# Patient Record
Sex: FEMALE | Race: WHITE | Employment: OTHER | URBAN - METROPOLITAN AREA
[De-identification: names, ages, dates, MRNs, and addresses within clinical notes are randomized per-mention and may not be internally consistent; named-entity substitution may affect disease eponyms.]

---

## 2019-09-17 ENCOUNTER — HOSPITAL ENCOUNTER (OUTPATIENT)
Dept: AUDIOLOGY | Age: 59
Discharge: HOME | End: 2019-09-17
Payer: MEDICAID

## 2019-09-17 PROCEDURE — 92567 TYMPANOMETRY: CPT | Performed by: AUDIOLOGIST

## 2019-09-17 PROCEDURE — 92557 COMPREHENSIVE HEARING TEST: CPT | Performed by: AUDIOLOGIST

## 2019-11-12 ENCOUNTER — FOLLOWUP TELEPHONE ENCOUNTER (OUTPATIENT)
Dept: AUDIOLOGY | Age: 59
End: 2019-11-12

## 2019-12-05 ENCOUNTER — HOSPITAL ENCOUNTER (OUTPATIENT)
Dept: AUDIOLOGY | Age: 59
Discharge: HOME | End: 2019-12-05
Payer: MEDICAID

## 2019-12-05 PROCEDURE — 9990000010 HC NO CHARGE VISIT: Performed by: AUDIOLOGIST

## 2020-01-10 ENCOUNTER — HOSPITAL ENCOUNTER (OUTPATIENT)
Dept: AUDIOLOGY | Age: 60
Discharge: HOME | End: 2020-01-10
Payer: MEDICAID

## 2020-01-10 PROCEDURE — 9990000010 HC NO CHARGE VISIT: Performed by: AUDIOLOGIST

## 2020-01-10 NOTE — PROGRESS NOTES
Hearing aid follow up    [facility] with custom mold. States wire hurts top of ear and mold hurts inside. Made left ear impression to order [employer] ITE/half shell. [facility] to [facility] code [facility] to ITE hearing aid. They stated need to send cover sheet and letter stating requested changes along with the previous approval to usual fax number for adjustment of approved Vcode. Returning [facility]BT for credit. Mold is non refundable.      Aisha Ogden M.A., [address] [facility] [id number]  Electronically signed by Joy Farrar on 1/10/2020 at 12:59 PM

## 2020-02-06 ENCOUNTER — HOSPITAL ENCOUNTER (OUTPATIENT)
Dept: AUDIOLOGY | Age: 60
Discharge: HOME | End: 2020-02-06
Payer: MEDICAID

## 2020-02-06 PROCEDURE — 9990000010 HC NO CHARGE VISIT: Performed by: AUDIOLOGIST

## 2020-03-06 ENCOUNTER — HOSPITAL ENCOUNTER (OUTPATIENT)
Dept: AUDIOLOGY | Age: 60
Discharge: HOME | End: 2020-03-06

## 2020-03-17 ENCOUNTER — HOSPITAL ENCOUNTER (OUTPATIENT)
Dept: AUDIOLOGY | Age: 60
Discharge: HOME | End: 2020-03-17
Payer: MEDICAID

## 2020-03-17 PROCEDURE — V5256 HEARING AID, DIGIT, MON, ITE: HCPCS | Performed by: AUDIOLOGIST

## 2020-03-17 PROCEDURE — V5241 DISPENSING FEE, MONAURAL: HCPCS | Performed by: AUDIOLOGIST

## 2020-03-17 NOTE — PROGRESS NOTES
Patient called due to missed appointment and hearing aid billing. She is doing well with the hearing aids. Billing to be done over the phone as patient does not want to come in due to COVID-19. She will call in as needed.     Electronically signed by Deirdre Monsivais on 3/17/2020 at 4:03 PM

## 2020-05-18 ENCOUNTER — FOLLOWUP TELEPHONE ENCOUNTER (OUTPATIENT)
Dept: AUDIOLOGY | Age: 60
End: 2020-05-18

## 2020-05-19 ENCOUNTER — FOLLOWUP TELEPHONE ENCOUNTER (OUTPATIENT)
Dept: AUDIOLOGY | Age: 60
End: 2020-05-19

## 2024-03-01 ENCOUNTER — HOSPITAL ENCOUNTER (OUTPATIENT)
Age: 64
Discharge: HOME | End: 2024-03-03

## 2024-03-01 PROCEDURE — 88305 TISSUE EXAM BY PATHOLOGIST: CPT

## 2024-03-06 LAB — SURGICAL PATHOLOGY REPORT: NORMAL

## 2024-07-01 ENCOUNTER — HOSPITAL ENCOUNTER (INPATIENT)
Age: 64
LOS: 14 days | Discharge: OTHER INSTITUTION | DRG: 233 | End: 2024-07-19
Attending: STUDENT IN AN ORGANIZED HEALTH CARE EDUCATION/TRAINING PROGRAM | Admitting: STUDENT IN AN ORGANIZED HEALTH CARE EDUCATION/TRAINING PROGRAM
Payer: COMMERCIAL

## 2024-07-01 ENCOUNTER — APPOINTMENT (OUTPATIENT)
Dept: GENERAL RADIOLOGY | Age: 64
DRG: 233 | End: 2024-07-01
Payer: COMMERCIAL

## 2024-07-01 DIAGNOSIS — I25.119 CORONARY ARTERY DISEASE INVOLVING NATIVE CORONARY ARTERY OF NATIVE HEART WITH ANGINA PECTORIS (HCC): ICD-10-CM

## 2024-07-01 DIAGNOSIS — R07.2 PRECORDIAL PAIN: Primary | ICD-10-CM

## 2024-07-01 DIAGNOSIS — R07.9 CHEST PAIN: ICD-10-CM

## 2024-07-01 DIAGNOSIS — R07.9 CHEST PAIN, UNSPECIFIED TYPE: ICD-10-CM

## 2024-07-01 DIAGNOSIS — G89.18 ACUTE POST-OPERATIVE PAIN: ICD-10-CM

## 2024-07-01 DIAGNOSIS — I25.10 CAD IN NATIVE ARTERY: ICD-10-CM

## 2024-07-01 LAB
ANION GAP SERPL CALCULATED.3IONS-SCNC: 14 MMOL/L (ref 7–16)
BASOPHILS # BLD: 0 K/UL (ref 0–0.2)
BASOPHILS NFR BLD: 0 % (ref 0–2)
BNP SERPL-MCNC: 167 PG/ML (ref 0–125)
BUN SERPL-MCNC: 17 MG/DL (ref 6–23)
CALCIUM SERPL-MCNC: 10.1 MG/DL (ref 8.6–10.2)
CHLORIDE SERPL-SCNC: 101 MMOL/L (ref 98–107)
CO2 SERPL-SCNC: 23 MMOL/L (ref 22–29)
CREAT SERPL-MCNC: 0.7 MG/DL (ref 0.5–1)
EOSINOPHIL # BLD: 0.08 K/UL (ref 0.05–0.5)
EOSINOPHILS RELATIVE PERCENT: 1 % (ref 0–6)
ERYTHROCYTE [DISTWIDTH] IN BLOOD BY AUTOMATED COUNT: 11.9 % (ref 11.5–15)
GFR, ESTIMATED: 90 ML/MIN/1.73M2
GLUCOSE SERPL-MCNC: 177 MG/DL (ref 74–99)
HCT VFR BLD AUTO: 42.6 % (ref 34–48)
HGB BLD-MCNC: 14.9 G/DL (ref 11.5–15.5)
LYMPHOCYTES NFR BLD: 1.13 K/UL (ref 1.5–4)
LYMPHOCYTES RELATIVE PERCENT: 12 % (ref 20–42)
MCH RBC QN AUTO: 41.2 PG (ref 26–35)
MCHC RBC AUTO-ENTMCNC: 35 G/DL (ref 32–34.5)
MCV RBC AUTO: 117.7 FL (ref 80–99.9)
MONOCYTES NFR BLD: 0.73 K/UL (ref 0.1–0.95)
MONOCYTES NFR BLD: 8 % (ref 2–12)
NEUTROPHILS NFR BLD: 79 % (ref 43–80)
NEUTS SEG NFR BLD: 7.36 K/UL (ref 1.8–7.3)
NUCLEATED RED BLOOD CELLS: 1 PER 100 WBC
PLATELET # BLD AUTO: 563 K/UL (ref 130–450)
PMV BLD AUTO: 9.9 FL (ref 7–12)
POTASSIUM SERPL-SCNC: 4.8 MMOL/L (ref 3.5–5)
RBC # BLD AUTO: 3.62 M/UL (ref 3.5–5.5)
RBC # BLD: ABNORMAL 10*6/UL
SODIUM SERPL-SCNC: 138 MMOL/L (ref 132–146)
TROPONIN I SERPL HS-MCNC: 15 NG/L (ref 0–9)
TROPONIN I SERPL HS-MCNC: 20 NG/L (ref 0–9)
WBC OTHER # BLD: 9.3 K/UL (ref 4.5–11.5)

## 2024-07-01 PROCEDURE — 71045 X-RAY EXAM CHEST 1 VIEW: CPT

## 2024-07-01 PROCEDURE — 80048 BASIC METABOLIC PNL TOTAL CA: CPT

## 2024-07-01 PROCEDURE — 93005 ELECTROCARDIOGRAM TRACING: CPT

## 2024-07-01 PROCEDURE — 85025 COMPLETE CBC W/AUTO DIFF WBC: CPT

## 2024-07-01 PROCEDURE — 83880 ASSAY OF NATRIURETIC PEPTIDE: CPT

## 2024-07-01 PROCEDURE — 84484 ASSAY OF TROPONIN QUANT: CPT

## 2024-07-01 PROCEDURE — 99285 EMERGENCY DEPT VISIT HI MDM: CPT

## 2024-07-01 ASSESSMENT — PAIN - FUNCTIONAL ASSESSMENT: PAIN_FUNCTIONAL_ASSESSMENT: NONE - DENIES PAIN

## 2024-07-02 ENCOUNTER — APPOINTMENT (OUTPATIENT)
Age: 64
DRG: 233 | End: 2024-07-02
Attending: INTERNAL MEDICINE
Payer: COMMERCIAL

## 2024-07-02 ENCOUNTER — APPOINTMENT (OUTPATIENT)
Dept: NUCLEAR MEDICINE | Age: 64
DRG: 233 | End: 2024-07-02
Attending: INTERNAL MEDICINE
Payer: COMMERCIAL

## 2024-07-02 PROBLEM — E66.8 MODERATE OBESITY: Status: ACTIVE | Noted: 2024-07-02

## 2024-07-02 PROBLEM — E66.9 MODERATE OBESITY: Status: ACTIVE | Noted: 2024-07-02

## 2024-07-02 PROBLEM — R07.9 CHEST PAIN: Status: ACTIVE | Noted: 2024-07-02

## 2024-07-02 PROBLEM — R79.89 ELEVATED TROPONIN: Status: ACTIVE | Noted: 2024-07-02

## 2024-07-02 PROBLEM — E78.00 PURE HYPERCHOLESTEROLEMIA: Status: ACTIVE | Noted: 2024-07-02

## 2024-07-02 LAB
ECHO BSA: 2.1 M2
EKG ATRIAL RATE: 66 BPM
EKG ATRIAL RATE: 78 BPM
EKG P AXIS: 37 DEGREES
EKG P AXIS: 52 DEGREES
EKG P-R INTERVAL: 146 MS
EKG P-R INTERVAL: 146 MS
EKG Q-T INTERVAL: 376 MS
EKG Q-T INTERVAL: 422 MS
EKG QRS DURATION: 78 MS
EKG QRS DURATION: 82 MS
EKG QTC CALCULATION (BAZETT): 428 MS
EKG QTC CALCULATION (BAZETT): 442 MS
EKG R AXIS: 32 DEGREES
EKG R AXIS: 5 DEGREES
EKG T AXIS: 2 DEGREES
EKG T AXIS: 32 DEGREES
EKG VENTRICULAR RATE: 66 BPM
EKG VENTRICULAR RATE: 78 BPM
GLUCOSE BLD-MCNC: 237 MG/DL (ref 74–99)
STRESS BASELINE DIAS BP: 80 MMHG
STRESS BASELINE HR: 69 BPM
STRESS BASELINE SYS BP: 160 MMHG
STRESS ESTIMATED WORKLOAD: 1 METS
STRESS PEAK DIAS BP: 80 MMHG
STRESS PEAK SYS BP: 160 MMHG
STRESS PERCENT HR ACHIEVED: 59 %
STRESS POST PEAK HR: 92 BPM
STRESS RATE PRESSURE PRODUCT: NORMAL BPM*MMHG
STRESS TARGET HR: 155 BPM
TROPONIN I SERPL HS-MCNC: 35 NG/L (ref 0–9)

## 2024-07-02 PROCEDURE — 93016 CV STRESS TEST SUPVJ ONLY: CPT | Performed by: INTERNAL MEDICINE

## 2024-07-02 PROCEDURE — 6370000000 HC RX 637 (ALT 250 FOR IP): Performed by: INTERNAL MEDICINE

## 2024-07-02 PROCEDURE — G0378 HOSPITAL OBSERVATION PER HR: HCPCS

## 2024-07-02 PROCEDURE — 3430000000 HC RX DIAGNOSTIC RADIOPHARMACEUTICAL: Performed by: RADIOLOGY

## 2024-07-02 PROCEDURE — 6360000002 HC RX W HCPCS: Performed by: INTERNAL MEDICINE

## 2024-07-02 PROCEDURE — 99223 1ST HOSP IP/OBS HIGH 75: CPT | Performed by: INTERNAL MEDICINE

## 2024-07-02 PROCEDURE — 78452 HT MUSCLE IMAGE SPECT MULT: CPT | Performed by: INTERNAL MEDICINE

## 2024-07-02 PROCEDURE — A9500 TC99M SESTAMIBI: HCPCS | Performed by: RADIOLOGY

## 2024-07-02 PROCEDURE — 82962 GLUCOSE BLOOD TEST: CPT

## 2024-07-02 PROCEDURE — 93018 CV STRESS TEST I&R ONLY: CPT | Performed by: INTERNAL MEDICINE

## 2024-07-02 PROCEDURE — 36415 COLL VENOUS BLD VENIPUNCTURE: CPT

## 2024-07-02 PROCEDURE — 93010 ELECTROCARDIOGRAM REPORT: CPT | Performed by: INTERNAL MEDICINE

## 2024-07-02 PROCEDURE — 96376 TX/PRO/DX INJ SAME DRUG ADON: CPT

## 2024-07-02 PROCEDURE — 84484 ASSAY OF TROPONIN QUANT: CPT

## 2024-07-02 PROCEDURE — 93017 CV STRESS TEST TRACING ONLY: CPT

## 2024-07-02 PROCEDURE — 93005 ELECTROCARDIOGRAM TRACING: CPT | Performed by: INTERNAL MEDICINE

## 2024-07-02 PROCEDURE — 96375 TX/PRO/DX INJ NEW DRUG ADDON: CPT

## 2024-07-02 PROCEDURE — 78452 HT MUSCLE IMAGE SPECT MULT: CPT

## 2024-07-02 RX ORDER — PRAVASTATIN SODIUM 20 MG
40 TABLET ORAL DAILY
Status: DISCONTINUED | OUTPATIENT
Start: 2024-07-02 | End: 2024-07-09

## 2024-07-02 RX ORDER — PANTOPRAZOLE SODIUM 40 MG/1
40 TABLET, DELAYED RELEASE ORAL
Status: DISCONTINUED | OUTPATIENT
Start: 2024-07-02 | End: 2024-07-09

## 2024-07-02 RX ORDER — MORPHINE SULFATE 2 MG/ML
1 INJECTION, SOLUTION INTRAMUSCULAR; INTRAVENOUS EVERY 4 HOURS PRN
Status: DISCONTINUED | OUTPATIENT
Start: 2024-07-02 | End: 2024-07-09

## 2024-07-02 RX ORDER — SODIUM CHLORIDE 9 MG/ML
INJECTION, SOLUTION INTRAVENOUS CONTINUOUS
Status: ACTIVE | OUTPATIENT
Start: 2024-07-03 | End: 2024-07-03

## 2024-07-02 RX ORDER — TETRAKIS(2-METHOXYISOBUTYLISOCYANIDE)COPPER(I) TETRAFLUOROBORATE 1 MG/ML
12.9 INJECTION, POWDER, LYOPHILIZED, FOR SOLUTION INTRAVENOUS
Status: COMPLETED | OUTPATIENT
Start: 2024-07-02 | End: 2024-07-02

## 2024-07-02 RX ORDER — DIPHENHYDRAMINE HCL 25 MG
50 TABLET ORAL ONCE
Status: COMPLETED | OUTPATIENT
Start: 2024-07-02 | End: 2024-07-02

## 2024-07-02 RX ORDER — DIPHENHYDRAMINE HCL 25 MG
25 TABLET ORAL EVERY 6 HOURS PRN
Status: DISCONTINUED | OUTPATIENT
Start: 2024-07-03 | End: 2024-07-05

## 2024-07-02 RX ORDER — PREDNISONE 20 MG/1
40 TABLET ORAL ONCE
Status: COMPLETED | OUTPATIENT
Start: 2024-07-02 | End: 2024-07-02

## 2024-07-02 RX ORDER — TETRAKIS(2-METHOXYISOBUTYLISOCYANIDE)COPPER(I) TETRAFLUOROBORATE 1 MG/ML
30 INJECTION, POWDER, LYOPHILIZED, FOR SOLUTION INTRAVENOUS
Status: COMPLETED | OUTPATIENT
Start: 2024-07-02 | End: 2024-07-02

## 2024-07-02 RX ORDER — REGADENOSON 0.08 MG/ML
0.4 INJECTION, SOLUTION INTRAVENOUS
Status: COMPLETED | OUTPATIENT
Start: 2024-07-02 | End: 2024-07-02

## 2024-07-02 RX ORDER — AMLODIPINE BESYLATE 10 MG/1
10 TABLET ORAL DAILY
Status: DISCONTINUED | OUTPATIENT
Start: 2024-07-02 | End: 2024-07-09

## 2024-07-02 RX ORDER — HYDROXYUREA 500 MG/1
500 CAPSULE ORAL 2 TIMES DAILY
Status: DISCONTINUED | OUTPATIENT
Start: 2024-07-02 | End: 2024-07-03

## 2024-07-02 RX ADMIN — Medication 12.9 MILLICURIE: at 08:24

## 2024-07-02 RX ADMIN — METOPROLOL TARTRATE 50 MG: 50 TABLET, FILM COATED ORAL at 11:40

## 2024-07-02 RX ADMIN — HYDROXYUREA 500 MG: 500 CAPSULE ORAL at 21:00

## 2024-07-02 RX ADMIN — METFORMIN HYDROCHLORIDE 500 MG: 500 TABLET ORAL at 19:01

## 2024-07-02 RX ADMIN — MORPHINE SULFATE 1 MG: 2 INJECTION, SOLUTION INTRAMUSCULAR; INTRAVENOUS at 06:07

## 2024-07-02 RX ADMIN — PANTOPRAZOLE SODIUM 40 MG: 40 TABLET, DELAYED RELEASE ORAL at 06:07

## 2024-07-02 RX ADMIN — AMLODIPINE BESYLATE 10 MG: 10 TABLET ORAL at 11:39

## 2024-07-02 RX ADMIN — MORPHINE SULFATE 1 MG: 2 INJECTION, SOLUTION INTRAMUSCULAR; INTRAVENOUS at 21:34

## 2024-07-02 RX ADMIN — REGADENOSON 0.4 MG: 0.08 INJECTION, SOLUTION INTRAVENOUS at 09:38

## 2024-07-02 RX ADMIN — PRAVASTATIN SODIUM 40 MG: 20 TABLET ORAL at 11:43

## 2024-07-02 RX ADMIN — METOPROLOL TARTRATE 50 MG: 50 TABLET, FILM COATED ORAL at 20:26

## 2024-07-02 RX ADMIN — HYDROXYUREA 500 MG: 500 CAPSULE ORAL at 11:43

## 2024-07-02 RX ADMIN — Medication 30 MILLICURIE: at 11:06

## 2024-07-02 RX ADMIN — PREDNISONE 40 MG: 20 TABLET ORAL at 20:25

## 2024-07-02 RX ADMIN — METFORMIN HYDROCHLORIDE 500 MG: 500 TABLET ORAL at 11:39

## 2024-07-02 RX ADMIN — DIPHENHYDRAMINE HYDROCHLORIDE 50 MG: 25 TABLET ORAL at 20:25

## 2024-07-02 ASSESSMENT — PAIN SCALES - GENERAL
PAINLEVEL_OUTOF10: 6
PAINLEVEL_OUTOF10: 5

## 2024-07-02 ASSESSMENT — PAIN DESCRIPTION - LOCATION: LOCATION: CHEST

## 2024-07-02 NOTE — PLAN OF CARE
Problem: Chronic Conditions and Co-morbidities  Goal: Patient's chronic conditions and co-morbidity symptoms are monitored and maintained or improved  Outcome: Progressing     Problem: Safety - Adult  Goal: Free from fall injury  Outcome: Progressing     Problem: ABCDS Injury Assessment  Goal: Absence of physical injury  Outcome: Progressing

## 2024-07-02 NOTE — PROGRESS NOTES
4 Eyes Skin Assessment     NAME:  Sherie Guerrero  YOB: 1960  MEDICAL RECORD NUMBER:  [id number]    The patient is being assessed for  Admission    I agree that at least one RN has performed a thorough Head to Toe Skin Assessment on the patient. ALL assessment sites listed below have been assessed.      Areas assessed by both nurses:    Head, Face, Ears, Shoulders, Back, Chest, Arms, Elbows, Hands, Sacrum. Buttock, Coccyx, Ischium, and Legs. Feet and Heels        Does the Patient have a Wound? No noted wound(s)       Joe Prevention initiated by RN: Yes  Wound Care Orders initiated by RN: No    Pressure Injury (Stage 3,4, Unstageable, DTI, NWPT, and Complex wounds) if present, place Wound referral order by RN under : No    New Ostomies, if present place, Ostomy referral order under : No     Nurse 1 eSignature: Electronically signed by Tc Berg RN on 7/2/24 at 4:57 AM EDT    **SHARE this note so that the co-signing nurse can place an eSignature**    Nurse 2 eSignature: Electronically signed by Yareli Bush RN on 7/2/24 at 4:58 AM EDT

## 2024-07-02 NOTE — PROGRESS NOTES
Perfusion imaging reviewed and demonstrates evidence of a reversible inferior perfusion abnormality consistent with ischemia of the right coronary distribution with an additional component of attenuation with associated postischemic stunning suggesting an intermediate risk of ischemic events.  On the basis of perfusion findings and the patient's clinical presentation including that of her high-sensitivity troponin levels, coronary angiography will be recommended and further discussed including potential issues related to contrast allergy.    SCAI AUC(7), indication 16; score 7

## 2024-07-02 NOTE — ED PROVIDER NOTES
Department of Emergency Medicine     Written by: Soco Leblanc MD  Patient Name: Sherie Guerrero  Admit Date: 2024  7:56 PM  MRN: [id number]                   : 1960    HPI     Chief Complaint   Patient presents with    Chest Pain     Patient c/o midsternal chest pain intermittently throughout the day. Denies SOB or dizziness.       Sherie Guerrero is a 63 y.o. female that presents to the ED with midsternal chest pressure.  Ongoing for the last few days.  The patient says that she has no provocative relieving factors.  She has no shortness of breath with her symptoms.  No fevers chills or diaphoresis.  She is never seen a cardiologist, she has not had a stress test or echocardiogram.  She is compliant with her medications.  She does not use anticoagulants or antiplatelet therapies.  She does state that she injured her left hand in the past, she is having tingling in her left hand.  No recent illness diarrhea constipation    Review of systems   Pertinent positives and negatives mentioned in the HPI/MDM.      Physical   Physical Exam  Constitutional:       General: She is not in acute distress.     Appearance: Normal appearance.   Eyes:      Extraocular Movements: Extraocular movements intact.      Pupils: Pupils are equal, round, and reactive to light.   Cardiovascular:      Rate and Rhythm: Normal rate and regular rhythm.      Pulses: Normal pulses.      Heart sounds: Normal heart sounds.   Pulmonary:      Effort: Pulmonary effort is normal. No respiratory distress.   Abdominal:      Palpations: Abdomen is soft.      Tenderness: There is no abdominal tenderness.   Musculoskeletal:         General: No swelling or tenderness. Normal range of motion.   Skin:     General: Skin is warm and dry.      Coloration: Skin is not jaundiced or pale.   Neurological:      Mental Status: She is alert and oriented to person, place, and time. Mental status is at baseline.        Chart review: The patient was  allergic to aspirin and did not want to take it.  Discussed the case with internal medicine who agreed to admit this patient to their excellent service, possibly for coronary artery catheterization.    Clinical Impression  1. Precordial pain    2. Chest pain, unspecified type         Disposition  Patient's disposition: Admit to telemetry    Soco Leblanc MD  Resident PGY-2

## 2024-07-02 NOTE — CARE COORDINATION
7/2/24, Patient admitted for chest pain.  SW went to meet with patient in room-who is currently off floor for stress test.  Should stress test be negative patient could possibly discharge to home.  Patient presents from home.  PCP is Dr. Avery and Pharmacy is [facility] in [place].  SW will follow for any home going needs.      Olimpia Duncan DIONICIO  [facility] Case Management  [phone]

## 2024-07-02 NOTE — H&P
[facility]  History and Physical      CHIEF COMPLAINT: Chest pain    Reason for Admission: Chest pain, elevated troponin    History Obtained From: Patient    PCP :  Ovidio Avery MD    [address] / [facility] [address]      HISTORY OF PRESENT ILLNESS:      The patient is a 63 y.o. female presents to the emergency room because of chest pain.  Pain is located in the left anterior precordium with radiation into the jaw and left arm.  She has underlying history of hypertension, essential thrombocytosis.  Her troponin was higher than baseline.  She was then admitted.  She has been having intermittent chest pain.  She needed morphine during the night for chest pain control.  Patient was seen in the stress suite earlier today prior to stress test.    Past Medical History:        Diagnosis Date    Diabetes (HCC)     Hernia     Hypertension     Obesity     PCOS (polycystic ovarian syndrome)      Past Surgical History:        Procedure Laterality Date    CHOLECYSTECTOMY      CYST REMOVAL      ON NECK    FOOT SURGERY           Medications Prior to Admission:    Medications Prior to Admission: hydroxyurea (HYDREA) 500 MG chemo capsule, take 1 capsule by mouth twice a day  sertraline (ZOLOFT) 50 MG tablet, take 1 tablet by mouth once daily (Patient not taking: Reported on 7/2/2024)  ALPRAZolam (XANAX) 2 MG tablet, Take 1 tablet by mouth 2 times daily as needed for Anxiety for up to 30 days.  metFORMIN (GLUCOPHAGE) 500 MG tablet, take 1 tablet by mouth twice a day with meals  amLODIPine (NORVASC) 10 MG tablet, take 1 tablet by mouth once daily  pravastatin (PRAVACHOL) 40 MG tablet, Take 1 tablet by mouth daily  triamcinolone (KENALOG) 0.1 % cream, Apply topically to affected areas 2 times daily.  omeprazole (PRILOSEC) 40 MG delayed release capsule, Take 1 capsule by mouth daily  metoprolol tartrate (LOPRESSOR) 50 MG tablet, Take 1 tablet by mouth 2 times daily  EPINEPHrine (EPIPEN    Result Value Ref Range    Body Surface Area 2.1 m2    Stress Target  bpm    Baseline Systolic  mmHg    Baseline Diastolic BP 80 mmHg    Stress Systolic  mmHg    Stress Diastolic BP 80 mmHg    Baseline HR 69 BPM    Stress Peak HR 92 BPM    Stress Estimated Workload 1.0 METS    Stress Rate Pressure Product 14,720 BPM*mmHg    Stress Percent HR Achieved 59 %   EKG 12 Lead    Collection Time: 07/02/24 12:02 PM   Result Value Ref Range    Ventricular Rate 66 BPM    Atrial Rate 66 BPM    P-R Interval 146 ms    QRS Duration 78 ms    Q-T Interval 422 ms    QTc Calculation (Bazett) 442 ms    P Axis 37 degrees    R Axis 5 degrees    T Axis 2 degrees       XR CHEST PORTABLE   Final Result   No acute cardiopulmonary disease.                 ASSESSMENT :      Principal Problem:    Chest pain  Active Problems:    Primary hypertension    Type 2 diabetes mellitus without complication, without long-term current use of insulin (HCC)    Elevated troponin    Pure hypercholesterolemia    Moderate obesity  Resolved Problems:    * No resolved hospital problems. *      Plan :  *This is abnormal  Cardiology following  Possible heart cath  Electronically signed by Art Blue MD on 7/2/2024 at 4:07 PM    NOTE: This report was transcribed using voice recognition software. Every effort was made to ensure accuracy; however, inadvertent transcription errors may be present

## 2024-07-02 NOTE — CONSULTS
188 09/24/2018     Lab Results   Component Value Date    HDL 51 08/05/2019    HDL 25 (A) 04/01/2019    HDL 35 09/24/2018     No components found for: \"LDLCALC\"      Cardiac Tests:  ECG: Initial resting electrocardiogram reviewed time of evaluation demonstrates evidence of sinus rhythm with occasional supraventricular ectopy and nonspecific ST changes and with the exception of ectopy no significant change from prior tracings  Telemetry findings reviewed: sinus rhythm with occasional supraventricular ectopy, no new tachy/bradyarrhythmias overnight  Chest X-ray: A chest x-ray reviewed at time of evaluation demonstrates no evidence of cardiomegaly or infiltrate      ASSESSMENT / PLAN: On a clinical basis, the patient presents with symptoms of precordial chest discomfort of a random nature and nonexertional with progressive frequency and descriptors not in compatible with that of myocardial ischemia in the absence of significant electrocardiographic abnormalities on her initial tracing and with equivocal high-sensitivity troponin levels.  At time of evaluation this is been extensively discussed on the basis of her symptomatology and risk profile, both a diagnostic and more importantly prognostic basis, a gated vasodilator myocardial perfusion imaging study will be obtained with additional recommendations as appropriate following completion and review.  Independent of findings, appropriate lifestyle modification to achieve weight reduction will benefit diastolic cardiac performance as well as reducing risk of the development of obstructive sleep apnea with associated adverse cardiovascular effects.  Ongoing aggressive risk factor modification of blood pressure, diabetes and serum lipids will remain essential to reducing risk of future atherosclerotic development.  Additional noncardiovascular factors will be deferred to primary care.    Thank you for allowing me to participate in your patient's care. Please feel free to

## 2024-07-03 ENCOUNTER — APPOINTMENT (OUTPATIENT)
Dept: CT IMAGING | Age: 64
DRG: 233 | End: 2024-07-03
Payer: COMMERCIAL

## 2024-07-03 PROBLEM — I25.10 CAD (CORONARY ARTERY DISEASE): Status: ACTIVE | Noted: 2024-07-03

## 2024-07-03 PROBLEM — I25.10 CAD IN NATIVE ARTERY: Status: ACTIVE | Noted: 2024-07-01

## 2024-07-03 LAB
ABO + RH BLD: NORMAL
ARM BAND NUMBER: NORMAL
BLOOD BANK SAMPLE EXPIRATION: NORMAL
BLOOD GROUP ANTIBODIES SERPL: NEGATIVE
ECHO BSA: 2.1 M2
ERYTHROCYTE [DISTWIDTH] IN BLOOD BY AUTOMATED COUNT: 11.9 % (ref 11.5–15)
HCT VFR BLD AUTO: 40.8 % (ref 34–48)
HGB BLD-MCNC: 14.6 G/DL (ref 11.5–15.5)
MCH RBC QN AUTO: 42.2 PG (ref 26–35)
MCHC RBC AUTO-ENTMCNC: 35.8 G/DL (ref 32–34.5)
MCV RBC AUTO: 117.9 FL (ref 80–99.9)
PARTIAL THROMBOPLASTIN TIME: 17.7 SEC (ref 24.5–35.1)
PARTIAL THROMBOPLASTIN TIME: 74.1 SEC (ref 24.5–35.1)
PLATELET # BLD AUTO: 663 K/UL (ref 130–450)
PMV BLD AUTO: 10.3 FL (ref 7–12)
RBC # BLD AUTO: 3.46 M/UL (ref 3.5–5.5)
WBC OTHER # BLD: 14.7 K/UL (ref 4.5–11.5)

## 2024-07-03 PROCEDURE — 6370000000 HC RX 637 (ALT 250 FOR IP): Performed by: NURSE PRACTITIONER

## 2024-07-03 PROCEDURE — 2580000003 HC RX 258: Performed by: INTERNAL MEDICINE

## 2024-07-03 PROCEDURE — G0378 HOSPITAL OBSERVATION PER HR: HCPCS

## 2024-07-03 PROCEDURE — 2580000003 HC RX 258: Performed by: NURSE PRACTITIONER

## 2024-07-03 PROCEDURE — 6370000000 HC RX 637 (ALT 250 FOR IP): Performed by: INTERNAL MEDICINE

## 2024-07-03 PROCEDURE — 96366 THER/PROPH/DIAG IV INF ADDON: CPT

## 2024-07-03 PROCEDURE — 96365 THER/PROPH/DIAG IV INF INIT: CPT

## 2024-07-03 PROCEDURE — 6360000004 HC RX CONTRAST MEDICATION: Performed by: INTERNAL MEDICINE

## 2024-07-03 PROCEDURE — 6360000002 HC RX W HCPCS: Performed by: INTERNAL MEDICINE

## 2024-07-03 PROCEDURE — 93458 L HRT ARTERY/VENTRICLE ANGIO: CPT | Performed by: INTERNAL MEDICINE

## 2024-07-03 PROCEDURE — 4A023N7 MEASUREMENT OF CARDIAC SAMPLING AND PRESSURE, LEFT HEART, PERCUTANEOUS APPROACH: ICD-10-PCS | Performed by: INTERNAL MEDICINE

## 2024-07-03 PROCEDURE — 96368 THER/DIAG CONCURRENT INF: CPT

## 2024-07-03 PROCEDURE — 2500000003 HC RX 250 WO HCPCS: Performed by: INTERNAL MEDICINE

## 2024-07-03 PROCEDURE — B2151ZZ FLUOROSCOPY OF LEFT HEART USING LOW OSMOLAR CONTRAST: ICD-10-PCS | Performed by: INTERNAL MEDICINE

## 2024-07-03 PROCEDURE — 87081 CULTURE SCREEN ONLY: CPT

## 2024-07-03 PROCEDURE — C1887 CATHETER, GUIDING: HCPCS | Performed by: INTERNAL MEDICINE

## 2024-07-03 PROCEDURE — 96376 TX/PRO/DX INJ SAME DRUG ADON: CPT

## 2024-07-03 PROCEDURE — 99222 1ST HOSP IP/OBS MODERATE 55: CPT | Performed by: STUDENT IN AN ORGANIZED HEALTH CARE EDUCATION/TRAINING PROGRAM

## 2024-07-03 PROCEDURE — 85027 COMPLETE CBC AUTOMATED: CPT

## 2024-07-03 PROCEDURE — 2000000000 HC ICU R&B

## 2024-07-03 PROCEDURE — 93571 IV DOP VEL&/PRESS C FLO 1ST: CPT | Performed by: INTERNAL MEDICINE

## 2024-07-03 PROCEDURE — 2709999900 HC NON-CHARGEABLE SUPPLY: Performed by: INTERNAL MEDICINE

## 2024-07-03 PROCEDURE — B2111ZZ FLUOROSCOPY OF MULTIPLE CORONARY ARTERIES USING LOW OSMOLAR CONTRAST: ICD-10-PCS | Performed by: INTERNAL MEDICINE

## 2024-07-03 PROCEDURE — 99233 SBSQ HOSP IP/OBS HIGH 50: CPT | Performed by: INTERNAL MEDICINE

## 2024-07-03 PROCEDURE — 93799 UNLISTED CV SVC/PROCEDURE: CPT | Performed by: INTERNAL MEDICINE

## 2024-07-03 PROCEDURE — 86850 RBC ANTIBODY SCREEN: CPT

## 2024-07-03 PROCEDURE — 86901 BLOOD TYPING SEROLOGIC RH(D): CPT

## 2024-07-03 PROCEDURE — C1769 GUIDE WIRE: HCPCS | Performed by: INTERNAL MEDICINE

## 2024-07-03 PROCEDURE — 86900 BLOOD TYPING SEROLOGIC ABO: CPT

## 2024-07-03 PROCEDURE — C1894 INTRO/SHEATH, NON-LASER: HCPCS | Performed by: INTERNAL MEDICINE

## 2024-07-03 PROCEDURE — 71250 CT THORAX DX C-: CPT

## 2024-07-03 PROCEDURE — 85730 THROMBOPLASTIN TIME PARTIAL: CPT

## 2024-07-03 RX ORDER — SENNA AND DOCUSATE SODIUM 50; 8.6 MG/1; MG/1
2 TABLET, FILM COATED ORAL NIGHTLY
Status: DISCONTINUED | OUTPATIENT
Start: 2024-07-03 | End: 2024-07-09

## 2024-07-03 RX ORDER — NITROGLYCERIN 20 MG/100ML
5-200 INJECTION INTRAVENOUS CONTINUOUS
Status: DISCONTINUED | OUTPATIENT
Start: 2024-07-03 | End: 2024-07-09

## 2024-07-03 RX ORDER — ASPIRIN 81 MG/1
81 TABLET, CHEWABLE ORAL ONCE
Status: COMPLETED | OUTPATIENT
Start: 2024-07-03 | End: 2024-07-03

## 2024-07-03 RX ORDER — HEPARIN SODIUM 1000 [USP'U]/ML
2000 INJECTION, SOLUTION INTRAVENOUS; SUBCUTANEOUS PRN
Status: DISCONTINUED | OUTPATIENT
Start: 2024-07-03 | End: 2024-07-09

## 2024-07-03 RX ORDER — ALBUTEROL SULFATE 2.5 MG/3ML
2.5 SOLUTION RESPIRATORY (INHALATION)
Status: DISCONTINUED | OUTPATIENT
Start: 2024-07-03 | End: 2024-07-09

## 2024-07-03 RX ORDER — DIPHENHYDRAMINE HYDROCHLORIDE 50 MG/ML
25 INJECTION INTRAMUSCULAR; INTRAVENOUS EVERY 4 HOURS PRN
Status: DISCONTINUED | OUTPATIENT
Start: 2024-07-03 | End: 2024-07-05

## 2024-07-03 RX ORDER — ASPIRIN 81 MG/1
81 TABLET ORAL DAILY
Status: DISCONTINUED | OUTPATIENT
Start: 2024-07-04 | End: 2024-07-09

## 2024-07-03 RX ORDER — HEPARIN SODIUM 1000 [USP'U]/ML
4000 INJECTION, SOLUTION INTRAVENOUS; SUBCUTANEOUS ONCE
Status: COMPLETED | OUTPATIENT
Start: 2024-07-03 | End: 2024-07-03

## 2024-07-03 RX ORDER — MIDAZOLAM HYDROCHLORIDE 1 MG/ML
INJECTION INTRAMUSCULAR; INTRAVENOUS PRN
Status: DISCONTINUED | OUTPATIENT
Start: 2024-07-03 | End: 2024-07-03 | Stop reason: HOSPADM

## 2024-07-03 RX ORDER — FENTANYL CITRATE 50 UG/ML
INJECTION, SOLUTION INTRAMUSCULAR; INTRAVENOUS PRN
Status: DISCONTINUED | OUTPATIENT
Start: 2024-07-03 | End: 2024-07-03 | Stop reason: HOSPADM

## 2024-07-03 RX ORDER — VERAPAMIL HYDROCHLORIDE 2.5 MG/ML
INJECTION, SOLUTION INTRAVENOUS PRN
Status: DISCONTINUED | OUTPATIENT
Start: 2024-07-03 | End: 2024-07-03 | Stop reason: HOSPADM

## 2024-07-03 RX ORDER — ASPIRIN 81 MG/1
40.5 TABLET, CHEWABLE ORAL ONCE
Status: COMPLETED | OUTPATIENT
Start: 2024-07-03 | End: 2024-07-03

## 2024-07-03 RX ORDER — ASPIRIN 81 MG/1
324 TABLET, CHEWABLE ORAL ONCE
Status: COMPLETED | OUTPATIENT
Start: 2024-07-03 | End: 2024-07-03

## 2024-07-03 RX ORDER — HYDRALAZINE HYDROCHLORIDE 20 MG/ML
INJECTION INTRAMUSCULAR; INTRAVENOUS PRN
Status: DISCONTINUED | OUTPATIENT
Start: 2024-07-03 | End: 2024-07-03 | Stop reason: HOSPADM

## 2024-07-03 RX ORDER — SODIUM CHLORIDE 0.9 % (FLUSH) 0.9 %
5-40 SYRINGE (ML) INJECTION PRN
Status: DISCONTINUED | OUTPATIENT
Start: 2024-07-03 | End: 2024-07-09

## 2024-07-03 RX ORDER — HYDROXYUREA 500 MG/1
1000 CAPSULE ORAL 2 TIMES DAILY
Status: DISCONTINUED | OUTPATIENT
Start: 2024-07-03 | End: 2024-07-05

## 2024-07-03 RX ORDER — MONTELUKAST SODIUM 10 MG/1
10 TABLET ORAL
Status: DISPENSED | OUTPATIENT
Start: 2024-07-03 | End: 2024-07-04

## 2024-07-03 RX ORDER — SODIUM CHLORIDE 9 MG/ML
INJECTION, SOLUTION INTRAVENOUS CONTINUOUS
Status: ACTIVE | OUTPATIENT
Start: 2024-07-03 | End: 2024-07-03

## 2024-07-03 RX ORDER — HEPARIN SODIUM 10000 [USP'U]/ML
INJECTION, SOLUTION INTRAVENOUS; SUBCUTANEOUS PRN
Status: DISCONTINUED | OUTPATIENT
Start: 2024-07-03 | End: 2024-07-03 | Stop reason: HOSPADM

## 2024-07-03 RX ORDER — HEPARIN SODIUM 10000 [USP'U]/100ML
5-30 INJECTION, SOLUTION INTRAVENOUS CONTINUOUS
Status: DISCONTINUED | OUTPATIENT
Start: 2024-07-03 | End: 2024-07-09

## 2024-07-03 RX ORDER — HEPARIN SODIUM 1000 [USP'U]/ML
4000 INJECTION, SOLUTION INTRAVENOUS; SUBCUTANEOUS PRN
Status: DISCONTINUED | OUTPATIENT
Start: 2024-07-03 | End: 2024-07-09

## 2024-07-03 RX ORDER — ACETAMINOPHEN 325 MG/1
650 TABLET ORAL EVERY 4 HOURS PRN
Status: DISCONTINUED | OUTPATIENT
Start: 2024-07-03 | End: 2024-07-09

## 2024-07-03 RX ORDER — ASPIRIN 81 MG/1
162 TABLET, CHEWABLE ORAL ONCE
Status: COMPLETED | OUTPATIENT
Start: 2024-07-03 | End: 2024-07-03

## 2024-07-03 RX ORDER — SODIUM CHLORIDE 0.9 % (FLUSH) 0.9 %
5-40 SYRINGE (ML) INJECTION EVERY 12 HOURS SCHEDULED
Status: DISCONTINUED | OUTPATIENT
Start: 2024-07-03 | End: 2024-07-09

## 2024-07-03 RX ORDER — EPINEPHRINE 1 MG/ML(1)
0.3 AMPUL (ML) INJECTION
Status: DISCONTINUED | OUTPATIENT
Start: 2024-07-03 | End: 2024-07-05

## 2024-07-03 RX ORDER — NITROGLYCERIN 20 MG/100ML
INJECTION INTRAVENOUS CONTINUOUS PRN
Status: COMPLETED | OUTPATIENT
Start: 2024-07-03 | End: 2024-07-03

## 2024-07-03 RX ORDER — SODIUM CHLORIDE 9 MG/ML
INJECTION, SOLUTION INTRAVENOUS PRN
Status: DISCONTINUED | OUTPATIENT
Start: 2024-07-03 | End: 2024-07-09

## 2024-07-03 RX ADMIN — METOPROLOL TARTRATE 50 MG: 50 TABLET, FILM COATED ORAL at 22:05

## 2024-07-03 RX ADMIN — HEPARIN SODIUM 10 UNITS/KG/HR: 10000 INJECTION, SOLUTION INTRAVENOUS at 15:31

## 2024-07-03 RX ADMIN — ASPIRIN 81 MG 0.3 MG: 81 TABLET ORAL at 21:30

## 2024-07-03 RX ADMIN — NITROGLYCERIN 5 MCG/MIN: 20 INJECTION INTRAVENOUS at 20:29

## 2024-07-03 RX ADMIN — MORPHINE SULFATE 1 MG: 2 INJECTION, SOLUTION INTRAMUSCULAR; INTRAVENOUS at 18:29

## 2024-07-03 RX ADMIN — SODIUM CHLORIDE: 9 INJECTION, SOLUTION INTRAVENOUS at 11:18

## 2024-07-03 RX ADMIN — PRAVASTATIN SODIUM 40 MG: 20 TABLET ORAL at 10:22

## 2024-07-03 RX ADMIN — MORPHINE SULFATE 1 MG: 2 INJECTION, SOLUTION INTRAMUSCULAR; INTRAVENOUS at 10:15

## 2024-07-03 RX ADMIN — ASPIRIN 81 MG 324 MG: 81 TABLET ORAL at 23:32

## 2024-07-03 RX ADMIN — METOPROLOL TARTRATE 50 MG: 50 TABLET, FILM COATED ORAL at 10:21

## 2024-07-03 RX ADMIN — ASPIRIN 81 MG 10 MG: 81 TABLET ORAL at 22:15

## 2024-07-03 RX ADMIN — HYDROXYUREA 500 MG: 500 CAPSULE ORAL at 10:23

## 2024-07-03 RX ADMIN — HYDROCORTISONE SODIUM SUCCINATE 100 MG: 100 INJECTION, POWDER, FOR SOLUTION INTRAMUSCULAR; INTRAVENOUS at 06:45

## 2024-07-03 RX ADMIN — ASPIRIN 81 MG 3 MG: 81 TABLET ORAL at 22:00

## 2024-07-03 RX ADMIN — AMLODIPINE BESYLATE 10 MG: 10 TABLET ORAL at 10:20

## 2024-07-03 RX ADMIN — HYDROXYUREA 1000 MG: 500 CAPSULE ORAL at 22:05

## 2024-07-03 RX ADMIN — ASPIRIN 81 MG 81 MG: 81 TABLET ORAL at 23:02

## 2024-07-03 RX ADMIN — MORPHINE SULFATE 1 MG: 2 INJECTION, SOLUTION INTRAMUSCULAR; INTRAVENOUS at 14:28

## 2024-07-03 RX ADMIN — ASPIRIN 81 MG 1 MG: 81 TABLET ORAL at 21:45

## 2024-07-03 RX ADMIN — ASPIRIN 81 MG 162 MG: 81 TABLET ORAL at 23:15

## 2024-07-03 RX ADMIN — HEPARIN SODIUM 4000 UNITS: 1000 INJECTION INTRAVENOUS; SUBCUTANEOUS at 15:31

## 2024-07-03 RX ADMIN — SODIUM CHLORIDE: 9 INJECTION, SOLUTION INTRAVENOUS at 05:58

## 2024-07-03 RX ADMIN — MORPHINE SULFATE 1 MG: 2 INJECTION, SOLUTION INTRAMUSCULAR; INTRAVENOUS at 22:05

## 2024-07-03 RX ADMIN — SODIUM CHLORIDE, PRESERVATIVE FREE 10 ML: 5 INJECTION INTRAVENOUS at 10:16

## 2024-07-03 RX ADMIN — DIPHENHYDRAMINE HYDROCHLORIDE 25 MG: 25 TABLET ORAL at 06:45

## 2024-07-03 RX ADMIN — ASPIRIN 81 MG 40.5 MG: 81 TABLET ORAL at 22:47

## 2024-07-03 RX ADMIN — ASPIRIN 81 MG 0.1 MG: 81 TABLET ORAL at 21:18

## 2024-07-03 RX ADMIN — ASPIRIN 81 MG 20 MG: 81 TABLET ORAL at 22:30

## 2024-07-03 ASSESSMENT — PAIN DESCRIPTION - FREQUENCY
FREQUENCY: CONTINUOUS
FREQUENCY: CONTINUOUS
FREQUENCY: INTERMITTENT

## 2024-07-03 ASSESSMENT — PAIN SCALES - GENERAL
PAINLEVEL_OUTOF10: 6
PAINLEVEL_OUTOF10: 4
PAINLEVEL_OUTOF10: 0
PAINLEVEL_OUTOF10: 2
PAINLEVEL_OUTOF10: 5
PAINLEVEL_OUTOF10: 7

## 2024-07-03 ASSESSMENT — PAIN DESCRIPTION - ORIENTATION
ORIENTATION: MID

## 2024-07-03 ASSESSMENT — PAIN DESCRIPTION - LOCATION
LOCATION: CHEST;BACK
LOCATION: BACK;CHEST
LOCATION: CHEST
LOCATION: BACK;CHEST
LOCATION: CHEST

## 2024-07-03 ASSESSMENT — PAIN - FUNCTIONAL ASSESSMENT: PAIN_FUNCTIONAL_ASSESSMENT: ACTIVITIES ARE NOT PREVENTED

## 2024-07-03 ASSESSMENT — PAIN DESCRIPTION - DESCRIPTORS
DESCRIPTORS: HEAVINESS
DESCRIPTORS: ACHING;SORE;DISCOMFORT
DESCRIPTORS: HEAVINESS
DESCRIPTORS: ACHING;DISCOMFORT;SORE
DESCRIPTORS: ACHING;DISCOMFORT;SORE

## 2024-07-03 ASSESSMENT — PAIN DESCRIPTION - PAIN TYPE
TYPE: ACUTE PAIN
TYPE: ACUTE PAIN

## 2024-07-03 ASSESSMENT — PAIN DESCRIPTION - ONSET: ONSET: ON-GOING

## 2024-07-03 NOTE — CONSULTS
[facility]  Hematology/Oncology  Consult      Patient Name: Sherie Guerrero  YOB: 1960  PCP: Ovidio Avery MD   Referring Provider:      Reason for Consultation:   Chief Complaint   Patient presents with    Chest Pain     Patient c/o midsternal chest pain intermittently throughout the day. Denies SOB or dizziness.        History of Present Illness:  This is a 63-year-old female patient following with Dr. Silva for essential thrombocythemia JAK2 negative. She is on treatment with Hydrea 500 mg BID.  She does not take ASA 81 mg as she is allergic. Platelets on 6/6/24 were 493. WBC and Hgb WNL. MCV elevated and stable from hydrea (120). Plan was to continue current dosing of Hydrea. EGD 3/1 reportedly normal. She was to return to the clinic in 3 months. Patient presented to the ED for evaluation of chest pain. Her troponins and ProBNP were slightly elevated but a stress test was positive for inferior ischemia. CTS was consulted and she underwent an urgent LHC which demonstrated severe MV CAD. Because of her LHC and unstable angina, it was thought that she would benefit from revascularization during this hospital stay. Recommend full preoperative work-up including TTE in prep for CABG 7/9/24. She may need aspirin desensitization protocol, discussed with patient. CBC with ., platelets 563. WBC and Hgb WN Consultation for any special recommendations for CPB, post-op care with a history of essential thrombocythemia.    Review of systems: Over 10 systems were reviewed and all were negative except as mention above.      Diagnostic Data:     Past Medical History:   Diagnosis Date    Diabetes (HCC)     Hernia     Hypertension     Obesity     PCOS (polycystic ovarian syndrome)        Patient Active Problem List    Diagnosis Date Noted    CAD (coronary artery disease) 07/03/2024    Chest pain 07/02/2024    Elevated troponin 07/02/2024    Pure hypercholesterolemia 07/02/2024     07/01/2024    EOSPCT 1 07/01/2024    BASOPCT 0 07/01/2024    NEUTROABS 7.36 (H) 07/01/2024    LYMPHSABS 1.13 (L) 07/01/2024    MONOSABS 0.73 07/01/2024    EOSABS 0.08 07/01/2024    BASOSABS 0.00 07/01/2024       Lab Results   Component Value Date     07/01/2024    K 4.8 07/01/2024     07/01/2024    CO2 23 07/01/2024    BUN 17 07/01/2024    CREATININE 0.7 07/01/2024    GLUCOSE 177 (H) 07/01/2024    CALCIUM 10.1 07/01/2024    BILITOT 0.5 12/18/2023    ALKPHOS 87 12/18/2023    AST 26 12/18/2023    ALT 28 12/18/2023    LABGLOM 90 07/01/2024       No results found for: \"IRON\", \"TIBC\", \"FERRITIN\"        Radiology-    Cardiac procedure    Result Date: 7/3/2024  Severe two-vessel CAD Elevated LVEDP at 17 mmHg Ejection fraction 55 to 60%    Nuclear stress test with myocardial perfusion    Result Date: 7/2/2024    Stress Combined Conclusion: Abnormal vasodilator SPECT myocardial perfusion imaging with a reversible inferior perfusion abnormality suggestive of ischemia of the right coronary distribution in addition to that of attenuation with associated regional wall motion abnormality suggestive of postischemic stunning with normal left ventricular systolic function. Based on perfusion findings in conjunction with the patient's response to vasodilator administration, an intermediate risk of ischemic events is suggested.   Perfusion Comments: The left ventricle was normal in size.  No motion artifact is present with hepatic tracer uptake adjacent to myocardial segments noted.  Moderately diminished tracer uptake is present within the basal and mid inferior segment on the post regadenoson images with minimal residually decreased tracer uptake within the basilar inferior segment at the time of resting images.  Gated images demonstrate inferior hypokinesis with normal contraction and thickening of remaining myocardial segments.  A post stress ejection fraction of 76% is calculated.   Stress Test: A pharmacological

## 2024-07-03 NOTE — CARE COORDINATION
7/3/24, SW met with patient to discuss follow up appointment with her IOP.  Charge Nurse came and informed patient that her next IOP appointment will be 7/8/24 (Monday) with counselor and 7/9/24 appointment will be with the Doctor.  Patient says she usually attend IOP M/W/Th from 8:30 am-noon.  Above information to appear on her discharge papers-patient aware.  SW to follow.      Olimpia Duncan, DIONICIO  [facility] Case Management  [phone]

## 2024-07-03 NOTE — CARE COORDINATION
7/3/24, patient admitted for chest pain.  SW met with patient in room along with patient's son.  Discussed transition of care/discharge preference and SW role.  Patient lives with son in a 1 story home with 4 steps to enter the home.  DME owned is none.  Patient still drives and is independent with ADL's.  PCP confirmed is Dr. Harrison and Pharmacy is [facility].  No hx of HHC or PILI>  Discussed HHC should patient need surgery and reviewed patient needing to ambulate 400 feet and have someone stay with patient at home for the 1st 2 weeks.  Patient has no preference on HHC and said she would want a company that takes her insurance.  Call placed to [facility] D[facility].  SOC will be 7/9/24 Tuesday.  Will need HHC order if HHC is needed.  SW to follow.      Case Management Assessment  Initial Evaluation    Date/Time of Evaluation: 7/3/2024 1:01 PM  Assessment Completed by: MONIQUE Shahid    If patient is discharged prior to next notation, then this note serves as note for discharge by case management.    Patient Name: Sherie Guerrero                   YOB: 1960  Diagnosis: Chest pain [R07.9]                   Date / Time: 7/1/2024  7:56 PM    Patient Admission Status: Observation   Readmission Risk (Low < 19, Mod (19-27), High > 27): No data recorded  Current PCP: Ovidio Avery MD  PCP verified by CM? Yes    Chart Reviewed: Yes      History Provided by: Patient  Patient Orientation: Alert and Oriented    Patient Cognition: Alert    Hospitalization in the last 30 days (Readmission):  No    If yes, Readmission Assessment in  Navigator will be completed.    Advance Directives:      Code Status: Full Code   Patient's Primary Decision Maker is:      Primary Decision Maker: Zoey Puckett - Brother/Sister - [phone]    Discharge Planning:    Patient lives with: Children Type of Home: Apartment  Primary Care Giver: Self  Patient Support Systems include: Children    Current Financial resources:    Current community resources:    Current services prior to admission: None            Current DME:              Type of Home Care services:  None    ADLS  Prior functional level: Independent in ADLs/IADLs  Current functional level: Independent in ADLs/IADLs    PT AM-PAC:   /24  OT AM-PAC:   /24    Family can provide assistance at DC: Yes  Would you like Case Management to discuss the discharge plan with any other family members/significant others, and if so, who? Yes (Son)  Plans to Return to Present Housing: Yes  Other Identified Issues/Barriers to RETURNING to current housing: N/A  Potential Assistance needed at discharge: N/A            Potential DME:    Patient expects to discharge to: Apartment  Plan for transportation at discharge:      Financial    Payor: [facility] MEDICAID / Plan: [facility] MEDICAID / Product Type: *No Product type* /     Does insurance require precert for SNF: Yes    Potential assistance Purchasing Medications:    Meds-to-Beds request: Yes      [id number] - [facility] [address] -  [phone] - F [phone]  [address] [place] [address]  Phone: [phone] Fax: [phone]    [id number] - [place], [place] - [address] [facility] - P [phone] - F [phone]  [address] [facility] [address]  Phone: [phone] Fax: [phone]      Notes:    Factors facilitating achievement of predicted outcomes: Family support    Barriers to discharge: N/A    Additional Case Management Notes: See Above    The Plan for Transition of Care is related to the following treatment goals of Chest pain [R07.9]    IF APPLICABLE: The Patient and/or patient representative Sherie and her family were provided with a choice of provider and agrees with the discharge plan. Freedom of choice list with basic dialogue that supports the patient's individualized plan of

## 2024-07-03 NOTE — PROGRESS NOTES
Patient admitted to [facility] with the following belongings:  Glasses, Hearing Aids , and Jewelry, (left hearing aid and 2 ring. The following belongings admitted with the patient, Cell Phone, Cell Phone , Pants, Shirt, Socks, and Shoes, were sent home with the patient's family.    Attending Only

## 2024-07-03 NOTE — PROGRESS NOTES
Patient transported down to CT via wheelchair with transporter and RN assist. Patient family directed to waiting room. Patient then transported to 3815 without difficulty. Inquired if receiving RN had any questions. No questions at this time.

## 2024-07-03 NOTE — CONSULTS
CTS Consult    Patient name: Sherie Guerrero    Reason for consult: MVD, aspirin allergy, please evaluate for surgical revascularization    Referring Physician: Regulo Carter    Primary Care Physician: Ovidio Avery MD    Date of service: 7/3/2024    Chief Complaint: chest pain    HPI: 64yo F with PMH DM, HTN, essential thrombocytopenia (on hydroxyurea), h/o aspirin allergy presented to the ED 7/1/2024 with CC of chest pain. Troponins were reassuring but stress testing was positive for inferior ischemia. LHC demonstrated severe MV CAD. CTS was consulted for recommendations.  Currently, she continue to have intermittent chest pain. She states that it is in her anterior chest with radiation to her jaw. Denies worsening with exertion. Denies other complaints including SOB. She does not know the reaction to aspirin. She states she woke up in the hospital.     Allergies:   Allergies   Allergen Reactions    Sulfa Antibiotics Anaphylaxis    Asa [Aspirin]     Iodine     Mushroom Extract Complex     Pcn [Penicillins]     Shellfish-Derived Products        Home medications:    Current Facility-Administered Medications   Medication Dose Route Frequency Provider Last Rate Last Admin    sodium chloride flush 0.9 % injection 5-40 mL  5-40 mL IntraVENous 2 times per day Tami Eldridge MD   10 mL at 07/03/24 1016    sodium chloride flush 0.9 % injection 5-40 mL  5-40 mL IntraVENous PRN Tami Eldridge MD        0.9 % sodium chloride infusion   IntraVENous PRN Tami Eldridge MD        acetaminophen (TYLENOL) tablet 650 mg  650 mg Oral Q4H PRN Tami Eldridge MD        0.9 % sodium chloride infusion   IntraVENous Continuous Tami Eldridge MD        heparin (porcine) injection 4,000 Units  4,000 Units IntraVENous Once Regulo Carter MD        heparin (porcine) injection 4,000 Units  4,000 Units IntraVENous PRN Regulo Carter MD        heparin (porcine) injection 2,000 Units  2,000 Units IntraVENous PRN Regulo Carter MD      LAD stenosis. iFR of the LAD was 0.76 x 3 and 0.72 twice after intracoronary glycerin.      Left Circumflex   It is a large vessel giving rise to a large bifurcating obtuse marginal branch. There was 30% discrete ostial circumflex stenosis.      Right Coronary Artery   It is a large dominant vessel giving rise to a conus branch, an RV marginal branch, a small PDA and a PLV branch. The RCA was moderately diseased mid segment with 90% discrete mid to distal RCA stenosis. There was 30% diffuse mid PLV branch stenosis      Intervention     No interventions have been documented.       Assessment: 62yo F with PMH DM, HTN, essential thrombocythemia (on hydroxyurea), h/o aspirin allergy presented to the ED 7/1/2024 with CC of chest pain. Troponins were reassuring but stress testing was positive for inferior ischemia. LHC demonstrated severe MV CAD. CTS was consulted for recommendations.    Plan: Given her findings on LHC and unstable angina, would benefit from revascularization during this hospital stay  Recommend full preoperative work-up including TTE  May need aspirin desensitization protocol, discussed with patient  Will ask hematology to see given her history of essential thrombocytopenia, any special recommendations for CPB, post-op care    Electronically signed by Martine Gleason DO on 7/3/2024 at 11:02 AM

## 2024-07-03 NOTE — PROGRESS NOTES
Subjective:    Chief complaint:    Seen in Cath Lab after procedure  She has no new complaints  No problems overnight.  Denies chest pain, angina, and dyspnea.  Denies abdominal pain.  Tolerating diet.  No nausea or vomiting.    Objective:    BP (!) 163/78   Pulse 75   Temp 97.1 °F (36.2 °C) (Temporal)   Resp 16   Ht 1.651 m (5' 5\")   Wt 96.2 kg (212 lb)   SpO2 99%   BMI 35.28 kg/m²   General : Awake ,alert,no distress.  Heart:  RRR, no murmurs, gallops, or rubs.  Lungs:  CTA bilaterally, no wheeze, rales or rhonchi  Abd: bowel sounds present, nontender, nondistended, no masses  Extrem:  No clubbing, cyanosis, or edema    CBC:   Lab Results   Component Value Date/Time    WBC 9.3 07/01/2024 09:13 PM    RBC 3.62 07/01/2024 09:13 PM    HGB 14.9 07/01/2024 09:13 PM    HCT 42.6 07/01/2024 09:13 PM    .7 07/01/2024 09:13 PM    MCH 41.2 07/01/2024 09:13 PM    MCHC 35.0 07/01/2024 09:13 PM    RDW 11.9 07/01/2024 09:13 PM     07/01/2024 09:13 PM    MPV 9.9 07/01/2024 09:13 PM     BMP:    Lab Results   Component Value Date/Time     07/01/2024 09:13 PM    K 4.8 07/01/2024 09:13 PM     07/01/2024 09:13 PM    CO2 23 07/01/2024 09:13 PM    BUN 17 07/01/2024 09:13 PM    CREATININE 0.7 07/01/2024 09:13 PM    CALCIUM 10.1 07/01/2024 09:13 PM    LABGLOM 90 07/01/2024 09:13 PM    LABGLOM >60 12/18/2023 02:00 PM    GLUCOSE 177 07/01/2024 09:13 PM    GLUCOSE 104 11/23/2010 09:48 AM     PT/INR:  No results found for: \"PROTIME\", \"INR\"  Troponin:  No results found for: \"TROPONINI\"    No results for input(s): \"LABURIN\" in the last 72 hours.  No results for input(s): \"BC\" in the last 72 hours.  No results for input(s): \"BLOODCULT2\" in the last 72 hours.      Current Facility-Administered Medications:     sodium chloride flush 0.9 % injection 5-40 mL, 5-40 mL, IntraVENous, 2 times per day, Tami Eldridge MD, 10 mL at 07/03/24 1016    sodium chloride flush 0.9 % injection 5-40 mL, 5-40 mL, IntraVENous, PRN,

## 2024-07-03 NOTE — ACP (ADVANCE CARE PLANNING)
7/3/24, Advance Care Planning   Healthcare Decision Maker:    Primary Decision Maker: Zoey Puckett - Brother/Sister - [phone]    Click here to complete Healthcare Decision Makers including selection of the Healthcare Decision Maker Relationship (ie \"Primary\").

## 2024-07-03 NOTE — PROGRESS NOTES
Attempted to send for patient. Pt recently back to floor from [facility] cath. Rn unable to send patient until vas band is off which will approx be 2 hours. Will attempt again once patient is able and schedule permits. Did make floor aware it may not be today.

## 2024-07-03 NOTE — PROGRESS NOTES
4 Eyes Skin Assessment     NAME:  Sherie Guerrero  YOB: 1960  MEDICAL RECORD NUMBER:  [id number]    The patient is being assessed for  Transfer to New Unit    I agree that at least one RN has performed a thorough Head to Toe Skin Assessment on the patient. ALL assessment sites listed below have been assessed.      Areas assessed by both nurses:    Head, Face, Ears, Shoulders, Back, Chest, Arms, Elbows, Hands, Sacrum. Buttock, Coccyx, Ischium, Legs. Feet and Heels, and Under Medical Devices         Does the Patient have a Wound? No noted wound(s)       Joe Prevention initiated by RN: Yes  Wound Care Orders initiated by RN: No    Pressure Injury (Stage 3,4, Unstageable, DTI, NWPT, and Complex wounds) if present, place Wound referral order by RN under : No    New Ostomies, if present place, Ostomy referral order under : No     Nurse 1 eSignature: Electronically signed by Cindy Mallory RN on 7/3/24 at 7:07 PM EDT    **SHARE this note so that the co-signing nurse can place an eSignature**    Nurse 2 eSignature: Electronically signed by Linnea Rodriguez RN on 7/3/24 at 9:17 PM EDT

## 2024-07-03 NOTE — PROGRESS NOTES
Nurse to nurse report called to MARINO Leiva- [phone]   Alert and oriented to person, place and time/Patient baseline mental status

## 2024-07-03 NOTE — PROGRESS NOTES
INPATIENT CARDIOLOGY FOLLOW-UP    Name: Sherie Guerrero    Age: 63 y.o.    Date of Admission: 7/1/2024  7:56 PM    Date of Service: 7/3/2024    Chief Complaint: Follow-up for precordial chest pain, abnormal troponin, hypertension, thrombocytosis, chronic pain syndrome, moderate obesity    Interim History: The patient relates additional episodes of precordial chest discomfort radiating to her neck and jaw as well as her left upper extremity associated with diaphoresis and durations of approximately 5 minutes in the face of her presentation and perfusion imaging suggesting ischemia of the right coronary distribution with associated postischemic stunning.  She has received initial components of her contrast preparation anticipation of coronary angiography later today.      Review of Systems:   The remainder of a complete multisystem review including consitutional, central nervous, respiratory, circulatory, gastrointestinal, genitourinary, endocrinologic, hematologic, musculoskeletal and psychiatric are negative.    Problem List:  Patient Active Problem List   Diagnosis    Gastroesophageal reflux disease    Primary hypertension    Type 2 diabetes mellitus without complication, without long-term current use of insulin (HCC)    Thrombocytosis    Chest pain    Elevated troponin    Pure hypercholesterolemia    Moderate obesity       Allergies:  Allergies   Allergen Reactions    Sulfa Antibiotics Anaphylaxis    Asa [Aspirin]     Iodine     Mushroom Extract Complex     Pcn [Penicillins]     Shellfish-Derived Products        Current Medications:  Current Facility-Administered Medications   Medication Dose Route Frequency Provider Last Rate Last Admin    metoprolol tartrate (LOPRESSOR) tablet 50 mg  50 mg Oral BID Art Blue MD   50 mg at 07/02/24 2026    pravastatin (PRAVACHOL) tablet 40 mg  40 mg Oral Daily Art Blue MD   40 mg at 07/02/24 1143    amLODIPine (NORVASC) tablet 10 mg  10 mg Oral

## 2024-07-04 ENCOUNTER — APPOINTMENT (OUTPATIENT)
Dept: ULTRASOUND IMAGING | Age: 64
DRG: 233 | End: 2024-07-04
Payer: COMMERCIAL

## 2024-07-04 LAB
ANION GAP SERPL CALCULATED.3IONS-SCNC: 14 MMOL/L (ref 7–16)
BILIRUB UR QL STRIP: NEGATIVE
BUN SERPL-MCNC: 25 MG/DL (ref 6–23)
CALCIUM SERPL-MCNC: 8.9 MG/DL (ref 8.6–10.2)
CHLORIDE SERPL-SCNC: 99 MMOL/L (ref 98–107)
CLARITY UR: CLEAR
CO2 SERPL-SCNC: 22 MMOL/L (ref 22–29)
COLOR UR: YELLOW
CREAT SERPL-MCNC: 0.8 MG/DL (ref 0.5–1)
ERYTHROCYTE [DISTWIDTH] IN BLOOD BY AUTOMATED COUNT: 12.1 % (ref 11.5–15)
GFR, ESTIMATED: 78 ML/MIN/1.73M2
GLUCOSE SERPL-MCNC: 235 MG/DL (ref 74–99)
GLUCOSE UR STRIP-MCNC: 250 MG/DL
HBA1C MFR BLD: 6.4 % (ref 4–5.6)
HCT VFR BLD AUTO: 36.8 % (ref 34–48)
HGB BLD-MCNC: 13.1 G/DL (ref 11.5–15.5)
HGB UR QL STRIP.AUTO: NEGATIVE
KETONES UR STRIP-MCNC: NEGATIVE MG/DL
LEUKOCYTE ESTERASE UR QL STRIP: ABNORMAL
MCH RBC QN AUTO: 42 PG (ref 26–35)
MCHC RBC AUTO-ENTMCNC: 35.6 G/DL (ref 32–34.5)
MCV RBC AUTO: 117.9 FL (ref 80–99.9)
NITRITE UR QL STRIP: NEGATIVE
PARTIAL THROMBOPLASTIN TIME: 59.6 SEC (ref 24.5–35.1)
PARTIAL THROMBOPLASTIN TIME: 81.5 SEC (ref 24.5–35.1)
PH UR STRIP: 6 [PH] (ref 5–9)
PLATELET # BLD AUTO: 565 K/UL (ref 130–450)
PMV BLD AUTO: 9.9 FL (ref 7–12)
POTASSIUM SERPL-SCNC: 4.1 MMOL/L (ref 3.5–5)
PROT UR STRIP-MCNC: 30 MG/DL
RBC # BLD AUTO: 3.12 M/UL (ref 3.5–5.5)
RBC #/AREA URNS HPF: NORMAL /HPF
RENAL EPITHELIAL, UA: PRESENT /HPF
SODIUM SERPL-SCNC: 135 MMOL/L (ref 132–146)
SP GR UR STRIP: 1.02 (ref 1–1.03)
UROBILINOGEN UR STRIP-ACNC: 0.2 EU/DL (ref 0–1)
WBC #/AREA URNS HPF: NORMAL /HPF
WBC OTHER # BLD: 14.3 K/UL (ref 4.5–11.5)

## 2024-07-04 PROCEDURE — 2580000003 HC RX 258: Performed by: INTERNAL MEDICINE

## 2024-07-04 PROCEDURE — G0378 HOSPITAL OBSERVATION PER HR: HCPCS

## 2024-07-04 PROCEDURE — 6360000002 HC RX W HCPCS: Performed by: INTERNAL MEDICINE

## 2024-07-04 PROCEDURE — 93005 ELECTROCARDIOGRAM TRACING: CPT | Performed by: INTERNAL MEDICINE

## 2024-07-04 PROCEDURE — 6370000000 HC RX 637 (ALT 250 FOR IP): Performed by: INTERNAL MEDICINE

## 2024-07-04 PROCEDURE — 93880 EXTRACRANIAL BILAT STUDY: CPT

## 2024-07-04 PROCEDURE — 83036 HEMOGLOBIN GLYCOSYLATED A1C: CPT

## 2024-07-04 PROCEDURE — 87086 URINE CULTURE/COLONY COUNT: CPT

## 2024-07-04 PROCEDURE — 99233 SBSQ HOSP IP/OBS HIGH 50: CPT | Performed by: INTERNAL MEDICINE

## 2024-07-04 PROCEDURE — 96368 THER/DIAG CONCURRENT INF: CPT

## 2024-07-04 PROCEDURE — 81001 URINALYSIS AUTO W/SCOPE: CPT

## 2024-07-04 PROCEDURE — 6370000000 HC RX 637 (ALT 250 FOR IP): Performed by: NURSE PRACTITIONER

## 2024-07-04 PROCEDURE — 96366 THER/PROPH/DIAG IV INF ADDON: CPT

## 2024-07-04 PROCEDURE — 99232 SBSQ HOSP IP/OBS MODERATE 35: CPT | Performed by: STUDENT IN AN ORGANIZED HEALTH CARE EDUCATION/TRAINING PROGRAM

## 2024-07-04 PROCEDURE — 80048 BASIC METABOLIC PNL TOTAL CA: CPT

## 2024-07-04 PROCEDURE — 93970 EXTREMITY STUDY: CPT

## 2024-07-04 PROCEDURE — 85027 COMPLETE CBC AUTOMATED: CPT

## 2024-07-04 PROCEDURE — G0378 HOSPITAL OBSERVATION PER HR: HCPCS | Performed by: INTERNAL MEDICINE

## 2024-07-04 PROCEDURE — 85730 THROMBOPLASTIN TIME PARTIAL: CPT

## 2024-07-04 RX ADMIN — AMLODIPINE BESYLATE 10 MG: 10 TABLET ORAL at 08:48

## 2024-07-04 RX ADMIN — SODIUM CHLORIDE, PRESERVATIVE FREE 10 ML: 5 INJECTION INTRAVENOUS at 21:44

## 2024-07-04 RX ADMIN — DIPHENHYDRAMINE HYDROCHLORIDE 25 MG: 25 TABLET ORAL at 23:47

## 2024-07-04 RX ADMIN — SODIUM CHLORIDE, PRESERVATIVE FREE 10 ML: 5 INJECTION INTRAVENOUS at 10:40

## 2024-07-04 RX ADMIN — DIPHENHYDRAMINE HYDROCHLORIDE 25 MG: 25 TABLET ORAL at 02:02

## 2024-07-04 RX ADMIN — PRAVASTATIN SODIUM 40 MG: 20 TABLET ORAL at 08:47

## 2024-07-04 RX ADMIN — METOPROLOL TARTRATE 50 MG: 50 TABLET, FILM COATED ORAL at 21:43

## 2024-07-04 RX ADMIN — HEPARIN SODIUM 10 UNITS/KG/HR: 10000 INJECTION, SOLUTION INTRAVENOUS at 17:44

## 2024-07-04 RX ADMIN — PANTOPRAZOLE SODIUM 40 MG: 40 TABLET, DELAYED RELEASE ORAL at 06:24

## 2024-07-04 RX ADMIN — HYDROXYUREA 1000 MG: 500 CAPSULE ORAL at 08:47

## 2024-07-04 RX ADMIN — ASPIRIN 81 MG: 81 TABLET, COATED ORAL at 08:47

## 2024-07-04 RX ADMIN — METOPROLOL TARTRATE 50 MG: 50 TABLET, FILM COATED ORAL at 08:48

## 2024-07-04 RX ADMIN — HYDROXYUREA 1000 MG: 500 CAPSULE ORAL at 21:44

## 2024-07-04 RX ADMIN — NITROGLYCERIN 30 MCG/MIN: 20 INJECTION INTRAVENOUS at 17:38

## 2024-07-04 ASSESSMENT — PAIN SCALES - GENERAL: PAINLEVEL_OUTOF10: 0

## 2024-07-04 NOTE — PROGRESS NOTES
4 Eyes Skin Assessment     NAME:  Sherie Guerrero  YOB: 1960  MEDICAL RECORD NUMBER:  [id number]    The patient is being assessed for  Transfer to New Unit    I agree that at least one RN has performed a thorough Head to Toe Skin Assessment on the patient. ALL assessment sites listed below have been assessed.      Areas assessed by both nurses:    Head, Face, Ears, Shoulders, Back, Chest, Arms, Elbows, Hands, Sacrum. Buttock, Coccyx, Ischium, and Legs. Feet and Heels        Does the Patient have a Wound? No noted wound(s)       Joe Prevention initiated by RN: No  Wound Care Orders initiated by RN: No    Pressure Injury (Stage 3,4, Unstageable, DTI, NWPT, and Complex wounds) if present, place Wound referral order by RN under : No    New Ostomies, if present place, Ostomy referral order under : No     Nurse 1 eSignature: Electronically signed by Jessica Hogan RN on 7/4/24 at 6:57 PM EDT    **SHARE this note so that the co-signing nurse can place an eSignature**    Nurse 2 eSignature: Electronically signed by Pushpa Harris RN on 7/4/24 at 7:35 PM EDT

## 2024-07-04 NOTE — PROGRESS NOTES
INPATIENT CARDIOLOGY FOLLOW-UP    Name: Sherie Guerrero    Age: 63 y.o.    Date of Admission: 7/1/2024  7:56 PM    Date of Service: 7/4/2024    Chief Complaint: Follow-up for coronary atherosclerosis with unstable angina, hypertension thrombocytosis, chronic pain syndrome, moderate obesity    Interim History: The patient's coronary angiography was extensively reviewed with the interventional cardiology service and combined decision, especially in view of her aspirin allergy recommendations of surgical intervention.  On the basis of recurrent chest discomfort she was transferred to the cardiovascular intensive care unit with stabilization with intravenous nitrates and at present no symptomatology.  Hematology assessment and recommendations have been reviewed with aspirin desensitization in progress and no significant complications.      Review of Systems:   The remainder of a complete multisystem review including consitutional, central nervous, respiratory, circulatory, gastrointestinal, genitourinary, endocrinologic, hematologic, musculoskeletal and psychiatric are negative.    Problem List:  Patient Active Problem List   Diagnosis    Gastroesophageal reflux disease    Primary hypertension    Type 2 diabetes mellitus without complication, without long-term current use of insulin (HCC)    Thrombocytosis    Chest pain    Elevated troponin    Pure hypercholesterolemia    Moderate obesity    CAD (coronary artery disease)    CAD in native artery       Allergies:  Allergies   Allergen Reactions    Sulfa Antibiotics Anaphylaxis    Asa [Aspirin]     Iodine     Mushroom Extract Complex     Pcn [Penicillins]     Shellfish-Derived Products        Current Medications:  Current Facility-Administered Medications   Medication Dose Route Frequency Provider Last Rate Last Admin    sodium chloride flush 0.9 % injection 5-40 mL  5-40 mL IntraVENous 2 times per day Tami Eldridge MD   10 mL at 07/03/24 1016    sodium chloride flush  20 mg in sodium chloride (PF) 0.9 % 10 mL injection  20 mg IntraVENous Once PRN Mar Salas APRN - CNP        aspirin EC tablet 81 mg  81 mg Oral Daily Mar Salas APRN - CNP        nitroGLYCERIN 200 mcg/mL in dextrose 5%  5-200 mcg/min IntraVENous Continuous Jamison Hodge MD 12 mL/hr at 07/03/24 2114 40 mcg/min at 07/03/24 2114    metoprolol tartrate (LOPRESSOR) tablet 50 mg  50 mg Oral BID Art Blue MD   50 mg at 07/03/24 2205    pravastatin (PRAVACHOL) tablet 40 mg  40 mg Oral Daily Art Blue MD   40 mg at 07/03/24 1022    amLODIPine (NORVASC) tablet 10 mg  10 mg Oral Daily Art Blue MD   10 mg at 07/03/24 1020    [Held by provider] metFORMIN (GLUCOPHAGE) tablet 500 mg  500 mg Oral BID WC Art Blue MD   500 mg at 07/02/24 1901    pantoprazole (PROTONIX) tablet 40 mg  40 mg Oral QAM AC Art Blue MD   40 mg at 07/04/24 0624    morphine (PF) injection 1 mg  1 mg IntraVENous Q4H PRN Art Blue MD   1 mg at 07/03/24 2205    diphenhydrAMINE (BENADRYL) tablet 25 mg  25 mg Oral Q6H PRN Regulo Carter MD   25 mg at 07/04/24 0202      sodium chloride      heparin (PORCINE) Infusion 10 Units/kg/hr (07/03/24 1531)    nitroGLYCERIN 40 mcg/min (07/03/24 2114)       Physical Exam:  BP (!) 150/72   Pulse 66   Temp 98.1 °F (36.7 °C) (Temporal)   Resp 26   Ht 1.651 m (5' 5\")   Wt 96.2 kg (212 lb)   SpO2 91%   BMI 35.28 kg/m²   Weight change:   Wt Readings from Last 3 Encounters:   07/02/24 96.2 kg (212 lb)   12/18/23 87.5 kg (193 lb)   11/21/23 89.9 kg (198 lb 3.2 oz)     The patient is awake, alert and in no discomfort or distress. No gross musculoskeletal deformity is present. No significant skin or nail changes are present. Gross examination of head, eyes, nose and throat are negative. Jugular venous pressure is normal and no carotid bruits are present. Normal respiratory effort is noted with no accessory muscle usage

## 2024-07-04 NOTE — PROGRESS NOTES
Subjective:    Chief complaint:    In icu  No pain currently    Objective:    /61   Pulse 76   Temp (!) 96.7 °F (35.9 °C) (Temporal)   Resp 16   Ht 1.651 m (5' 5\")   Wt 96.2 kg (212 lb)   SpO2 100%   BMI 35.28 kg/m²   General : Awake ,alert,no distress.  Heart:  RRR, no murmurs, gallops, or rubs.  Lungs:  CTA bilaterally, no wheeze, rales or rhonchi  Abd: bowel sounds present, nontender, nondistended, no masses  Extrem:  No clubbing, cyanosis, or edema    CBC:   Lab Results   Component Value Date/Time    WBC 14.3 07/04/2024 06:09 AM    RBC 3.12 07/04/2024 06:09 AM    HGB 13.1 07/04/2024 06:09 AM    HCT 36.8 07/04/2024 06:09 AM    .9 07/04/2024 06:09 AM    MCH 42.0 07/04/2024 06:09 AM    MCHC 35.6 07/04/2024 06:09 AM    RDW 12.1 07/04/2024 06:09 AM     07/04/2024 06:09 AM    MPV 9.9 07/04/2024 06:09 AM     BMP:    Lab Results   Component Value Date/Time     07/04/2024 06:09 AM    K 4.1 07/04/2024 06:09 AM    CL 99 07/04/2024 06:09 AM    CO2 22 07/04/2024 06:09 AM    BUN 25 07/04/2024 06:09 AM    CREATININE 0.8 07/04/2024 06:09 AM    CALCIUM 8.9 07/04/2024 06:09 AM    LABGLOM 78 07/04/2024 06:09 AM    LABGLOM >60 12/18/2023 02:00 PM    GLUCOSE 235 07/04/2024 06:09 AM    GLUCOSE 104 11/23/2010 09:48 AM     PT/INR:  No results found for: \"PROTIME\", \"INR\"  Troponin:  No results found for: \"TROPONINI\"    No results for input(s): \"LABURIN\" in the last 72 hours.  No results for input(s): \"BC\" in the last 72 hours.  No results for input(s): \"BLOODCULT2\" in the last 72 hours.      Current Facility-Administered Medications:     sodium chloride flush 0.9 % injection 5-40 mL, 5-40 mL, IntraVENous, 2 times per day, Tami Eldridge MD, 10 mL at 07/04/24 1040    sodium chloride flush 0.9 % injection 5-40 mL, 5-40 mL, IntraVENous, PRN, Tami Eldridge MD    0.9 % sodium chloride infusion, , IntraVENous, PRN, Tami Eldridge MD    acetaminophen (TYLENOL) tablet 650 mg, 650 mg, Oral, Q4H PRN, Tami Eldridge,

## 2024-07-04 NOTE — PROGRESS NOTES
[facility]  Hematology/Oncology  Consult      Patient Name: Sherie Guerrero  YOB: 1960  PCP: Ovidio Avery MD   Referring Provider:      Reason for Consultation:   Chief Complaint   Patient presents with    Chest Pain     Patient c/o midsternal chest pain intermittently throughout the day. Denies SOB or dizziness.        History of Present Illness:  This is a 63-year-old female patient following with Dr. Silva for essential thrombocythemia JAK2 negative. She is on treatment with Hydrea 500 mg BID.  She does not take ASA 81 mg as she is allergic. Platelets on 6/6/24 were 493. WBC and Hgb WNL. MCV elevated and stable from hydrea (120). Plan was to continue current dosing of Hydrea. EGD 3/1 reportedly normal. She was to return to the clinic in 3 months. Patient presented to the ED for evaluation of chest pain. Her troponins and ProBNP were slightly elevated but a stress test was positive for inferior ischemia. CTS was consulted and she underwent an urgent LHC which demonstrated severe MV CAD. Because of her LHC and unstable angina, it was thought that she would benefit from revascularization during this hospital stay. Recommend full preoperative work-up including TTE in prep for CABG 7/9/24. She may need aspirin desensitization protocol, discussed with patient. CBC with ., platelets 563. WBC and Hgb WN Consultation for any special recommendations for CPB, post-op care with a history of essential thrombocythemia.    Review of systems: Over 10 systems were reviewed and all were negative except as mention above.      Diagnostic Data:     Past Medical History:   Diagnosis Date    Diabetes (HCC)     Hernia     Hypertension     Obesity     PCOS (polycystic ovarian syndrome)        Patient Active Problem List    Diagnosis Date Noted    CAD (coronary artery disease) 07/03/2024    Chest pain 07/02/2024    Elevated troponin 07/02/2024    Pure hypercholesterolemia 07/02/2024     ProBNP were slightly elevated but a stress test was positive for inferior ischemia.   - CTS was consulted and she underwent an urgent LHC which demonstrated severe MV CAD. Because of her LHC and unstable angina, it was thought that she would benefit from revascularization during this hospital stay. Recommend full preoperative work-up including TTE in prep for CABG 7/9/24. She may need aspirin desensitization protocol, discussed with patient.   - CBC with , platelets 563. WBC and Hgb WN       Attending addendum:  63 years old female with JAK2 negative essential thrombocythemia on hydroxyurea, (reports allergy to aspirin) admitted with unstable angina with multivessel coronary artery disease noted on cardiac cath, plan for CABG.    Platelets 663,000.  Worsening thrombocytosis probably related to stress from unstable angina.  Would attempt to improve platelet count prior to surgery to decrease chances of perioperative thrombosis.  Increase Hydrea to 1 g twice daily.  Will continue to follow.    7/4/24  - JAK2 negative essential thrombocythemia on hydroxyurea, (reports allergy to aspirin)   - Cardiology/CTS following for unstable angina with multivessel coronary artery disease noted on cardiac cath, plan for CABG.  - Hydrea was increased to attempt to improve platelet count prior to surgery to decrease chances of perioperative thrombosis.  Plt today 565k, improving.   - Continue current dose of Hydrea1 g twice daily.   -Surgery scheduled on Tuesday.   - Will continue to follow.    Thank you for the consult.    MINOR Viera - CNP  Electronically signed 7/4/2024 at 7:03 AM  Patient seen and examined.  Agree with CNP assessment and plan.  Note updated.  Dru Rodriguez MD

## 2024-07-04 NOTE — PROGRESS NOTES
CC: MV CAD    Brief HPI:  Awake, alert. No complaints. Tolerating aspirin, minor itching at fingertips and top of head.    Past Medical History:   Diagnosis Date    Diabetes (HCC)     Hernia     Hypertension     Obesity     PCOS (polycystic ovarian syndrome)      Past Surgical History:   Procedure Laterality Date    CARDIAC PROCEDURE N/A 7/3/2024    Left heart cath / coronary angiography performed by Tami Eldridge MD at [facility] CARDIAC CATH LAB    CARDIAC PROCEDURE N/A 7/3/2024    Fractional flow reserve (FFR) performed by Tami Eldridge MD at [facility] CARDIAC CATH LAB    CHOLECYSTECTOMY      CYST REMOVAL      ON NECK    FOOT SURGERY       Social History     Socioeconomic History    Marital status:      Spouse name: Not on file    Number of children: Not on file    Years of education: Not on file    Highest education level: Not on file   Occupational History    Not on file   Tobacco Use    Smoking status: Former     Current packs/day: 1.00     Average packs/day: 1 pack/day for 18.0 years (18.0 ttl pk-yrs)     Types: Cigarettes    Smokeless tobacco: Never   Substance and Sexual Activity    Alcohol use: Not Currently     Comment: SOCIALLY    Drug use: No    Sexual activity: Yes     Partners: Male   Other Topics Concern    Not on file   Social History Narrative    Not on file     Social Determinants of Health     Financial Resource Strain: Not on file   Food Insecurity: No Food Insecurity (7/2/2024)    Hunger Vital Sign     Worried About Running Out of Food in the Last Year: Never true     Ran Out of Food in the Last Year: Never true   Transportation Needs: No Transportation Needs (7/2/2024)    PRAPARE - Transportation     Lack of Transportation (Medical): No     Lack of Transportation (Non-Medical): No   Physical Activity: Not on file   Stress: Not on file   Social Connections: Not on file   Intimate Partner Violence: Not on file   Housing Stability: Low Risk  (7/2/2024)    Housing Stability Vital Sign     Unable to

## 2024-07-05 ENCOUNTER — APPOINTMENT (OUTPATIENT)
Dept: INTERVENTIONAL RADIOLOGY/VASCULAR | Age: 64
DRG: 233 | End: 2024-07-05
Payer: COMMERCIAL

## 2024-07-05 ENCOUNTER — APPOINTMENT (OUTPATIENT)
Age: 64
DRG: 233 | End: 2024-07-05
Attending: INTERNAL MEDICINE
Payer: COMMERCIAL

## 2024-07-05 PROBLEM — Z88.8 ASPIRIN ALLERGY: Status: ACTIVE | Noted: 2024-07-05

## 2024-07-05 LAB
ECHO AO ASC DIAM: 2.8 CM
ECHO AO ASCENDING AORTA INDEX: 1.38 CM/M2
ECHO AV AREA PEAK VELOCITY: 1.6 CM2
ECHO AV AREA VTI: 1.7 CM2
ECHO AV AREA/BSA PEAK VELOCITY: 0.8 CM2/M2
ECHO AV AREA/BSA VTI: 0.8 CM2/M2
ECHO AV CUSP MM: 1.4 CM
ECHO AV MEAN GRADIENT: 9 MMHG
ECHO AV MEAN VELOCITY: 1.4 M/S
ECHO AV PEAK GRADIENT: 16 MMHG
ECHO AV PEAK VELOCITY: 2 M/S
ECHO AV VELOCITY RATIO: 0.55
ECHO AV VTI: 39.5 CM
ECHO BSA: 2.1 M2
ECHO LA DIAMETER INDEX: 1.92 CM/M2
ECHO LA DIAMETER: 3.9 CM
ECHO LA VOL A-L A2C: 41 ML (ref 22–52)
ECHO LA VOL A-L A4C: 38 ML (ref 22–52)
ECHO LA VOL MOD A2C: 39 ML (ref 22–52)
ECHO LA VOL MOD A4C: 35 ML (ref 22–52)
ECHO LA VOLUME AREA LENGTH: 40 ML
ECHO LA VOLUME INDEX A-L A2C: 20 ML/M2 (ref 16–34)
ECHO LA VOLUME INDEX A-L A4C: 19 ML/M2 (ref 16–34)
ECHO LA VOLUME INDEX AREA LENGTH: 20 ML/M2 (ref 16–34)
ECHO LA VOLUME INDEX MOD A2C: 19 ML/M2 (ref 16–34)
ECHO LA VOLUME INDEX MOD A4C: 17 ML/M2 (ref 16–34)
ECHO LV FRACTIONAL SHORTENING: 34 % (ref 28–44)
ECHO LV INTERNAL DIMENSION DIASTOLE INDEX: 2.02 CM/M2
ECHO LV INTERNAL DIMENSION DIASTOLIC: 4.1 CM (ref 3.9–5.3)
ECHO LV INTERNAL DIMENSION SYSTOLIC INDEX: 1.33 CM/M2
ECHO LV INTERNAL DIMENSION SYSTOLIC: 2.7 CM
ECHO LV IVSD: 1 CM (ref 0.6–0.9)
ECHO LV MASS 2D: 123 G (ref 67–162)
ECHO LV MASS INDEX 2D: 60.6 G/M2 (ref 43–95)
ECHO LV POSTERIOR WALL DIASTOLIC: 0.9 CM (ref 0.6–0.9)
ECHO LV RELATIVE WALL THICKNESS RATIO: 0.44
ECHO LVOT AREA: 2.8 CM2
ECHO LVOT AV VTI INDEX: 0.58
ECHO LVOT DIAM: 1.9 CM
ECHO LVOT MEAN GRADIENT: 3 MMHG
ECHO LVOT PEAK GRADIENT: 5 MMHG
ECHO LVOT PEAK VELOCITY: 1.1 M/S
ECHO LVOT STROKE VOLUME INDEX: 32 ML/M2
ECHO LVOT SV: 64.9 ML
ECHO LVOT VTI: 22.9 CM
ECHO MV "A" WAVE DURATION: 117.7 MSEC
ECHO MV A VELOCITY: 0.89 M/S
ECHO MV AREA PHT: 3.3 CM2
ECHO MV AREA VTI: 2.4 CM2
ECHO MV E DECELERATION TIME (DT): 199.5 MS
ECHO MV E VELOCITY: 0.73 M/S
ECHO MV E/A RATIO: 0.82
ECHO MV LVOT VTI INDEX: 1.18
ECHO MV MAX VELOCITY: 1 M/S
ECHO MV MEAN GRADIENT: 2 MMHG
ECHO MV MEAN VELOCITY: 0.7 M/S
ECHO MV PEAK GRADIENT: 4 MMHG
ECHO MV PRESSURE HALF TIME (PHT): 67.4 MS
ECHO MV VTI: 27 CM
ECHO PULMONARY ARTERY END DIASTOLIC PRESSURE: 7 MMHG
ECHO PV MAX VELOCITY: 1.4 M/S
ECHO PV MEAN GRADIENT: 4 MMHG
ECHO PV MEAN VELOCITY: 0.9 M/S
ECHO PV PEAK GRADIENT: 7 MMHG
ECHO PV REGURGITANT MAX VELOCITY: 1.3 M/S
ECHO PV VTI: 22.9 CM
ECHO RV INTERNAL DIMENSION: 2.3 CM
EKG ATRIAL RATE: 73 BPM
EKG P AXIS: 39 DEGREES
EKG P-R INTERVAL: 144 MS
EKG Q-T INTERVAL: 400 MS
EKG QRS DURATION: 84 MS
EKG QTC CALCULATION (BAZETT): 440 MS
EKG R AXIS: 2 DEGREES
EKG T AXIS: -29 DEGREES
EKG VENTRICULAR RATE: 73 BPM
ERYTHROCYTE [DISTWIDTH] IN BLOOD BY AUTOMATED COUNT: 12.1 % (ref 11.5–15)
HCT VFR BLD AUTO: 35.2 % (ref 34–48)
HGB BLD-MCNC: 12.7 G/DL (ref 11.5–15.5)
MCH RBC QN AUTO: 42.8 PG (ref 26–35)
MCHC RBC AUTO-ENTMCNC: 36.1 G/DL (ref 32–34.5)
MCV RBC AUTO: 118.5 FL (ref 80–99.9)
MICROORGANISM SPEC CULT: ABNORMAL
MICROORGANISM SPEC CULT: ABNORMAL
MICROORGANISM SPEC CULT: NORMAL
PARTIAL THROMBOPLASTIN TIME: 78 SEC (ref 24.5–35.1)
PLATELET # BLD AUTO: 436 K/UL (ref 130–450)
PMV BLD AUTO: 9.7 FL (ref 7–12)
RBC # BLD AUTO: 2.97 M/UL (ref 3.5–5.5)
SERVICE CMNT-IMP: ABNORMAL
SPECIMEN DESCRIPTION: ABNORMAL
SPECIMEN DESCRIPTION: NORMAL
WBC OTHER # BLD: 9.2 K/UL (ref 4.5–11.5)

## 2024-07-05 PROCEDURE — 6370000000 HC RX 637 (ALT 250 FOR IP): Performed by: NURSE PRACTITIONER

## 2024-07-05 PROCEDURE — 93306 TTE W/DOPPLER COMPLETE: CPT

## 2024-07-05 PROCEDURE — 6370000000 HC RX 637 (ALT 250 FOR IP): Performed by: INTERNAL MEDICINE

## 2024-07-05 PROCEDURE — 2580000003 HC RX 258: Performed by: NURSE PRACTITIONER

## 2024-07-05 PROCEDURE — 94729 DIFFUSING CAPACITY: CPT

## 2024-07-05 PROCEDURE — 94375 RESPIRATORY FLOW VOLUME LOOP: CPT

## 2024-07-05 PROCEDURE — 2580000003 HC RX 258: Performed by: INTERNAL MEDICINE

## 2024-07-05 PROCEDURE — 93922 UPR/L XTREMITY ART 2 LEVELS: CPT

## 2024-07-05 PROCEDURE — G0378 HOSPITAL OBSERVATION PER HR: HCPCS

## 2024-07-05 PROCEDURE — 93306 TTE W/DOPPLER COMPLETE: CPT | Performed by: INTERNAL MEDICINE

## 2024-07-05 PROCEDURE — 96366 THER/PROPH/DIAG IV INF ADDON: CPT

## 2024-07-05 PROCEDURE — 93010 ELECTROCARDIOGRAM REPORT: CPT | Performed by: INTERNAL MEDICINE

## 2024-07-05 PROCEDURE — 99233 SBSQ HOSP IP/OBS HIGH 50: CPT | Performed by: INTERNAL MEDICINE

## 2024-07-05 PROCEDURE — 85027 COMPLETE CBC AUTOMATED: CPT

## 2024-07-05 PROCEDURE — 2140000000 HC CCU INTERMEDIATE R&B

## 2024-07-05 PROCEDURE — 99232 SBSQ HOSP IP/OBS MODERATE 35: CPT | Performed by: STUDENT IN AN ORGANIZED HEALTH CARE EDUCATION/TRAINING PROGRAM

## 2024-07-05 PROCEDURE — 93923 UPR/LXTR ART STDY 3+ LVLS: CPT

## 2024-07-05 PROCEDURE — 85730 THROMBOPLASTIN TIME PARTIAL: CPT

## 2024-07-05 PROCEDURE — 6360000002 HC RX W HCPCS: Performed by: NURSE PRACTITIONER

## 2024-07-05 PROCEDURE — 36415 COLL VENOUS BLD VENIPUNCTURE: CPT

## 2024-07-05 PROCEDURE — 96368 THER/DIAG CONCURRENT INF: CPT

## 2024-07-05 RX ORDER — HYDROXYUREA 500 MG/1
500 CAPSULE ORAL 2 TIMES DAILY
Status: DISCONTINUED | OUTPATIENT
Start: 2024-07-05 | End: 2024-07-09

## 2024-07-05 RX ADMIN — PANTOPRAZOLE SODIUM 40 MG: 40 TABLET, DELAYED RELEASE ORAL at 06:56

## 2024-07-05 RX ADMIN — NITROGLYCERIN 30 MCG/MIN: 20 INJECTION INTRAVENOUS at 21:03

## 2024-07-05 RX ADMIN — HYDROXYUREA 500 MG: 500 CAPSULE ORAL at 20:29

## 2024-07-05 RX ADMIN — ASPIRIN 81 MG: 81 TABLET, COATED ORAL at 09:54

## 2024-07-05 RX ADMIN — AMLODIPINE BESYLATE 10 MG: 10 TABLET ORAL at 09:54

## 2024-07-05 RX ADMIN — HYDROXYUREA 1000 MG: 500 CAPSULE ORAL at 09:54

## 2024-07-05 RX ADMIN — METOPROLOL TARTRATE 50 MG: 25 TABLET, FILM COATED ORAL at 20:29

## 2024-07-05 RX ADMIN — HEPARIN SODIUM 10 UNITS/KG/HR: 10000 INJECTION, SOLUTION INTRAVENOUS at 21:01

## 2024-07-05 RX ADMIN — SODIUM CHLORIDE, PRESERVATIVE FREE 10 ML: 5 INJECTION INTRAVENOUS at 09:56

## 2024-07-05 RX ADMIN — SENNOSIDES AND DOCUSATE SODIUM 2 TABLET: 50; 8.6 TABLET ORAL at 20:29

## 2024-07-05 RX ADMIN — PRAVASTATIN SODIUM 40 MG: 20 TABLET ORAL at 09:54

## 2024-07-05 RX ADMIN — METOPROLOL TARTRATE 50 MG: 50 TABLET, FILM COATED ORAL at 09:54

## 2024-07-05 RX ADMIN — SODIUM CHLORIDE, PRESERVATIVE FREE 10 ML: 5 INJECTION INTRAVENOUS at 20:29

## 2024-07-05 ASSESSMENT — PAIN SCALES - GENERAL
PAINLEVEL_OUTOF10: 0

## 2024-07-05 NOTE — CARE COORDINATION
07/05/24 CM Update:  Pt is for CABG 07/09/24 Pt was independent from home plan is to return at discharge with family to transport Referral was made to [facility] NICOLE/BRUNO to follow Electronically signed by Luis Frank RN CM on 7/5/2024 at 3:55 PM

## 2024-07-05 NOTE — PLAN OF CARE
Problem: Chronic Conditions and Co-morbidities  Goal: Patient's chronic conditions and co-morbidity symptoms are monitored and maintained or improved  Outcome: Progressing     Problem: Safety - Adult  Goal: Free from fall injury  Outcome: Progressing     Problem: ABCDS Injury Assessment  Goal: Absence of physical injury  Outcome: Progressing     Problem: Pain  Goal: Verbalizes/displays adequate comfort level or baseline comfort level  Outcome: Progressing     Problem: Discharge Planning  Goal: Discharge to home or other facility with appropriate resources  Outcome: Progressing     Problem: Metabolic/Fluid and Electrolytes - Adult  Goal: Electrolytes maintained within normal limits  Outcome: Progressing  Goal: Hemodynamic stability and optimal renal function maintained  Outcome: Progressing     Problem: Hematologic - Adult  Goal: Maintains hematologic stability  Outcome: Progressing

## 2024-07-05 NOTE — PROGRESS NOTES
CC: CP    Brief HPI:  Patient seen, discussed with Dr. Gleason. Awake, alert. No complaints.      Past Medical History:   Diagnosis Date    Diabetes (HCC)     Hernia     Hypertension     Obesity     PCOS (polycystic ovarian syndrome)      Past Surgical History:   Procedure Laterality Date    CARDIAC PROCEDURE N/A 7/3/2024    Left heart cath / coronary angiography performed by Tami Eldridge MD at [facility] CARDIAC CATH LAB    CARDIAC PROCEDURE N/A 7/3/2024    Fractional flow reserve (FFR) performed by Tami Eldridge MD at [facility] CARDIAC CATH LAB    CHOLECYSTECTOMY      CYST REMOVAL      ON NECK    FOOT SURGERY       Social History     Socioeconomic History    Marital status:      Spouse name: Not on file    Number of children: Not on file    Years of education: Not on file    Highest education level: Not on file   Occupational History    Not on file   Tobacco Use    Smoking status: Former     Current packs/day: 1.00     Average packs/day: 1 pack/day for 18.0 years (18.0 ttl pk-yrs)     Types: Cigarettes    Smokeless tobacco: Never   Substance and Sexual Activity    Alcohol use: Not Currently     Comment: SOCIALLY    Drug use: No    Sexual activity: Yes     Partners: Male   Other Topics Concern    Not on file   Social History Narrative    Not on file     Social Determinants of Health     Financial Resource Strain: Not on file   Food Insecurity: No Food Insecurity (7/2/2024)    Hunger Vital Sign     Worried About Running Out of Food in the Last Year: Never true     Ran Out of Food in the Last Year: Never true   Transportation Needs: No Transportation Needs (7/2/2024)    PRAPARE - Transportation     Lack of Transportation (Medical): No     Lack of Transportation (Non-Medical): No   Physical Activity: Not on file   Stress: Not on file   Social Connections: Not on file   Intimate Partner Violence: Not on file   Housing Stability: Low Risk  (7/2/2024)    Housing Stability Vital Sign     Unable to Pay for Housing in the

## 2024-07-05 NOTE — PROGRESS NOTES
tracer uptake within the basilar inferior segment at the time of resting images.  Gated images demonstrate inferior hypokinesis with normal contraction and thickening of remaining myocardial segments.  A post stress ejection fraction of 76% is calculated.   Stress Test: A pharmacological stress test was performed using regadenoson (Lexiscan). PO caffeine given as a reversal agent.   Resting ECG: A resting electrocardiogram demonstrated evidence of sinus rhythm was otherwise unremarkable.  No chest discomfort was noted in conjunction with regadenoson administration.  Occasional supraventricular ectopy was present with a normal hemodynamic response to vasodilator administration noted.  Nondiagnostic ST changes not suggestive of ischemia were present in conjunction with vasodilator administration.   Stress ECG: Electrocardiographically normal regadenoson administration with a clinically nonischemic response.   Stress Test: A pharmacological stress test was performed using regadenoson (Lexiscan). PO caffeine given as a reversal agent.     XR CHEST PORTABLE    Result Date: 7/1/2024  EXAMINATION: ONE XRAY VIEW OF THE CHEST 7/1/2024 10:10 pm COMPARISON: None. HISTORY: ORDERING SYSTEM PROVIDED HISTORY: mid line chest pressure TECHNOLOGIST PROVIDED HISTORY: Reason for exam:->mid line chest pressure FINDINGS: Single AP upright portable chest demonstrate satisfactory expansion of the lungs which are clear.  The cardiac silhouette appears unremarkable.  There is no evidence of a pneumothorax.     No acute cardiopulmonary disease.         ASSESSMENT/PLAN :   63-year-old female   - Essential thrombocythemia JAK2 negative. She is on treatment with Hydrea 500 mg BID.  She does not take ASA 81 mg as she is allergic. Platelets on 6/6/24 were 493. WBC and Hgb WNL. MCV elevated and stable from hydrea (120). Plan was to continue current dosing of Hydrea. EGD 3/1 reportedly normal. She was to return to the clinic in 3 months.     - Chest  bid.  -Surgery scheduled on Tuesday.   - Will continue to follow.     Thank you for the consult.    MINOR Viera - CNP  Electronically signed 7/5/2024 at 12:33 PM  Patient seen and examined.  Agree with CNP assessment and plan.  Note updated.  Dru Rodriguez MD

## 2024-07-05 NOTE — PROGRESS NOTES
Subjective:    Chief complaint:    No chest pain  Feels fatigued    Objective:    BP (!) 135/56   Pulse 72   Temp 97.9 °F (36.6 °C) (Temporal)   Resp 18   Ht 1.651 m (5' 5\")   Wt 96.2 kg (212 lb)   SpO2 93%   BMI 35.28 kg/m²   General : Awake ,alert,no distress.  Heart:  RRR, no murmurs, gallops, or rubs.  Lungs:  CTA bilaterally, no wheeze, rales or rhonchi  Abd: bowel sounds present, nontender, nondistended, no masses  Extrem:  No clubbing, cyanosis, or edema    CBC:   Lab Results   Component Value Date/Time    WBC 9.2 07/05/2024 07:34 AM    RBC 2.97 07/05/2024 07:34 AM    HGB 12.7 07/05/2024 07:34 AM    HCT 35.2 07/05/2024 07:34 AM    .5 07/05/2024 07:34 AM    MCH 42.8 07/05/2024 07:34 AM    MCHC 36.1 07/05/2024 07:34 AM    RDW 12.1 07/05/2024 07:34 AM     07/05/2024 07:34 AM    MPV 9.7 07/05/2024 07:34 AM     BMP:    Lab Results   Component Value Date/Time     07/04/2024 06:09 AM    K 4.1 07/04/2024 06:09 AM    CL 99 07/04/2024 06:09 AM    CO2 22 07/04/2024 06:09 AM    BUN 25 07/04/2024 06:09 AM    CREATININE 0.8 07/04/2024 06:09 AM    CALCIUM 8.9 07/04/2024 06:09 AM    LABGLOM 78 07/04/2024 06:09 AM    LABGLOM >60 12/18/2023 02:00 PM    GLUCOSE 235 07/04/2024 06:09 AM    GLUCOSE 104 11/23/2010 09:48 AM     PT/INR:  No results found for: \"PROTIME\", \"INR\"  Troponin:  No results found for: \"TROPONINI\"    No results for input(s): \"LABURIN\" in the last 72 hours.  No results for input(s): \"BC\" in the last 72 hours.  No results for input(s): \"BLOODCULT2\" in the last 72 hours.      Current Facility-Administered Medications:     sodium chloride flush 0.9 % injection 5-40 mL, 5-40 mL, IntraVENous, 2 times per day, Tami Eldridge MD, 10 mL at 07/04/24 2144    sodium chloride flush 0.9 % injection 5-40 mL, 5-40 mL, IntraVENous, PRN, Tami Eldridge MD    0.9 % sodium chloride infusion, , IntraVENous, PRN, Tami Eldridge MD    acetaminophen (TYLENOL) tablet 650 mg, 650 mg, Oral, Q4H PRN, Tami Eldridge,  10 mg, 10 mg, Oral, Daily, Art Blue MD, 10 mg at 07/04/24 0848    [Held by provider] metFORMIN (GLUCOPHAGE) tablet 500 mg, 500 mg, Oral, BID WC, Art Blue MD, 500 mg at 07/02/24 1901    pantoprazole (PROTONIX) tablet 40 mg, 40 mg, Oral, QAM AC, Art Blue MD, 40 mg at 07/05/24 0656    morphine (PF) injection 1 mg, 1 mg, IntraVENous, Q4H PRN, Art Blue MD, 1 mg at 07/03/24 2205    diphenhydrAMINE (BENADRYL) tablet 25 mg, 25 mg, Oral, Q6H PRN, Regulo Carter MD, 25 mg at 07/04/24 2347    ADULT DIET; Regular; Low Fat/Low Chol/High Fiber/2 gm Na    Vascular duplex vein mapping lower bilateral         Vascular duplex carotid bilateral   Final Result   The right internal carotid artery demonstrates 0-50% stenosis.      The left internal carotid artery demonstrates 0-50% stenosis.      Bilateral vertebral arteries are patent with flow in the normal direction.         CT CHEST WO CONTRAST   Final Result   1. No sign of aneurysmal dilatation of the thoracic aorta.   2. Mild coronary calcification and mild calcification of the aortic valve   annulus.   3. Small nonobstructive calculus in the upper pole of the left kidney,   incompletely examined.         XR CHEST PORTABLE   Final Result   No acute cardiopulmonary disease.         Vascular ankle brachial index (KAIT)    (Results Pending)   Vascular upper extremity arterial segmental pressures with PPG    (Results Pending)       Assessment:    Principal Problem:    Chest pain  Active Problems:    Primary hypertension    Type 2 diabetes mellitus without complication, without long-term current use of insulin (HCC)    Elevated troponin    Pure hypercholesterolemia    Moderate obesity    CAD (coronary artery disease)    CAD in native artery    Aspirin allergy  Resolved Problems:    * No resolved hospital problems. *      Plan:    For cabg tuesday      Art Blue MD  8:22 AM  7/5/2024    NOTE: This report was

## 2024-07-05 NOTE — PROGRESS NOTES
Subjective:    Chief complaint:    No chest pain  Feels fatigued    Objective:    BP (!) 135/56   Pulse 72   Temp 97.9 °F (36.6 °C) (Temporal)   Resp 18   Ht 1.651 m (5' 5\")   Wt 96.2 kg (212 lb)   SpO2 93%   BMI 35.28 kg/m²   General : Awake ,alert,no distress.  Heart:  RRR, no murmurs, gallops, or rubs.  Lungs:  CTA bilaterally, no wheeze, rales or rhonchi  Abd: bowel sounds present, nontender, nondistended, no masses  Extrem:  No clubbing, cyanosis, or edema    CBC:   Lab Results   Component Value Date/Time    WBC 9.2 07/05/2024 07:34 AM    RBC 2.97 07/05/2024 07:34 AM    HGB 12.7 07/05/2024 07:34 AM    HCT 35.2 07/05/2024 07:34 AM    .5 07/05/2024 07:34 AM    MCH 42.8 07/05/2024 07:34 AM    MCHC 36.1 07/05/2024 07:34 AM    RDW 12.1 07/05/2024 07:34 AM     07/05/2024 07:34 AM    MPV 9.7 07/05/2024 07:34 AM     BMP:    Lab Results   Component Value Date/Time     07/04/2024 06:09 AM    K 4.1 07/04/2024 06:09 AM    CL 99 07/04/2024 06:09 AM    CO2 22 07/04/2024 06:09 AM    BUN 25 07/04/2024 06:09 AM    CREATININE 0.8 07/04/2024 06:09 AM    CALCIUM 8.9 07/04/2024 06:09 AM    LABGLOM 78 07/04/2024 06:09 AM    LABGLOM >60 12/18/2023 02:00 PM    GLUCOSE 235 07/04/2024 06:09 AM    GLUCOSE 104 11/23/2010 09:48 AM     PT/INR:  No results found for: \"PROTIME\", \"INR\"  Troponin:  No results found for: \"TROPONINI\"    No results for input(s): \"LABURIN\" in the last 72 hours.  No results for input(s): \"BC\" in the last 72 hours.  No results for input(s): \"BLOODCULT2\" in the last 72 hours.      Current Facility-Administered Medications:     metoprolol tartrate (LOPRESSOR) tablet 50 mg, 50 mg, Oral, BID, Izabel Salasissa CECILIA, APRN - CNP    sodium chloride flush 0.9 % injection 5-40 mL, 5-40 mL, IntraVENous, 2 times per day, Mar Salas APRN - CNP, 10 mL at 07/05/24 0956    sodium chloride flush 0.9 % injection 5-40 mL, 5-40 mL, IntraVENous, PRN, Izabel Salasissa A, APRN - CNP    0.9 % sodium chloride

## 2024-07-05 NOTE — PLAN OF CARE
Problem: Pain  Goal: Verbalizes/displays adequate comfort level or baseline comfort level  7/4/2024 2350 by Maribel De Leon, RN  Outcome: Progressing  7/4/2024 2349 by Maribel De Leon, RN  Outcome: Progressing

## 2024-07-05 NOTE — PROGRESS NOTES
INPATIENT CARDIOLOGY FOLLOW-UP    Name: Sherie Guerrero    Age: 63 y.o.    Date of Admission: 7/1/2024  7:56 PM    Date of Service: 7/5/2024    Chief Complaint: Follow-up for coronary atherosclerosis with unstable angina, hypertension, thrombocytosis, chronic pain syndrome, moderate obesity    Interim History: The patient remains compensated from a cardiovascular standpoint with no further anginal symptomatology  She has noted minor itching during the course of her aspirin desensitization and following modification of her hydroxyurea dosage improvement of her platelet counts.      Review of Systems:   The remainder of a complete multisystem review including consitutional, central nervous, respiratory, circulatory, gastrointestinal, genitourinary, endocrinologic, hematologic, musculoskeletal and psychiatric are negative.    Problem List:  Patient Active Problem List   Diagnosis    Gastroesophageal reflux disease    Primary hypertension    Type 2 diabetes mellitus without complication, without long-term current use of insulin (HCC)    Thrombocytosis    Chest pain    Elevated troponin    Pure hypercholesterolemia    Moderate obesity    CAD (coronary artery disease)    CAD in native artery       Allergies:  Allergies   Allergen Reactions    Sulfa Antibiotics Anaphylaxis    Asa [Aspirin]     Iodine     Mushroom Extract Complex     Pcn [Penicillins]     Shellfish-Derived Products        Current Medications:  Current Facility-Administered Medications   Medication Dose Route Frequency Provider Last Rate Last Admin    sodium chloride flush 0.9 % injection 5-40 mL  5-40 mL IntraVENous 2 times per day Tami Eldridge MD   10 mL at 07/04/24 2144    sodium chloride flush 0.9 % injection 5-40 mL  5-40 mL IntraVENous PRN Tami Eldridge MD        0.9 % sodium chloride infusion   IntraVENous PRN Tami Eldridge MD        acetaminophen (TYLENOL) tablet 650 mg  650 mg Oral Q4H PRN Tami Eldridge MD        heparin (porcine) injection  mg  10 mg Oral Daily Art Blue MD   10 mg at 07/04/24 0848    [Held by provider] metFORMIN (GLUCOPHAGE) tablet 500 mg  500 mg Oral BID WC Art Blue MD   500 mg at 07/02/24 1901    pantoprazole (PROTONIX) tablet 40 mg  40 mg Oral QAM AC Art Blue MD   40 mg at 07/05/24 0656    morphine (PF) injection 1 mg  1 mg IntraVENous Q4H PRN Art Blue MD   1 mg at 07/03/24 2205    diphenhydrAMINE (BENADRYL) tablet 25 mg  25 mg Oral Q6H PRN Regulo Carter MD   25 mg at 07/04/24 2347      sodium chloride      heparin (PORCINE) Infusion 10 Units/kg/hr (07/04/24 2100)    nitroGLYCERIN 30 mcg/min (07/04/24 2100)       Physical Exam:  BP (!) 135/56   Pulse 72   Temp 97.9 °F (36.6 °C) (Temporal)   Resp 18   Ht 1.651 m (5' 5\")   Wt 96.2 kg (212 lb)   SpO2 93%   BMI 35.28 kg/m²   Weight change:   Wt Readings from Last 3 Encounters:   07/02/24 96.2 kg (212 lb)   12/18/23 87.5 kg (193 lb)   11/21/23 89.9 kg (198 lb 3.2 oz)     The patient is awake, alert and in no discomfort or distress. No gross musculoskeletal deformity is present. No significant skin or nail changes are present. Gross examination of head, eyes, nose and throat are negative. Jugular venous pressure is normal and no carotid bruits are present. Normal respiratory effort is noted with no accessory muscle usage present. Lung fields are clear to ascultation. Cardiac examination is notable for a regular rate and rhythm with no palpable thrill. No gallop rhythm or cardiac murmur are identified. A benign abdominal examination is present with the exception of obesity and no masses or organomegaly. Intact pulses are present throughout all extremities and no peripheral edema is present. No focal neurologic deficits are present.    Intake/Output:    Intake/Output Summary (Last 24 hours) at 7/5/2024 0815  Last data filed at 7/5/2024 0655  Gross per 24 hour   Intake 1042.34 ml   Output 1100 ml   Net -57.66 ml     No

## 2024-07-06 LAB
ANION GAP SERPL CALCULATED.3IONS-SCNC: 10 MMOL/L (ref 7–16)
BUN SERPL-MCNC: 27 MG/DL (ref 6–23)
CALCIUM SERPL-MCNC: 8.8 MG/DL (ref 8.6–10.2)
CHLORIDE SERPL-SCNC: 104 MMOL/L (ref 98–107)
CO2 SERPL-SCNC: 22 MMOL/L (ref 22–29)
CREAT SERPL-MCNC: 0.9 MG/DL (ref 0.5–1)
GFR, ESTIMATED: 73 ML/MIN/1.73M2
GLUCOSE SERPL-MCNC: 219 MG/DL (ref 74–99)
PARTIAL THROMBOPLASTIN TIME: 72.8 SEC (ref 24.5–35.1)
POTASSIUM SERPL-SCNC: 4.1 MMOL/L (ref 3.5–5)
SODIUM SERPL-SCNC: 136 MMOL/L (ref 132–146)

## 2024-07-06 PROCEDURE — 6370000000 HC RX 637 (ALT 250 FOR IP): Performed by: INTERNAL MEDICINE

## 2024-07-06 PROCEDURE — 2580000003 HC RX 258: Performed by: NURSE PRACTITIONER

## 2024-07-06 PROCEDURE — 80048 BASIC METABOLIC PNL TOTAL CA: CPT

## 2024-07-06 PROCEDURE — 6370000000 HC RX 637 (ALT 250 FOR IP): Performed by: NURSE PRACTITIONER

## 2024-07-06 PROCEDURE — 36415 COLL VENOUS BLD VENIPUNCTURE: CPT

## 2024-07-06 PROCEDURE — 85730 THROMBOPLASTIN TIME PARTIAL: CPT

## 2024-07-06 PROCEDURE — 2140000000 HC CCU INTERMEDIATE R&B

## 2024-07-06 PROCEDURE — 6360000002 HC RX W HCPCS: Performed by: NURSE PRACTITIONER

## 2024-07-06 PROCEDURE — 99233 SBSQ HOSP IP/OBS HIGH 50: CPT | Performed by: INTERNAL MEDICINE

## 2024-07-06 RX ADMIN — NITROGLYCERIN 30 MCG/MIN: 20 INJECTION INTRAVENOUS at 20:06

## 2024-07-06 RX ADMIN — METOPROLOL TARTRATE 50 MG: 25 TABLET, FILM COATED ORAL at 09:47

## 2024-07-06 RX ADMIN — PRAVASTATIN SODIUM 40 MG: 20 TABLET ORAL at 09:48

## 2024-07-06 RX ADMIN — HYDROXYUREA 500 MG: 500 CAPSULE ORAL at 09:47

## 2024-07-06 RX ADMIN — SENNOSIDES AND DOCUSATE SODIUM 2 TABLET: 50; 8.6 TABLET ORAL at 20:42

## 2024-07-06 RX ADMIN — PANTOPRAZOLE SODIUM 40 MG: 40 TABLET, DELAYED RELEASE ORAL at 06:53

## 2024-07-06 RX ADMIN — HYDROXYUREA 500 MG: 500 CAPSULE ORAL at 20:40

## 2024-07-06 RX ADMIN — METOPROLOL TARTRATE 50 MG: 25 TABLET, FILM COATED ORAL at 20:41

## 2024-07-06 RX ADMIN — SODIUM CHLORIDE, PRESERVATIVE FREE 10 ML: 5 INJECTION INTRAVENOUS at 10:00

## 2024-07-06 RX ADMIN — AMLODIPINE BESYLATE 10 MG: 10 TABLET ORAL at 09:47

## 2024-07-06 RX ADMIN — ASPIRIN 81 MG: 81 TABLET, COATED ORAL at 09:47

## 2024-07-06 ASSESSMENT — PAIN SCALES - GENERAL: PAINLEVEL_OUTOF10: 0

## 2024-07-06 NOTE — PROGRESS NOTES
providing face-to-face patient care, including:  and coordinating care, reviewing the chart, labs, and diagnostics, as well as medical decision making. Greater than 50% of this time was spent instructing and counseling the patient face to face regarding findings and recommendations.      Patient seen. No complaints. No further itching.  As above  Plans for OR Tuesday is cleared by all consultants    Martine Gleason DO  Cardiothoracic Surgery

## 2024-07-06 NOTE — PROGRESS NOTES
Subjective:    Chief complaint:    No further chest pain or shortness of breath  Objective:    /65   Pulse 65   Temp 98.4 °F (36.9 °C) (Temporal)   Resp 18   Ht 1.651 m (5' 5\")   Wt 96.2 kg (212 lb)   SpO2 94%   BMI 35.28 kg/m²   General : Awake ,alert,no distress.  Heart:  RRR, no murmurs, gallops, or rubs.  Lungs:  CTA bilaterally, no wheeze, rales or rhonchi  Abd: bowel sounds present, nontender, nondistended, no masses  Extrem:  No clubbing, cyanosis, or edema    CBC:   Lab Results   Component Value Date/Time    WBC 9.2 07/05/2024 07:34 AM    RBC 2.97 07/05/2024 07:34 AM    HGB 12.7 07/05/2024 07:34 AM    HCT 35.2 07/05/2024 07:34 AM    .5 07/05/2024 07:34 AM    MCH 42.8 07/05/2024 07:34 AM    MCHC 36.1 07/05/2024 07:34 AM    RDW 12.1 07/05/2024 07:34 AM     07/05/2024 07:34 AM    MPV 9.7 07/05/2024 07:34 AM     BMP:    Lab Results   Component Value Date/Time     07/04/2024 06:09 AM    K 4.1 07/04/2024 06:09 AM    CL 99 07/04/2024 06:09 AM    CO2 22 07/04/2024 06:09 AM    BUN 25 07/04/2024 06:09 AM    CREATININE 0.8 07/04/2024 06:09 AM    CALCIUM 8.9 07/04/2024 06:09 AM    LABGLOM 78 07/04/2024 06:09 AM    LABGLOM >60 12/18/2023 02:00 PM    GLUCOSE 235 07/04/2024 06:09 AM    GLUCOSE 104 11/23/2010 09:48 AM     PT/INR:  No results found for: \"PROTIME\", \"INR\"  Troponin:  No results found for: \"TROPONINI\"    No results for input(s): \"LABURIN\" in the last 72 hours.  No results for input(s): \"BC\" in the last 72 hours.  No results for input(s): \"BLOODCULT2\" in the last 72 hours.      Current Facility-Administered Medications:     metoprolol tartrate (LOPRESSOR) tablet 50 mg, 50 mg, Oral, BID, Mar Salas APRN - CNP, 50 mg at 07/05/24 2029    hydroxyurea (HYDREA) chemo capsule 500 mg, 500 mg, Oral, BID, Dru Rodriguez MD, 500 mg at 07/05/24 2029    sodium chloride flush 0.9 % injection 5-40 mL, 5-40 mL, IntraVENous, 2 times per day, Mar Salas APRN - CNP, 10 mL at 07/05/24  2029    sodium chloride flush 0.9 % injection 5-40 mL, 5-40 mL, IntraVENous, PRN, Mar Salas, APRN - CNP    0.9 % sodium chloride infusion, , IntraVENous, PRN, Mar Salas, APRN - CNP    acetaminophen (TYLENOL) tablet 650 mg, 650 mg, Oral, Q4H PRN, Mar Salas APRN - CNP    heparin (porcine) injection 4,000 Units, 4,000 Units, IntraVENous, PRN, Mar Salas, APRN - CNP    heparin (porcine) injection 2,000 Units, 2,000 Units, IntraVENous, PRN, Mar Salas, APRN - CNP    heparin 25,000 units in dextrose 5% 250 mL (premix) infusion, 5-30 Units/kg/hr, IntraVENous, Continuous, Mar Salas APRN - CNP, Last Rate: 9.6 mL/hr at 07/05/24 2101, 10 Units/kg/hr at 07/05/24 2101    sennosides-docusate sodium (SENOKOT-S) 8.6-50 MG tablet 2 tablet, 2 tablet, Oral, Nightly, Mar Salas, APRN - CNP, 2 tablet at 07/05/24 2029    perflutren lipid microspheres (DEFINITY) injection 1.5 mL, 1.5 mL, IntraVENous, ONCE PRN, Mar Salas APRN - CNP    albuterol (PROVENTIL) (2.5 MG/3ML) 0.083% nebulizer solution 2.5 mg, 2.5 mg, Nebulization, Q20 Min PRN, Mar Salas APRN - CNP    aspirin EC tablet 81 mg, 81 mg, Oral, Daily, Mar Salas, APRN - CNP, 81 mg at 07/05/24 0954    nitroGLYCERIN 200 mcg/mL in dextrose 5%, 5-200 mcg/min, IntraVENous, Continuous, Mar Salas APRN - CNP, Last Rate: 9 mL/hr at 07/05/24 2103, 30 mcg/min at 07/05/24 2103    pravastatin (PRAVACHOL) tablet 40 mg, 40 mg, Oral, Daily, Mar Salas, APRN - CNP, 40 mg at 07/05/24 0954    amLODIPine (NORVASC) tablet 10 mg, 10 mg, Oral, Daily, Mar Salas APRN - CNP, 10 mg at 07/05/24 0954    [Held by provider] metFORMIN (GLUCOPHAGE) tablet 500 mg, 500 mg, Oral, BID WC, Mar Salas APRN - CNP, 500 mg at 07/02/24 1901    pantoprazole (PROTONIX) tablet 40 mg, 40 mg, Oral, QAM AC, Mar Salas APRN - CNP, 40 mg at 07/06/24 0653    morphine (PF) injection 1 mg, 1 mg, IntraVENous, Q4H PRN, Mar Salas APRN - CNP, 1 mg

## 2024-07-06 NOTE — PROGRESS NOTES
07/05/2024 07:34 AM    .5 07/05/2024 07:34 AM    MCH 42.8 07/05/2024 07:34 AM    MCHC 36.1 07/05/2024 07:34 AM    RDW 12.1 07/05/2024 07:34 AM     07/05/2024 07:34 AM    MPV 9.7 07/05/2024 07:34 AM     No results for input(s): \"ALKPHOS\", \"ALT\", \"AST\", \"BILITOT\", \"BILIDIR\", \"LABALBU\" in the last 72 hours.    Invalid input(s): \"PROT\"  No results found for: \"MG\"  No results found for: \"PROTIME\", \"INR\"  Lab Results   Component Value Date/Time    TSH 2.429 11/23/2010 09:48 AM     No components found for: \"CHLPL\"  Lab Results   Component Value Date    TRIG 137 08/05/2019    TRIG 155 04/01/2019    TRIG 188 09/24/2018     Lab Results   Component Value Date    HDL 51 08/05/2019    HDL 25 (A) 04/01/2019    HDL 35 09/24/2018     No components found for: \"LDLCALC\"    Cardiac Tests:  ECG: A most recent resting electrocardiogram reviewed in conjunction with her evaluation demonstrates evidence of sinus rhythm with inferolateral T wave inversions consistent with that of an interim inferior infarct  Telemetry findings reviewed: sinus rhythm, no new tachy/bradyarrhythmias overnight      ASSESSMENT / PLAN: On a clinical basis, the patient remains compensated from a cardiovascular standpoint in the face of her multivessel coronary disease with ongoing medical stabilization pending surgical revascularization.  Continue careful monitoring of her clinical status will be necessary in addition to monitoring of platelet counts both in the face of her heparin administration and management of her thrombocytosis with additional management deferred to the hematology service.  As outlined, and long-term basis, appropriate lifestyle modification to achieve weight reduction will benefit diastolic cardiac performance as well as reducing risk of the development of obstructive sleep apnea in addition to long-term needs of aggressive risk factor modification of blood pressure, diabetes and serum lipids in attempt to reduce risk of future  atherosclerotic development.  Pending her revascularization, the predominance of management will be deferred to primary care and the hematology service as well as additional recommendations of the cardiothoracic surgical service.      Note: This report was completed utilizing computer voice recognition software. Every effort has been made to ensure accuracy, however; inadvertent computerized transcription errors may be present.    Regulo Carter MD  [facility]

## 2024-07-07 LAB
ERYTHROCYTE [DISTWIDTH] IN BLOOD BY AUTOMATED COUNT: 11.9 % (ref 11.5–15)
HCT VFR BLD AUTO: 34.3 % (ref 34–48)
HGB BLD-MCNC: 11.7 G/DL (ref 11.5–15.5)
MCH RBC QN AUTO: 41.1 PG (ref 26–35)
MCHC RBC AUTO-ENTMCNC: 34.1 G/DL (ref 32–34.5)
MCV RBC AUTO: 120.4 FL (ref 80–99.9)
PARTIAL THROMBOPLASTIN TIME: 64.9 SEC (ref 24.5–35.1)
PLATELET # BLD AUTO: 421 K/UL (ref 130–450)
PMV BLD AUTO: 10.1 FL (ref 7–12)
RBC # BLD AUTO: 2.85 M/UL (ref 3.5–5.5)
WBC OTHER # BLD: 7.4 K/UL (ref 4.5–11.5)

## 2024-07-07 PROCEDURE — 6370000000 HC RX 637 (ALT 250 FOR IP): Performed by: NURSE PRACTITIONER

## 2024-07-07 PROCEDURE — 2580000003 HC RX 258: Performed by: NURSE PRACTITIONER

## 2024-07-07 PROCEDURE — 85730 THROMBOPLASTIN TIME PARTIAL: CPT

## 2024-07-07 PROCEDURE — 85027 COMPLETE CBC AUTOMATED: CPT

## 2024-07-07 PROCEDURE — 2140000000 HC CCU INTERMEDIATE R&B

## 2024-07-07 PROCEDURE — 6360000002 HC RX W HCPCS: Performed by: NURSE PRACTITIONER

## 2024-07-07 PROCEDURE — 6370000000 HC RX 637 (ALT 250 FOR IP): Performed by: INTERNAL MEDICINE

## 2024-07-07 PROCEDURE — 99233 SBSQ HOSP IP/OBS HIGH 50: CPT | Performed by: INTERNAL MEDICINE

## 2024-07-07 PROCEDURE — 36415 COLL VENOUS BLD VENIPUNCTURE: CPT

## 2024-07-07 PROCEDURE — 99232 SBSQ HOSP IP/OBS MODERATE 35: CPT | Performed by: STUDENT IN AN ORGANIZED HEALTH CARE EDUCATION/TRAINING PROGRAM

## 2024-07-07 RX ADMIN — AMLODIPINE BESYLATE 10 MG: 10 TABLET ORAL at 08:38

## 2024-07-07 RX ADMIN — METOPROLOL TARTRATE 50 MG: 25 TABLET, FILM COATED ORAL at 08:38

## 2024-07-07 RX ADMIN — HYDROXYUREA 500 MG: 500 CAPSULE ORAL at 08:38

## 2024-07-07 RX ADMIN — PANTOPRAZOLE SODIUM 40 MG: 40 TABLET, DELAYED RELEASE ORAL at 05:49

## 2024-07-07 RX ADMIN — PRAVASTATIN SODIUM 40 MG: 20 TABLET ORAL at 08:38

## 2024-07-07 RX ADMIN — HYDROXYUREA 500 MG: 500 CAPSULE ORAL at 20:45

## 2024-07-07 RX ADMIN — SODIUM CHLORIDE, PRESERVATIVE FREE 10 ML: 5 INJECTION INTRAVENOUS at 20:44

## 2024-07-07 RX ADMIN — HEPARIN SODIUM 10 UNITS/KG/HR: 10000 INJECTION, SOLUTION INTRAVENOUS at 01:06

## 2024-07-07 RX ADMIN — SODIUM CHLORIDE, PRESERVATIVE FREE 10 ML: 5 INJECTION INTRAVENOUS at 08:41

## 2024-07-07 RX ADMIN — METOPROLOL TARTRATE 50 MG: 25 TABLET, FILM COATED ORAL at 20:44

## 2024-07-07 RX ADMIN — ASPIRIN 81 MG: 81 TABLET, COATED ORAL at 08:38

## 2024-07-07 ASSESSMENT — PAIN SCALES - GENERAL
PAINLEVEL_OUTOF10: 0

## 2024-07-07 NOTE — PROGRESS NOTES
CC: CP    Brief HPI:  Patient seen with Dr. Gleason. Resting quietly      Past Medical History:   Diagnosis Date    Diabetes (HCC)     Hernia     Hypertension     Obesity     PCOS (polycystic ovarian syndrome)      Past Surgical History:   Procedure Laterality Date    CARDIAC PROCEDURE N/A 7/3/2024    Left heart cath / coronary angiography performed by Tami Eldridge MD at [facility] CARDIAC CATH LAB    CARDIAC PROCEDURE N/A 7/3/2024    Fractional flow reserve (FFR) performed by Tami Eldridge MD at [facility] CARDIAC CATH LAB    CHOLECYSTECTOMY      CYST REMOVAL      ON NECK    FOOT SURGERY       Social History     Socioeconomic History    Marital status:      Spouse name: Not on file    Number of children: Not on file    Years of education: Not on file    Highest education level: Not on file   Occupational History    Not on file   Tobacco Use    Smoking status: Former     Current packs/day: 1.00     Average packs/day: 1 pack/day for 18.0 years (18.0 ttl pk-yrs)     Types: Cigarettes    Smokeless tobacco: Never   Substance and Sexual Activity    Alcohol use: Not Currently     Comment: SOCIALLY    Drug use: No    Sexual activity: Yes     Partners: Male   Other Topics Concern    Not on file   Social History Narrative    Not on file     Social Determinants of Health     Financial Resource Strain: Not on file   Food Insecurity: No Food Insecurity (7/2/2024)    Hunger Vital Sign     Worried About Running Out of Food in the Last Year: Never true     Ran Out of Food in the Last Year: Never true   Transportation Needs: No Transportation Needs (7/2/2024)    PRAPARE - Transportation     Lack of Transportation (Medical): No     Lack of Transportation (Non-Medical): No   Physical Activity: Not on file   Stress: Not on file   Social Connections: Not on file   Intimate Partner Violence: Not on file   Housing Stability: Low Risk  (7/2/2024)    Housing Stability Vital Sign     Unable to Pay for Housing in the Last Year: No     Number of

## 2024-07-07 NOTE — PROGRESS NOTES
Subjective:    Chief complaint:    Feeling okay  Daughter is by the bedside  No overnight events  Objective:    BP (!) 131/59   Pulse 69   Temp 97.5 °F (36.4 °C) (Temporal)   Resp 14   Ht 1.651 m (5' 5\")   Wt 96.2 kg (212 lb)   SpO2 95%   BMI 35.28 kg/m²   General : Awake ,alert,no distress.  Heart:  RRR, no murmurs, gallops, or rubs.  Lungs:  CTA bilaterally, no wheeze, rales or rhonchi  Abd: bowel sounds present, nontender, nondistended, no masses  Extrem:  No clubbing, cyanosis, or edema    CBC:   Lab Results   Component Value Date/Time    WBC 7.4 07/07/2024 05:24 AM    RBC 2.85 07/07/2024 05:24 AM    HGB 11.7 07/07/2024 05:24 AM    HCT 34.3 07/07/2024 05:24 AM    .4 07/07/2024 05:24 AM    MCH 41.1 07/07/2024 05:24 AM    MCHC 34.1 07/07/2024 05:24 AM    RDW 11.9 07/07/2024 05:24 AM     07/07/2024 05:24 AM    MPV 10.1 07/07/2024 05:24 AM     BMP:    Lab Results   Component Value Date/Time     07/06/2024 09:47 AM    K 4.1 07/06/2024 09:47 AM     07/06/2024 09:47 AM    CO2 22 07/06/2024 09:47 AM    BUN 27 07/06/2024 09:47 AM    CREATININE 0.9 07/06/2024 09:47 AM    CALCIUM 8.8 07/06/2024 09:47 AM    LABGLOM 73 07/06/2024 09:47 AM    LABGLOM >60 12/18/2023 02:00 PM    GLUCOSE 219 07/06/2024 09:47 AM    GLUCOSE 104 11/23/2010 09:48 AM     PT/INR:  No results found for: \"PROTIME\", \"INR\"  Troponin:  No results found for: \"TROPONINI\"    No results for input(s): \"LABURIN\" in the last 72 hours.  No results for input(s): \"BC\" in the last 72 hours.  No results for input(s): \"BLOODCULT2\" in the last 72 hours.      Current Facility-Administered Medications:     metoprolol tartrate (LOPRESSOR) tablet 50 mg, 50 mg, Oral, BID, Mar Salas APRN - CNP, 50 mg at 07/07/24 0838    hydroxyurea (HYDREA) chemo capsule 500 mg, 500 mg, Oral, BID, Dru Rodriguez MD, 500 mg at 07/07/24 0838    sodium chloride flush 0.9 % injection 5-40 mL, 5-40 mL, IntraVENous, 2 times per day, Mar aSlas APRN -

## 2024-07-07 NOTE — PLAN OF CARE
Problem: Chronic Conditions and Co-morbidities  Goal: Patient's chronic conditions and co-morbidity symptoms are monitored and maintained or improved  Outcome: Progressing  Flowsheets (Taken 7/6/2024 1956)  Care Plan - Patient's Chronic Conditions and Co-Morbidity Symptoms are Monitored and Maintained or Improved:   Monitor and assess patient's chronic conditions and comorbid symptoms for stability, deterioration, or improvement   Collaborate with multidisciplinary team to address chronic and comorbid conditions and prevent exacerbation or deterioration   Update acute care plan with appropriate goals if chronic or comorbid symptoms are exacerbated and prevent overall improvement and discharge     Problem: Safety - Adult  Goal: Free from fall injury  Outcome: Progressing     Problem: ABCDS Injury Assessment  Goal: Absence of physical injury  Outcome: Progressing     Problem: Pain  Goal: Verbalizes/displays adequate comfort level or baseline comfort level  Outcome: Progressing

## 2024-07-07 NOTE — PROGRESS NOTES
hypercholesterolemia 07/02/2024    Moderate obesity 07/02/2024    CAD in native artery 07/01/2024    Thrombocytosis 09/16/2016    Gastroesophageal reflux disease 03/18/2016    Primary hypertension 03/18/2016    Type 2 diabetes mellitus without complication, without long-term current use of insulin (HCC) 03/18/2016        Past Surgical History:   Procedure Laterality Date    CARDIAC PROCEDURE N/A 7/3/2024    Left heart cath / coronary angiography performed by Tami Eldridge MD at [facility] CARDIAC CATH LAB    CARDIAC PROCEDURE N/A 7/3/2024    Fractional flow reserve (FFR) performed by Tami Eldridge MD at [facility] CARDIAC CATH LAB    CHOLECYSTECTOMY      CYST REMOVAL      ON NECK    FOOT SURGERY         Family History  Family History   Problem Relation Age of Onset    High Blood Pressure Mother     High Blood Pressure Father     Cancer Father         SKIN CA, lymphoma    Thyroid Disease Other        Social History    TOBACCO:   reports that she has quit smoking. Her smoking use included cigarettes. She has a 18.0 pack-year smoking history. She has never used smokeless tobacco.  ETOH:   reports that she does not currently use alcohol.    Home Medications  Prior to Admission medications    Medication Sig Start Date End Date Taking? Authorizing Provider   hydroxyurea (HYDREA) 500 MG chemo capsule take 1 capsule by mouth twice a day 10/30/20   Provider, MD Gene   sertraline (ZOLOFT) 50 MG tablet take 1 tablet by mouth once daily  Patient not taking: Reported on 7/2/2024 6/26/20   Ovidio Avery MD   ALPRAZolam (XANAX) 2 MG tablet Take 1 tablet by mouth 2 times daily as needed for Anxiety for up to 30 days. 6/26/20 7/26/20  Ovidio Avery MD   metFORMIN (GLUCOPHAGE) 500 MG tablet take 1 tablet by mouth twice a day with meals 6/16/20   Ovidio Avery MD   amLODIPine (NORVASC) 10 MG tablet take 1 tablet by mouth once daily 5/27/20   Ovidio Avery MD   pravastatin (PRAVACHOL) 40 MG tablet Take 1  07/07/2024    HGB 11.7 07/07/2024    HCT 34.3 07/07/2024    .4 (H) 07/07/2024     07/07/2024    LYMPHOPCT 12 (L) 07/01/2024    RBC 2.85 (L) 07/07/2024    MCH 41.1 (H) 07/07/2024    MCHC 34.1 07/07/2024    RDW 11.9 07/07/2024    NEUTOPHILPCT 79 07/01/2024    MONOPCT 8 07/01/2024    EOSPCT 1 07/01/2024    BASOPCT 0 07/01/2024    NEUTROABS 7.36 (H) 07/01/2024    LYMPHSABS 1.13 (L) 07/01/2024    MONOSABS 0.73 07/01/2024    EOSABS 0.08 07/01/2024    BASOSABS 0.00 07/01/2024       Lab Results   Component Value Date     07/06/2024    K 4.1 07/06/2024     07/06/2024    CO2 22 07/06/2024    BUN 27 (H) 07/06/2024    CREATININE 0.9 07/06/2024    GLUCOSE 219 (H) 07/06/2024    CALCIUM 8.8 07/06/2024    BILITOT 0.5 12/18/2023    ALKPHOS 87 12/18/2023    AST 26 12/18/2023    ALT 28 12/18/2023    LABGLOM 73 07/06/2024       No results found for: \"IRON\", \"TIBC\", \"FERRITIN\"        Radiology-    Cardiac procedure    Result Date: 7/3/2024  Severe two-vessel CAD Elevated LVEDP at 17 mmHg Ejection fraction 55 to 60%    Nuclear stress test with myocardial perfusion    Result Date: 7/2/2024    Stress Combined Conclusion: Abnormal vasodilator SPECT myocardial perfusion imaging with a reversible inferior perfusion abnormality suggestive of ischemia of the right coronary distribution in addition to that of attenuation with associated regional wall motion abnormality suggestive of postischemic stunning with normal left ventricular systolic function. Based on perfusion findings in conjunction with the patient's response to vasodilator administration, an intermediate risk of ischemic events is suggested.   Perfusion Comments: The left ventricle was normal in size.  No motion artifact is present with hepatic tracer uptake adjacent to myocardial segments noted.  Moderately diminished tracer uptake is present within the basal and mid inferior segment on the post regadenoson images with minimal residually decreased tracer

## 2024-07-07 NOTE — PROGRESS NOTES
INPATIENT CARDIOLOGY FOLLOW-UP    Name: Sherie Guerrero    Age: 63 y.o.    Date of Admission: 7/1/2024  7:56 PM    Date of Service: 7/7/2024    Chief Complaint: Follow-up for coronary atherosclerosis, non-ST elevation myocardial infarction, hypertension, and aspirin allergy with ongoing desensitization, primary thrombocytosis, moderate obese    Interim History: The patient continues to remain compensated from a cardiovascular standpoint and denies any interim symptoms of chest discomfort or other ischemic equivalents.  She relates no further itching in the face of ongoing aspirin desensitization.  Most recent laboratory assessment demonstrates appropriate platelet counts with ongoing gradual reduction in the face of management of her primary thrombocytosis.      Review of Systems:   The remainder of a complete multisystem review including consitutional, central nervous, respiratory, circulatory, gastrointestinal, genitourinary, endocrinologic, hematologic, musculoskeletal and psychiatric are negative.    Problem List:  Patient Active Problem List   Diagnosis    Gastroesophageal reflux disease    Primary hypertension    Type 2 diabetes mellitus without complication, without long-term current use of insulin (HCC)    Thrombocytosis    Chest pain    Elevated troponin    Pure hypercholesterolemia    Moderate obesity    CAD (coronary artery disease)    CAD in native artery    Aspirin allergy       Allergies:  Allergies   Allergen Reactions    Sulfa Antibiotics Anaphylaxis    Asa [Aspirin]     Iodine     Mushroom Extract Complex     Pcn [Penicillins]     Shellfish-Derived Products        Current Medications:  Current Facility-Administered Medications   Medication Dose Route Frequency Provider Last Rate Last Admin    metoprolol tartrate (LOPRESSOR) tablet 50 mg  50 mg Oral BID Mar Salas APRN - CNP   50 mg at 07/06/24 2041    hydroxyurea (HYDREA) chemo capsule 500 mg  500 mg Oral BID Dru Rodriguez MD   500 mg

## 2024-07-08 ENCOUNTER — ANESTHESIA EVENT (OUTPATIENT)
Dept: OPERATING ROOM | Age: 64
End: 2024-07-08
Payer: COMMERCIAL

## 2024-07-08 PROBLEM — I24.9 ACS (ACUTE CORONARY SYNDROME) (HCC): Status: ACTIVE | Noted: 2024-07-08

## 2024-07-08 LAB
ALBUMIN SERPL-MCNC: 3.6 G/DL (ref 3.5–5.2)
ALP SERPL-CCNC: 65 U/L (ref 35–104)
ALT SERPL-CCNC: 23 U/L (ref 0–32)
ANION GAP SERPL CALCULATED.3IONS-SCNC: 12 MMOL/L (ref 7–16)
AST SERPL-CCNC: 19 U/L (ref 0–31)
BASOPHILS # BLD: 0.05 K/UL (ref 0–0.2)
BASOPHILS NFR BLD: 1 % (ref 0–2)
BILIRUB SERPL-MCNC: 0.4 MG/DL (ref 0–1.2)
BUN SERPL-MCNC: 16 MG/DL (ref 6–23)
CALCIUM SERPL-MCNC: 9.2 MG/DL (ref 8.6–10.2)
CHLORIDE SERPL-SCNC: 106 MMOL/L (ref 98–107)
CO2 SERPL-SCNC: 22 MMOL/L (ref 22–29)
CREAT SERPL-MCNC: 0.8 MG/DL (ref 0.5–1)
EOSINOPHIL # BLD: 0.05 K/UL (ref 0.05–0.5)
EOSINOPHILS RELATIVE PERCENT: 1 % (ref 0–6)
ERYTHROCYTE [DISTWIDTH] IN BLOOD BY AUTOMATED COUNT: 11.9 % (ref 11.5–15)
GFR, ESTIMATED: 83 ML/MIN/1.73M2
GLUCOSE SERPL-MCNC: 174 MG/DL (ref 74–99)
HCT VFR BLD AUTO: 35.3 % (ref 34–48)
HGB BLD-MCNC: 11.9 G/DL (ref 11.5–15.5)
INR PPP: 1.2
LYMPHOCYTES NFR BLD: 1.08 K/UL (ref 1.5–4)
LYMPHOCYTES RELATIVE PERCENT: 18 % (ref 20–42)
MCH RBC QN AUTO: 40.6 PG (ref 26–35)
MCHC RBC AUTO-ENTMCNC: 33.7 G/DL (ref 32–34.5)
MCV RBC AUTO: 120.5 FL (ref 80–99.9)
MONOCYTES NFR BLD: 0.51 K/UL (ref 0.1–0.95)
MONOCYTES NFR BLD: 9 % (ref 2–12)
NEUTROPHILS NFR BLD: 71 % (ref 43–80)
NEUTS SEG NFR BLD: 4.21 K/UL (ref 1.8–7.3)
PARTIAL THROMBOPLASTIN TIME: 46.7 SEC (ref 24.5–35.1)
PARTIAL THROMBOPLASTIN TIME: 80.1 SEC (ref 24.5–35.1)
PLATELET # BLD AUTO: 422 K/UL (ref 130–450)
PMV BLD AUTO: 10.1 FL (ref 7–12)
POTASSIUM SERPL-SCNC: 4.3 MMOL/L (ref 3.5–5)
PROT SERPL-MCNC: 6.5 G/DL (ref 6.4–8.3)
PROTHROMBIN TIME: 13 SEC (ref 9.3–12.4)
RBC # BLD AUTO: 2.93 M/UL (ref 3.5–5.5)
RBC # BLD: ABNORMAL 10*6/UL
SODIUM SERPL-SCNC: 140 MMOL/L (ref 132–146)
WBC OTHER # BLD: 5.9 K/UL (ref 4.5–11.5)

## 2024-07-08 PROCEDURE — 99233 SBSQ HOSP IP/OBS HIGH 50: CPT | Performed by: INTERNAL MEDICINE

## 2024-07-08 PROCEDURE — 99232 SBSQ HOSP IP/OBS MODERATE 35: CPT | Performed by: STUDENT IN AN ORGANIZED HEALTH CARE EDUCATION/TRAINING PROGRAM

## 2024-07-08 PROCEDURE — APPSS60 APP SPLIT SHARED TIME 46-60 MINUTES: Performed by: NURSE PRACTITIONER

## 2024-07-08 PROCEDURE — 2580000003 HC RX 258: Performed by: NURSE PRACTITIONER

## 2024-07-08 PROCEDURE — 86900 BLOOD TYPING SEROLOGIC ABO: CPT

## 2024-07-08 PROCEDURE — 6370000000 HC RX 637 (ALT 250 FOR IP): Performed by: PHYSICIAN ASSISTANT

## 2024-07-08 PROCEDURE — 86850 RBC ANTIBODY SCREEN: CPT

## 2024-07-08 PROCEDURE — 6370000000 HC RX 637 (ALT 250 FOR IP): Performed by: NURSE PRACTITIONER

## 2024-07-08 PROCEDURE — 86901 BLOOD TYPING SEROLOGIC RH(D): CPT

## 2024-07-08 PROCEDURE — 6360000002 HC RX W HCPCS: Performed by: NURSE PRACTITIONER

## 2024-07-08 PROCEDURE — 6370000000 HC RX 637 (ALT 250 FOR IP): Performed by: INTERNAL MEDICINE

## 2024-07-08 PROCEDURE — 36415 COLL VENOUS BLD VENIPUNCTURE: CPT

## 2024-07-08 PROCEDURE — 85610 PROTHROMBIN TIME: CPT

## 2024-07-08 PROCEDURE — 85025 COMPLETE CBC W/AUTO DIFF WBC: CPT

## 2024-07-08 PROCEDURE — 2140000000 HC CCU INTERMEDIATE R&B

## 2024-07-08 PROCEDURE — 85730 THROMBOPLASTIN TIME PARTIAL: CPT

## 2024-07-08 PROCEDURE — 80053 COMPREHEN METABOLIC PANEL: CPT

## 2024-07-08 PROCEDURE — 86923 COMPATIBILITY TEST ELECTRIC: CPT

## 2024-07-08 RX ORDER — CHLORHEXIDINE GLUCONATE 40 MG/ML
SOLUTION TOPICAL SEE ADMIN INSTRUCTIONS
Status: DISCONTINUED | OUTPATIENT
Start: 2024-07-08 | End: 2024-07-09

## 2024-07-08 RX ORDER — CHLORHEXIDINE GLUCONATE ORAL RINSE 1.2 MG/ML
15 SOLUTION DENTAL ONCE
Status: COMPLETED | OUTPATIENT
Start: 2024-07-09 | End: 2024-07-09

## 2024-07-08 RX ADMIN — HEPARIN SODIUM 10 UNITS/KG/HR: 10000 INJECTION, SOLUTION INTRAVENOUS at 05:36

## 2024-07-08 RX ADMIN — CHLORHEXIDINE GLUCONATE: 213 SOLUTION TOPICAL at 20:30

## 2024-07-08 RX ADMIN — PANTOPRAZOLE SODIUM 40 MG: 40 TABLET, DELAYED RELEASE ORAL at 06:01

## 2024-07-08 RX ADMIN — NITROGLYCERIN 30 MCG/MIN: 20 INJECTION INTRAVENOUS at 21:52

## 2024-07-08 RX ADMIN — SODIUM CHLORIDE, PRESERVATIVE FREE 10 ML: 5 INJECTION INTRAVENOUS at 09:03

## 2024-07-08 RX ADMIN — HEPARIN SODIUM 2000 UNITS: 1000 INJECTION INTRAVENOUS; SUBCUTANEOUS at 16:36

## 2024-07-08 RX ADMIN — MUPIROCIN 1 EACH: 20 OINTMENT TOPICAL at 21:53

## 2024-07-08 RX ADMIN — METOPROLOL TARTRATE 50 MG: 25 TABLET, FILM COATED ORAL at 09:04

## 2024-07-08 RX ADMIN — AMLODIPINE BESYLATE 10 MG: 10 TABLET ORAL at 09:03

## 2024-07-08 RX ADMIN — PRAVASTATIN SODIUM 40 MG: 20 TABLET ORAL at 20:59

## 2024-07-08 RX ADMIN — NITROGLYCERIN 30 MCG/MIN: 20 INJECTION INTRAVENOUS at 00:16

## 2024-07-08 RX ADMIN — HYDROXYUREA 500 MG: 500 CAPSULE ORAL at 09:04

## 2024-07-08 RX ADMIN — ASPIRIN 81 MG: 81 TABLET, COATED ORAL at 09:04

## 2024-07-08 RX ADMIN — METOPROLOL TARTRATE 50 MG: 25 TABLET, FILM COATED ORAL at 20:59

## 2024-07-08 ASSESSMENT — PAIN SCALES - GENERAL: PAINLEVEL_OUTOF10: 0

## 2024-07-08 NOTE — PLAN OF CARE
Problem: Chronic Conditions and Co-morbidities  Goal: Patient's chronic conditions and co-morbidity symptoms are monitored and maintained or improved  Outcome: Progressing  Flowsheets (Taken 7/7/2024 1915)  Care Plan - Patient's Chronic Conditions and Co-Morbidity Symptoms are Monitored and Maintained or Improved:   Monitor and assess patient's chronic conditions and comorbid symptoms for stability, deterioration, or improvement   Collaborate with multidisciplinary team to address chronic and comorbid conditions and prevent exacerbation or deterioration   Update acute care plan with appropriate goals if chronic or comorbid symptoms are exacerbated and prevent overall improvement and discharge     Problem: Safety - Adult  Goal: Free from fall injury  Outcome: Progressing     Problem: Pain  Goal: Verbalizes/displays adequate comfort level or baseline comfort level  Outcome: Progressing

## 2024-07-08 NOTE — PROGRESS NOTES
Number of Places Lived in the Last Year: 1     Unstable Housing in the Last Year: No     Family History   Problem Relation Age of Onset    High Blood Pressure Mother     High Blood Pressure Father     Cancer Father         SKIN CA, lymphoma    Thyroid Disease Other        Vitals:    07/07/24 2044 07/07/24 2301 07/08/24 0401 07/08/24 0638   BP: (!) 166/90 (!) 114/52 (!) 148/70    Pulse: 71 57 57    Resp:  15 14    Temp:  97.5 °F (36.4 °C) 98.2 °F (36.8 °C)    TempSrc:  Temporal Temporal    SpO2:  94% 94%    Weight:    92.5 kg (204 lb)   Height:               Intake/Output Summary (Last 24 hours) at 7/8/2024 0657  Last data filed at 7/8/2024 0536  Gross per 24 hour   Intake 562.14 ml   Output --   Net 562.14 ml         Recent Labs     07/05/24  0734 07/07/24  0524   WBC 9.2 7.4   HGB 12.7 11.7   HCT 35.2 34.3    421      Recent Labs     07/06/24  0947   BUN 27*   CREATININE 0.9         Creatinine   Date/Time Value Ref Range Status   07/06/2024 09:47 AM 0.9 0.50 - 1.00 mg/dL Final   07/04/2024 06:09 AM 0.8 0.50 - 1.00 mg/dL Final   07/01/2024 09:13 PM 0.7 0.50 - 1.00 mg/dL Final       Medication Review:    metoprolol tartrate, 50 mg, Oral, BID    hydroxyurea, 500 mg, Oral, BID    sodium chloride flush, 5-40 mL, IntraVENous, 2 times per day    sennosides-docusate sodium, 2 tablet, Oral, Nightly    aspirin, 81 mg, Oral, Daily    pravastatin, 40 mg, Oral, Daily    amLODIPine, 10 mg, Oral, Daily    [Held by provider] metFORMIN, 500 mg, Oral, BID WC    pantoprazole, 40 mg, Oral, QAM AC   sodium chloride flush, 5-40 mL, PRN  sodium chloride, , PRN  acetaminophen, 650 mg, Q4H PRN  heparin (porcine), 4,000 Units, PRN  heparin (porcine), 2,000 Units, PRN  perflutren lipid microspheres, 1.5 mL, ONCE PRN  albuterol, 2.5 mg, Q20 Min PRN  morphine, 1 mg, Q4H PRN           ROS:   Negative for CP, palpitations, SOB at rest, dizziness/lightheadedness.      Physical Exam   Constitutional: Oriented to person, place, and time.  chart, labs, and diagnostics, as well as medical decision making. Greater than 50% of this time was spent instructing and counseling the patient face to face regarding findings and recommendations.      Patient seen. No complaints.  As above  OR tomorrow for CABG x2 (LIMA-LAD, SVG-RCA vs PDA)  All risks, benefits, alternatives and potential complications explained thoroughly including, but not limited to, bleeding, infection, lung injury, kidney injury, stroke, heart attack, prolonged ventilation, wound complication, need for re-operation, and death, and the patient agrees to proceed.  Informed consent obtained, consent signed and left in the chart    Martine Gleason DO  Cardiothoracic Surgery

## 2024-07-08 NOTE — CARE COORDINATION
07/08/24 CM update:  Pt is for CABG on 07/09 Plan is home with [facility] Referral previously placed and they are following CM/SW to follow Electronically signed by Luis Frank RN CM on 7/8/2024 at 1:29 PM

## 2024-07-08 NOTE — PROGRESS NOTES
Inpatient Cardiology Progress note     PATIENT IS BEING FOLLOWED FOR: REBECCA Xiaotracey Guerrero is a 63 y.o.  female seen in initial consultation by Dr Carter.    PMH: HTN, T2DM, BMI 33, HLD, ex smoker, thrombocytosis, unremarkable EGD 3/1/2024, GERD, PCOS, and Crow.     [facility]-ED 2024 for CP. Troponin 15>20>35, p-, BMP WNL, Plt 562, Hgb 14.9, WBC 9.3. /82 HR 70-80's SR, afebrile, and O2 saturation 97% on RA. EKG SR rate 78 no ischemic changes.     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      SUBJECTIVE: Denies CP or SOB.   OBJECTIVE: No apparent distress     ROS:  Consist: Denies fevers, chills or night sweats  Heart: Denies chest pain, palpitations, lightheadedness, dizziness or syncope  Lungs: Denies SOB, cough, wheezing, orthopnea or PND  GI: Denies abdominal pain, vomiting or diarrhea    PHYSICAL EXAM:   BP (!) 146/67   Pulse 63   Temp 97.7 °F (36.5 °C) (Temporal)   Resp 13   Ht 1.651 m (5' 5\")   Wt 92.5 kg (204 lb)   SpO2 96%   BMI 33.95 kg/m²    B/P Range last 24 hours: Systolic (24hrs), Av , Min:114 , Max:166    Diastolic (24hrs), Av, Min:52, Max:90    CONST: Well developed, well nourished who appears stated age. Awake, alert and cooperative. No apparent distress  HEENT:   Head- Normocephalic, atraumatic   Eyes- Conjunctivae pink, anicteric  Throat- Oral mucosa pink and moist  Neck-  No stridor, trachea midline, no jugular venous distention. No carotid bruit  CHEST: Chest symmetrical and non-tender to palpation. No accessory muscle use or intercostal retractions  RESPIRATORY:  Lung sounds - Diminished in the bases, on RA   CARDIOVASCULAR:     Heart Inspection- shows no noted pulsations  Heart Palpation- no heaves or thrills; PMI is non-displaced   Heart Ausculation- Regular rate and rhythm, II/VI JOEY.    PV: No lower extremity edema. No varicosities. Pedal pulses palpable, no clubbing or cyanosis   ABDOMEN: Soft,

## 2024-07-08 NOTE — PROGRESS NOTES
Subjective:    Chief complaint:    No chest pain or shortness of breath  Hard of hearing  Objective:    BP (!) 149/67   Pulse 65   Temp 97.7 °F (36.5 °C) (Temporal)   Resp 15   Ht 1.651 m (5' 5\")   Wt 92.5 kg (204 lb)   SpO2 94%   BMI 33.95 kg/m²   General : Awake ,alert,no distress.  Heart:  RRR, no murmurs, gallops, or rubs.  Lungs:  CTA bilaterally, no wheeze, rales or rhonchi  Abd: bowel sounds present, nontender, nondistended, no masses  Extrem:  No clubbing, cyanosis, or edema    CBC:   Lab Results   Component Value Date/Time    WBC 5.9 07/08/2024 08:21 AM    RBC 2.93 07/08/2024 08:21 AM    HGB 11.9 07/08/2024 08:21 AM    HCT 35.3 07/08/2024 08:21 AM    .5 07/08/2024 08:21 AM    MCH 40.6 07/08/2024 08:21 AM    MCHC 33.7 07/08/2024 08:21 AM    RDW 11.9 07/08/2024 08:21 AM     07/08/2024 08:21 AM    MPV 10.1 07/08/2024 08:21 AM     BMP:    Lab Results   Component Value Date/Time     07/08/2024 05:45 AM    K 4.3 07/08/2024 05:45 AM     07/08/2024 05:45 AM    CO2 22 07/08/2024 05:45 AM    BUN 16 07/08/2024 05:45 AM    CREATININE 0.8 07/08/2024 05:45 AM    CALCIUM 9.2 07/08/2024 05:45 AM    LABGLOM 83 07/08/2024 05:45 AM    LABGLOM >60 12/18/2023 02:00 PM    GLUCOSE 174 07/08/2024 05:45 AM    GLUCOSE 104 11/23/2010 09:48 AM     PT/INR:    Lab Results   Component Value Date/Time    PROTIME 13.0 07/08/2024 05:45 AM    INR 1.2 07/08/2024 05:45 AM     Troponin:  No results found for: \"TROPONINI\"    No results for input(s): \"LABURIN\" in the last 72 hours.  No results for input(s): \"BC\" in the last 72 hours.  No results for input(s): \"BLOODCULT2\" in the last 72 hours.      Current Facility-Administered Medications:     [START ON 7/9/2024] ceFAZolin (ANCEF) 2,000 mg in sterile water 20 mL IV syringe, 2,000 mg, IntraVENous, On Call to OR, Mar Salas APRN - CNP    mupirocin (BACTROBAN) 2 % ointment, , Each Nostril, BID, Mar Salas APRN - CNP    [START ON 7/9/2024] chlorhexidine  vertebral arteries are patent with flow in the normal direction.         CT CHEST WO CONTRAST   Final Result   1. No sign of aneurysmal dilatation of the thoracic aorta.   2. Mild coronary calcification and mild calcification of the aortic valve   annulus.   3. Small nonobstructive calculus in the upper pole of the left kidney,   incompletely examined.         XR CHEST PORTABLE   Final Result   No acute cardiopulmonary disease.             Assessment:    Principal Problem:    Chest pain  Active Problems:    Primary hypertension    Type 2 diabetes mellitus without complication, without long-term current use of insulin (HCC)    Elevated troponin    Pure hypercholesterolemia    Moderate obesity    CAD (coronary artery disease)    CAD in native artery    Aspirin allergy  Resolved Problems:    * No resolved hospital problems. *      Plan:    Bypass tomorrow        Art Blue MD  1:02 PM  7/8/2024    NOTE: This report was transcribed using voice recognition software. Every effort was made to ensure accuracy; however, inadvertent transcription errors may be present

## 2024-07-09 ENCOUNTER — ANESTHESIA (OUTPATIENT)
Dept: OPERATING ROOM | Age: 64
End: 2024-07-09
Payer: COMMERCIAL

## 2024-07-09 ENCOUNTER — APPOINTMENT (OUTPATIENT)
Dept: GENERAL RADIOLOGY | Age: 64
DRG: 233 | End: 2024-07-09
Payer: COMMERCIAL

## 2024-07-09 PROBLEM — J95.89 ACUTE POSTOPERATIVE RESPIRATORY INSUFFICIENCY: Status: ACTIVE | Noted: 2024-07-09

## 2024-07-09 PROBLEM — G89.18 ACUTE POST-OPERATIVE PAIN: Status: ACTIVE | Noted: 2024-07-09

## 2024-07-09 LAB
AADO2: 217.8 MMHG
AADO2: 298.7 MMHG
ANION GAP SERPL CALCULATED.3IONS-SCNC: 12 MMOL/L (ref 7–16)
B.E.: -3.3 MMOL/L (ref -3–3)
B.E.: -4.2 MMOL/L (ref -3–3)
B.E.: -4.5 MMOL/L (ref -3–3)
BUN BLD-MCNC: 11 MG/DL (ref 6–23)
BUN BLD-MCNC: 12 MG/DL (ref 6–23)
BUN BLD-MCNC: 13 MG/DL (ref 6–23)
BUN BLD-MCNC: 14 MG/DL (ref 6–23)
BUN SERPL-MCNC: 11 MG/DL (ref 6–23)
CA-I BLD-SCNC: 1 MMOL/L (ref 1.15–1.33)
CA-I BLD-SCNC: 1.05 MMOL/L (ref 1.15–1.33)
CA-I BLD-SCNC: 1.09 MMOL/L (ref 1.15–1.33)
CA-I BLD-SCNC: 1.11 MMOL/L (ref 1.15–1.33)
CA-I BLD-SCNC: 1.12 MMOL/L (ref 1.15–1.33)
CALCIUM SERPL-MCNC: 7.7 MG/DL (ref 8.6–10.2)
CHLORIDE BLD-SCNC: 100 MMOL/L (ref 100–108)
CHLORIDE BLD-SCNC: 104 MMOL/L (ref 100–108)
CHLORIDE BLD-SCNC: 105 MMOL/L (ref 100–108)
CHLORIDE BLD-SCNC: 106 MMOL/L (ref 100–108)
CHLORIDE SERPL-SCNC: 105 MMOL/L (ref 98–107)
CO2 BLD CALC-SCNC: 21 MMOL/L (ref 22–29)
CO2 BLD CALC-SCNC: 21 MMOL/L (ref 22–29)
CO2 BLD CALC-SCNC: 22 MMOL/L (ref 22–29)
CO2 BLD CALC-SCNC: 22 MMOL/L (ref 22–29)
CO2 SERPL-SCNC: 21 MMOL/L (ref 22–29)
COHB: 0.2 % (ref 0–1.5)
COHB: 0.3 % (ref 0–1.5)
COHB: 0.3 % (ref 0–1.5)
CREAT BLD-MCNC: 0.7 MG/DL (ref 0.5–1)
CREAT BLD-MCNC: 0.8 MG/DL (ref 0.5–1)
CREAT SERPL-MCNC: 0.8 MG/DL (ref 0.5–1)
CRITICAL: ABNORMAL
DATE ANALYZED: ABNORMAL
DATE OF COLLECTION: ABNORMAL
EGFR, POC: 82 ML/MIN/1.73M2
EGFR, POC: >90 ML/MIN/1.73M2
ERYTHROCYTE [DISTWIDTH] IN BLOOD BY AUTOMATED COUNT: 11.9 % (ref 11.5–15)
FIO2: 50 %
FIO2: 60 %
GFR, ESTIMATED: 87 ML/MIN/1.73M2
GLUCOSE BLD-MCNC: 125 MG/DL (ref 74–99)
GLUCOSE BLD-MCNC: 136 MG/DL (ref 74–99)
GLUCOSE BLD-MCNC: 137 MG/DL (ref 74–99)
GLUCOSE BLD-MCNC: 154 MG/DL (ref 74–99)
GLUCOSE BLD-MCNC: 160 MG/DL (ref 74–99)
GLUCOSE BLD-MCNC: 161 MG/DL (ref 74–99)
GLUCOSE BLD-MCNC: 177 MG/DL (ref 74–99)
GLUCOSE BLD-MCNC: 179 MG/DL (ref 74–99)
GLUCOSE BLD-MCNC: 182 MG/DL (ref 74–99)
GLUCOSE BLD-MCNC: 187 MG/DL (ref 74–99)
GLUCOSE BLD-MCNC: 188 MG/DL (ref 74–99)
GLUCOSE BLD-MCNC: 192 MG/DL (ref 74–99)
GLUCOSE BLD-MCNC: 195 MG/DL (ref 74–99)
GLUCOSE BLD-MCNC: 249 MG/DL (ref 74–99)
GLUCOSE SERPL-MCNC: 194 MG/DL (ref 74–99)
HCO3 VENOUS: 22.1 MMOL/L (ref 22–26)
HCO3: 21.3 MMOL/L (ref 22–26)
HCO3: 21.5 MMOL/L (ref 22–26)
HCO3: 22.6 MMOL/L (ref 22–26)
HCT VFR BLD AUTO: 23 % (ref 37–54)
HCT VFR BLD AUTO: 24 % (ref 37–54)
HCT VFR BLD AUTO: 28.3 % (ref 34–48)
HGB BLD-MCNC: 9.8 G/DL (ref 11.5–15.5)
HHB: 3.9 % (ref 0–5)
HHB: 4.7 % (ref 0–5)
HHB: 6.3 % (ref 0–5)
INR PPP: 1.2
LAB: ABNORMAL
Lab: 1508
Lab: 1715
Lab: 1828
MAGNESIUM SERPL-MCNC: 3.3 MG/DL (ref 1.6–2.6)
MCH RBC QN AUTO: 42.4 PG (ref 26–35)
MCHC RBC AUTO-ENTMCNC: 34.6 G/DL (ref 32–34.5)
MCV RBC AUTO: 122.5 FL (ref 80–99.9)
METHB: 0.2 % (ref 0–1.5)
METHB: 0.3 % (ref 0–1.5)
METHB: 0.4 % (ref 0–1.5)
MODE: ABNORMAL
MODE: ABNORMAL
MODE: AC
NEGATIVE BASE EXCESS, ART: 1.2 MMOL/L
NEGATIVE BASE EXCESS, ART: 2.1 MMOL/L
NEGATIVE BASE EXCESS, ART: 2.6 MMOL/L
NEGATIVE BASE EXCESS, VEN: 4.7 MMOL/L
O2 SAT, VEN: 80.3 %
O2 SATURATION: 93.7 % (ref 92–98.5)
O2 SATURATION: 95.3 % (ref 92–98.5)
O2 SATURATION: 96.1 % (ref 92–98.5)
O2HB: 93 % (ref 94–97)
O2HB: 94.9 % (ref 94–97)
O2HB: 95.5 % (ref 94–97)
OPERATOR ID: 359
OPERATOR ID: 5100
OPERATOR ID: ABNORMAL
PARTIAL THROMBOPLASTIN TIME: 27 SEC (ref 24.5–35.1)
PATIENT TEMP: 37 C
PCO2 VENOUS: 48.3 MM HG
PCO2: 40.9 MMHG (ref 35–45)
PCO2: 43.1 MMHG (ref 35–45)
PCO2: 44.3 MMHG (ref 35–45)
PEEP/CPAP: 8 CMH2O
PEEP/CPAP: 8 CMH2O
PFO2: 1.34 MMHG/%
PFO2: 1.85 MMHG/%
PH BLOOD GAS: 7.32 (ref 7.35–7.45)
PH BLOOD GAS: 7.33 (ref 7.35–7.45)
PH BLOOD GAS: 7.33 (ref 7.35–7.45)
PH VENOUS: 7.27 (ref 7.35–7.45)
PLATELET # BLD AUTO: 326 K/UL (ref 130–450)
PMV BLD AUTO: 10.1 FL (ref 7–12)
PO2 VENOUS: 51.2 MM HG
PO2: 80.4 MMHG (ref 75–100)
PO2: 92.7 MMHG (ref 75–100)
PO2: 93.1 MMHG (ref 75–100)
POC ANION GAP: 10 MMOL/L (ref 7–16)
POC ANION GAP: 11 MMOL/L (ref 7–16)
POC ANION GAP: 11 MMOL/L (ref 7–16)
POC ANION GAP: 13 MMOL/L (ref 7–16)
POC HCO3: 21.2 MMOL/L (ref 22–26)
POC HCO3: 21.8 MMOL/L (ref 22–26)
POC HCO3: 22.8 MMOL/L (ref 22–26)
POC HEMOGLOBIN (CALC): 7.9 G/DL (ref 12.5–15.5)
POC HEMOGLOBIN (CALC): 8.1 G/DL (ref 12.5–15.5)
POC LACTIC ACID: 0.4 MMOL/L (ref 0.5–2.2)
POC LACTIC ACID: 0.5 MMOL/L (ref 0.5–2.2)
POC LACTIC ACID: 0.7 MMOL/L (ref 0.5–2.2)
POC LACTIC ACID: 2.6 MMOL/L (ref 0.5–2.2)
POC O2 SATURATION: 100 % (ref 92–98.5)
POC O2 SATURATION: 100 % (ref 92–98.5)
POC O2 SATURATION: 99.9 % (ref 92–98.5)
POC PCO2: 31.5 MM HG (ref 35–45)
POC PCO2: 32.3 MM HG (ref 35–45)
POC PCO2: 33.8 MM HG (ref 35–45)
POC PH: 7.44 (ref 7.35–7.45)
POC PO2: 320.1 MM HG (ref 60–80)
POC PO2: 369 MM HG (ref 60–80)
POC PO2: 432.8 MM HG (ref 60–80)
POTASSIUM BLD-SCNC: 4.4 MMOL/L (ref 3.5–5)
POTASSIUM BLD-SCNC: 5.4 MMOL/L (ref 3.5–5)
POTASSIUM BLD-SCNC: 5.4 MMOL/L (ref 3.5–5)
POTASSIUM BLD-SCNC: 5.5 MMOL/L (ref 3.5–5)
POTASSIUM SERPL-SCNC: 4.5 MMOL/L (ref 3.5–5)
POTASSIUM SERPL-SCNC: 4.6 MMOL/L (ref 3.5–5)
PROTHROMBIN TIME: 13.5 SEC (ref 9.3–12.4)
PS: 10 CMH20
RBC # BLD AUTO: 2.31 M/UL (ref 3.5–5.5)
RI(T): 2.35
RI(T): 3.71
RR MECHANICAL: 12 B/MIN
SODIUM BLD-SCNC: 134 MMOL/L (ref 132–146)
SODIUM BLD-SCNC: 136 MMOL/L (ref 132–146)
SODIUM BLD-SCNC: 137 MMOL/L (ref 132–146)
SODIUM BLD-SCNC: 139 MMOL/L (ref 132–146)
SODIUM SERPL-SCNC: 138 MMOL/L (ref 132–146)
SOURCE, BLOOD GAS: ABNORMAL
THB: 10.6 G/DL (ref 11.5–16.5)
THB: 11 G/DL (ref 11.5–16.5)
THB: 11.5 G/DL (ref 11.5–16.5)
TIME ANALYZED: 1515
TIME ANALYZED: 1724
TIME ANALYZED: 1829
VT MECHANICAL: 430 ML
WBC OTHER # BLD: 10 K/UL (ref 4.5–11.5)

## 2024-07-09 PROCEDURE — 83605 ASSAY OF LACTIC ACID: CPT

## 2024-07-09 PROCEDURE — 80048 BASIC METABOLIC PNL TOTAL CA: CPT

## 2024-07-09 PROCEDURE — 71045 X-RAY EXAM CHEST 1 VIEW: CPT

## 2024-07-09 PROCEDURE — 2720000010 HC SURG SUPPLY STERILE: Performed by: STUDENT IN AN ORGANIZED HEALTH CARE EDUCATION/TRAINING PROGRAM

## 2024-07-09 PROCEDURE — 3600000009 HC SURGERY ROBOT BASE: Performed by: STUDENT IN AN ORGANIZED HEALTH CARE EDUCATION/TRAINING PROGRAM

## 2024-07-09 PROCEDURE — 80047 BASIC METABLC PNL IONIZED CA: CPT

## 2024-07-09 PROCEDURE — 06BQ4ZZ EXCISION OF LEFT SAPHENOUS VEIN, PERCUTANEOUS ENDOSCOPIC APPROACH: ICD-10-PCS | Performed by: STUDENT IN AN ORGANIZED HEALTH CARE EDUCATION/TRAINING PROGRAM

## 2024-07-09 PROCEDURE — P9045 ALBUMIN (HUMAN), 5%, 250 ML: HCPCS

## 2024-07-09 PROCEDURE — 94002 VENT MGMT INPAT INIT DAY: CPT

## 2024-07-09 PROCEDURE — 2580000003 HC RX 258: Performed by: STUDENT IN AN ORGANIZED HEALTH CARE EDUCATION/TRAINING PROGRAM

## 2024-07-09 PROCEDURE — 2700000000 HC OXYGEN THERAPY PER DAY

## 2024-07-09 PROCEDURE — 6360000002 HC RX W HCPCS: Performed by: NURSE PRACTITIONER

## 2024-07-09 PROCEDURE — 6360000002 HC RX W HCPCS

## 2024-07-09 PROCEDURE — 33518 CABG ARTERY-VEIN TWO: CPT | Performed by: STUDENT IN AN ORGANIZED HEALTH CARE EDUCATION/TRAINING PROGRAM

## 2024-07-09 PROCEDURE — 2000000000 HC ICU R&B

## 2024-07-09 PROCEDURE — 7100000001 HC PACU RECOVERY - ADDTL 15 MIN

## 2024-07-09 PROCEDURE — 6370000000 HC RX 637 (ALT 250 FOR IP): Performed by: PHYSICIAN ASSISTANT

## 2024-07-09 PROCEDURE — 02HV33Z INSERTION OF INFUSION DEVICE INTO SUPERIOR VENA CAVA, PERCUTANEOUS APPROACH: ICD-10-PCS | Performed by: STUDENT IN AN ORGANIZED HEALTH CARE EDUCATION/TRAINING PROGRAM

## 2024-07-09 PROCEDURE — 83735 ASSAY OF MAGNESIUM: CPT

## 2024-07-09 PROCEDURE — 2580000003 HC RX 258: Performed by: NURSE PRACTITIONER

## 2024-07-09 PROCEDURE — 85730 THROMBOPLASTIN TIME PARTIAL: CPT

## 2024-07-09 PROCEDURE — 94640 AIRWAY INHALATION TREATMENT: CPT

## 2024-07-09 PROCEDURE — 82330 ASSAY OF CALCIUM: CPT

## 2024-07-09 PROCEDURE — 6370000000 HC RX 637 (ALT 250 FOR IP)

## 2024-07-09 PROCEDURE — 3600000019 HC SURGERY ROBOT ADDTL 15MIN: Performed by: STUDENT IN AN ORGANIZED HEALTH CARE EDUCATION/TRAINING PROGRAM

## 2024-07-09 PROCEDURE — 33268 EXCL LAA OPN OTH PX ANY METH: CPT | Performed by: STUDENT IN AN ORGANIZED HEALTH CARE EDUCATION/TRAINING PROGRAM

## 2024-07-09 PROCEDURE — 3700000001 HC ADD 15 MINUTES (ANESTHESIA): Performed by: STUDENT IN AN ORGANIZED HEALTH CARE EDUCATION/TRAINING PROGRAM

## 2024-07-09 PROCEDURE — 2500000003 HC RX 250 WO HCPCS

## 2024-07-09 PROCEDURE — 6370000000 HC RX 637 (ALT 250 FOR IP): Performed by: STUDENT IN AN ORGANIZED HEALTH CARE EDUCATION/TRAINING PROGRAM

## 2024-07-09 PROCEDURE — 6360000002 HC RX W HCPCS: Performed by: STUDENT IN AN ORGANIZED HEALTH CARE EDUCATION/TRAINING PROGRAM

## 2024-07-09 PROCEDURE — 85347 COAGULATION TIME ACTIVATED: CPT

## 2024-07-09 PROCEDURE — 5A1221Z PERFORMANCE OF CARDIAC OUTPUT, CONTINUOUS: ICD-10-PCS | Performed by: STUDENT IN AN ORGANIZED HEALTH CARE EDUCATION/TRAINING PROGRAM

## 2024-07-09 PROCEDURE — S2900 ROBOTIC SURGICAL SYSTEM: HCPCS | Performed by: STUDENT IN AN ORGANIZED HEALTH CARE EDUCATION/TRAINING PROGRAM

## 2024-07-09 PROCEDURE — 2500000003 HC RX 250 WO HCPCS: Performed by: STUDENT IN AN ORGANIZED HEALTH CARE EDUCATION/TRAINING PROGRAM

## 2024-07-09 PROCEDURE — 021109W BYPASS CORONARY ARTERY, TWO ARTERIES FROM AORTA WITH AUTOLOGOUS VENOUS TISSUE, OPEN APPROACH: ICD-10-PCS | Performed by: STUDENT IN AN ORGANIZED HEALTH CARE EDUCATION/TRAINING PROGRAM

## 2024-07-09 PROCEDURE — 2709999900 HC NON-CHARGEABLE SUPPLY: Performed by: STUDENT IN AN ORGANIZED HEALTH CARE EDUCATION/TRAINING PROGRAM

## 2024-07-09 PROCEDURE — 33508 ENDOSCOPIC VEIN HARVEST: CPT | Performed by: STUDENT IN AN ORGANIZED HEALTH CARE EDUCATION/TRAINING PROGRAM

## 2024-07-09 PROCEDURE — 82803 BLOOD GASES ANY COMBINATION: CPT

## 2024-07-09 PROCEDURE — 02L70CK OCCLUSION OF LEFT ATRIAL APPENDAGE WITH EXTRALUMINAL DEVICE, OPEN APPROACH: ICD-10-PCS | Performed by: STUDENT IN AN ORGANIZED HEALTH CARE EDUCATION/TRAINING PROGRAM

## 2024-07-09 PROCEDURE — 82805 BLOOD GASES W/O2 SATURATION: CPT

## 2024-07-09 PROCEDURE — 84132 ASSAY OF SERUM POTASSIUM: CPT

## 2024-07-09 PROCEDURE — 99233 SBSQ HOSP IP/OBS HIGH 50: CPT | Performed by: NURSE PRACTITIONER

## 2024-07-09 PROCEDURE — 5A1935Z RESPIRATORY VENTILATION, LESS THAN 24 CONSECUTIVE HOURS: ICD-10-PCS | Performed by: INTERNAL MEDICINE

## 2024-07-09 PROCEDURE — 85027 COMPLETE CBC AUTOMATED: CPT

## 2024-07-09 PROCEDURE — 85610 PROTHROMBIN TIME: CPT

## 2024-07-09 PROCEDURE — 33533 CABG ARTERIAL SINGLE: CPT | Performed by: STUDENT IN AN ORGANIZED HEALTH CARE EDUCATION/TRAINING PROGRAM

## 2024-07-09 PROCEDURE — 85014 HEMATOCRIT: CPT

## 2024-07-09 PROCEDURE — 2580000003 HC RX 258

## 2024-07-09 PROCEDURE — 99291 CRITICAL CARE FIRST HOUR: CPT | Performed by: INTERNAL MEDICINE

## 2024-07-09 PROCEDURE — 02100Z9 BYPASS CORONARY ARTERY, ONE ARTERY FROM LEFT INTERNAL MAMMARY, OPEN APPROACH: ICD-10-PCS | Performed by: STUDENT IN AN ORGANIZED HEALTH CARE EDUCATION/TRAINING PROGRAM

## 2024-07-09 PROCEDURE — C1729 CATH, DRAINAGE: HCPCS | Performed by: STUDENT IN AN ORGANIZED HEALTH CARE EDUCATION/TRAINING PROGRAM

## 2024-07-09 PROCEDURE — 7100000000 HC PACU RECOVERY - FIRST 15 MIN

## 2024-07-09 PROCEDURE — A4217 STERILE WATER/SALINE, 500 ML: HCPCS | Performed by: STUDENT IN AN ORGANIZED HEALTH CARE EDUCATION/TRAINING PROGRAM

## 2024-07-09 PROCEDURE — 37799 UNLISTED PX VASCULAR SURGERY: CPT

## 2024-07-09 PROCEDURE — 6370000000 HC RX 637 (ALT 250 FOR IP): Performed by: NURSE PRACTITIONER

## 2024-07-09 PROCEDURE — 82962 GLUCOSE BLOOD TEST: CPT

## 2024-07-09 PROCEDURE — C1889 IMPLANT/INSERT DEVICE, NOC: HCPCS | Performed by: STUDENT IN AN ORGANIZED HEALTH CARE EDUCATION/TRAINING PROGRAM

## 2024-07-09 PROCEDURE — C1713 ANCHOR/SCREW BN/BN,TIS/BN: HCPCS | Performed by: STUDENT IN AN ORGANIZED HEALTH CARE EDUCATION/TRAINING PROGRAM

## 2024-07-09 PROCEDURE — 3700000000 HC ANESTHESIA ATTENDED CARE: Performed by: STUDENT IN AN ORGANIZED HEALTH CARE EDUCATION/TRAINING PROGRAM

## 2024-07-09 DEVICE — PLATE BONE 1.8MM THICKNESS STRNL LCK 6 H CP TI: Type: IMPLANTABLE DEVICE | Site: STERNUM | Status: FUNCTIONAL

## 2024-07-09 DEVICE — PLATE LCK STRNL 8-H LAD T 1.8MM: Type: IMPLANTABLE DEVICE | Site: STERNUM | Status: FUNCTIONAL

## 2024-07-09 DEVICE — DEVICE OCCL CLP L35MM PLUNG GRP FLX SHFT FOR GILLINOV: Type: IMPLANTABLE DEVICE | Site: HEART | Status: FUNCTIONAL

## 2024-07-09 DEVICE — SCREW BNE L11MM DIA2.3MM THOR STRNL TI LOK DRL FREE LEV 1: Type: IMPLANTABLE DEVICE | Site: STERNUM | Status: FUNCTIONAL

## 2024-07-09 DEVICE — SCREW BNE L13MM DIA2.3MM THOR STRNL TI LOK DRL FREE LEV 1: Type: IMPLANTABLE DEVICE | Site: STERNUM | Status: FUNCTIONAL

## 2024-07-09 RX ORDER — ACETAMINOPHEN 500 MG
1000 TABLET ORAL EVERY 6 HOURS SCHEDULED
Status: DISPENSED | OUTPATIENT
Start: 2024-07-09 | End: 2024-07-12

## 2024-07-09 RX ORDER — LIDOCAINE HYDROCHLORIDE 20 MG/ML
INJECTION, SOLUTION INTRAVENOUS PRN
Status: DISCONTINUED | OUTPATIENT
Start: 2024-07-09 | End: 2024-07-09 | Stop reason: SDUPTHER

## 2024-07-09 RX ORDER — CLOPIDOGREL BISULFATE 75 MG/1
75 TABLET ORAL DAILY
Status: DISCONTINUED | OUTPATIENT
Start: 2024-07-10 | End: 2024-07-19 | Stop reason: HOSPADM

## 2024-07-09 RX ORDER — ALBUMIN, HUMAN INJ 5% 5 %
25 SOLUTION INTRAVENOUS PRN
Status: DISCONTINUED | OUTPATIENT
Start: 2024-07-09 | End: 2024-07-10

## 2024-07-09 RX ORDER — BISACODYL 10 MG
10 SUPPOSITORY, RECTAL RECTAL DAILY PRN
Status: DISCONTINUED | OUTPATIENT
Start: 2024-07-10 | End: 2024-07-19 | Stop reason: HOSPADM

## 2024-07-09 RX ORDER — GLUCAGON 1 MG/ML
1 KIT INJECTION PRN
Status: DISCONTINUED | OUTPATIENT
Start: 2024-07-09 | End: 2024-07-10

## 2024-07-09 RX ORDER — PANTOPRAZOLE SODIUM 40 MG/1
40 TABLET, DELAYED RELEASE ORAL
Status: DISCONTINUED | OUTPATIENT
Start: 2024-07-10 | End: 2024-07-19 | Stop reason: HOSPADM

## 2024-07-09 RX ORDER — PROTAMINE SULFATE 10 MG/ML
INJECTION, SOLUTION INTRAVENOUS PRN
Status: DISCONTINUED | OUTPATIENT
Start: 2024-07-09 | End: 2024-07-09 | Stop reason: SDUPTHER

## 2024-07-09 RX ORDER — POLYETHYLENE GLYCOL 3350 17 G/17G
17 POWDER, FOR SOLUTION ORAL DAILY
Status: DISCONTINUED | OUTPATIENT
Start: 2024-07-10 | End: 2024-07-19 | Stop reason: HOSPADM

## 2024-07-09 RX ORDER — ATORVASTATIN CALCIUM 40 MG/1
40 TABLET, FILM COATED ORAL NIGHTLY
Status: DISCONTINUED | OUTPATIENT
Start: 2024-07-09 | End: 2024-07-19 | Stop reason: HOSPADM

## 2024-07-09 RX ORDER — ONDANSETRON 2 MG/ML
4 INJECTION INTRAMUSCULAR; INTRAVENOUS EVERY 6 HOURS PRN
Status: DISCONTINUED | OUTPATIENT
Start: 2024-07-09 | End: 2024-07-10

## 2024-07-09 RX ORDER — LIDOCAINE 4 G/G
1 PATCH TOPICAL DAILY
Status: DISCONTINUED | OUTPATIENT
Start: 2024-07-09 | End: 2024-07-19 | Stop reason: HOSPADM

## 2024-07-09 RX ORDER — LANOLIN ALCOHOL/MO/W.PET/CERES
400 CREAM (GRAM) TOPICAL DAILY
Status: DISCONTINUED | OUTPATIENT
Start: 2024-07-10 | End: 2024-07-19 | Stop reason: HOSPADM

## 2024-07-09 RX ORDER — ACETAMINOPHEN 325 MG/1
650 TABLET ORAL EVERY 4 HOURS PRN
Status: DISCONTINUED | OUTPATIENT
Start: 2024-07-12 | End: 2024-07-10

## 2024-07-09 RX ORDER — INSULIN LISPRO 100 [IU]/ML
0-4 INJECTION, SOLUTION INTRAVENOUS; SUBCUTANEOUS EVERY 4 HOURS
Status: DISCONTINUED | OUTPATIENT
Start: 2024-07-09 | End: 2024-07-10

## 2024-07-09 RX ORDER — SODIUM CHLORIDE, SODIUM LACTATE, POTASSIUM CHLORIDE, CALCIUM CHLORIDE 600; 310; 30; 20 MG/100ML; MG/100ML; MG/100ML; MG/100ML
INJECTION, SOLUTION INTRAVENOUS CONTINUOUS PRN
Status: DISCONTINUED | OUTPATIENT
Start: 2024-07-09 | End: 2024-07-09 | Stop reason: SDUPTHER

## 2024-07-09 RX ORDER — MEPERIDINE HYDROCHLORIDE 25 MG/ML
25 INJECTION INTRAMUSCULAR; INTRAVENOUS; SUBCUTANEOUS
Status: DISCONTINUED | OUTPATIENT
Start: 2024-07-09 | End: 2024-07-10

## 2024-07-09 RX ORDER — ONDANSETRON 4 MG/1
4 TABLET, ORALLY DISINTEGRATING ORAL EVERY 8 HOURS PRN
Status: DISCONTINUED | OUTPATIENT
Start: 2024-07-09 | End: 2024-07-10

## 2024-07-09 RX ORDER — NOREPINEPHRINE BITARTRATE 0.06 MG/ML
1-100 INJECTION, SOLUTION INTRAVENOUS CONTINUOUS PRN
Status: DISCONTINUED | OUTPATIENT
Start: 2024-07-09 | End: 2024-07-10

## 2024-07-09 RX ORDER — 0.9 % SODIUM CHLORIDE 0.9 %
250 INTRAVENOUS SOLUTION INTRAVENOUS CONTINUOUS PRN
Status: DISCONTINUED | OUTPATIENT
Start: 2024-07-09 | End: 2024-07-10

## 2024-07-09 RX ORDER — MAGNESIUM SULFATE IN WATER 40 MG/ML
2000 INJECTION, SOLUTION INTRAVENOUS PRN
Status: DISCONTINUED | OUTPATIENT
Start: 2024-07-09 | End: 2024-07-10

## 2024-07-09 RX ORDER — EPINEPHRINE 1 MG/ML
INJECTION, SOLUTION, CONCENTRATE INTRAVENOUS PRN
Status: DISCONTINUED | OUTPATIENT
Start: 2024-07-09 | End: 2024-07-09 | Stop reason: SDUPTHER

## 2024-07-09 RX ORDER — HEPARIN SODIUM 10000 [USP'U]/ML
INJECTION, SOLUTION INTRAVENOUS; SUBCUTANEOUS PRN
Status: DISCONTINUED | OUTPATIENT
Start: 2024-07-09 | End: 2024-07-09 | Stop reason: SDUPTHER

## 2024-07-09 RX ORDER — MIDAZOLAM HYDROCHLORIDE 1 MG/ML
INJECTION INTRAMUSCULAR; INTRAVENOUS PRN
Status: DISCONTINUED | OUTPATIENT
Start: 2024-07-09 | End: 2024-07-09 | Stop reason: SDUPTHER

## 2024-07-09 RX ORDER — OXYCODONE HYDROCHLORIDE 10 MG/1
10 TABLET ORAL EVERY 4 HOURS PRN
Status: DISCONTINUED | OUTPATIENT
Start: 2024-07-09 | End: 2024-07-19 | Stop reason: HOSPADM

## 2024-07-09 RX ORDER — VECURONIUM BROMIDE 1 MG/ML
INJECTION, POWDER, LYOPHILIZED, FOR SOLUTION INTRAVENOUS PRN
Status: DISCONTINUED | OUTPATIENT
Start: 2024-07-09 | End: 2024-07-09 | Stop reason: SDUPTHER

## 2024-07-09 RX ORDER — FENTANYL CITRATE 0.05 MG/ML
INJECTION, SOLUTION INTRAMUSCULAR; INTRAVENOUS PRN
Status: DISCONTINUED | OUTPATIENT
Start: 2024-07-09 | End: 2024-07-09 | Stop reason: SDUPTHER

## 2024-07-09 RX ORDER — INSULIN GLARGINE 100 [IU]/ML
0.15 INJECTION, SOLUTION SUBCUTANEOUS NIGHTLY
Status: DISCONTINUED | OUTPATIENT
Start: 2024-07-10 | End: 2024-07-10

## 2024-07-09 RX ORDER — MIDAZOLAM HYDROCHLORIDE 1 MG/ML
INJECTION INTRAMUSCULAR; INTRAVENOUS
Status: COMPLETED
Start: 2024-07-09 | End: 2024-07-09

## 2024-07-09 RX ORDER — BISACODYL 10 MG
10 SUPPOSITORY, RECTAL RECTAL DAILY
Status: DISCONTINUED | OUTPATIENT
Start: 2024-07-12 | End: 2024-07-19 | Stop reason: HOSPADM

## 2024-07-09 RX ORDER — CHLORHEXIDINE GLUCONATE ORAL RINSE 1.2 MG/ML
15 SOLUTION DENTAL 2 TIMES DAILY
Status: DISCONTINUED | OUTPATIENT
Start: 2024-07-09 | End: 2024-07-09 | Stop reason: ALTCHOICE

## 2024-07-09 RX ORDER — ASPIRIN 81 MG/1
81 TABLET ORAL DAILY
Status: DISCONTINUED | OUTPATIENT
Start: 2024-07-10 | End: 2024-07-10

## 2024-07-09 RX ORDER — ASPIRIN 81 MG/1
81 TABLET, CHEWABLE ORAL ONCE
Status: COMPLETED | OUTPATIENT
Start: 2024-07-09 | End: 2024-07-09

## 2024-07-09 RX ORDER — DEXTROSE MONOHYDRATE 100 MG/ML
INJECTION, SOLUTION INTRAVENOUS CONTINUOUS PRN
Status: DISCONTINUED | OUTPATIENT
Start: 2024-07-09 | End: 2024-07-10

## 2024-07-09 RX ORDER — LACTULOSE 10 G/15ML
20 SOLUTION ORAL 3 TIMES DAILY
Status: DISPENSED | OUTPATIENT
Start: 2024-07-12 | End: 2024-07-12

## 2024-07-09 RX ORDER — IPRATROPIUM BROMIDE AND ALBUTEROL SULFATE 2.5; .5 MG/3ML; MG/3ML
1 SOLUTION RESPIRATORY (INHALATION)
Status: DISCONTINUED | OUTPATIENT
Start: 2024-07-09 | End: 2024-07-11

## 2024-07-09 RX ORDER — PROPOFOL 10 MG/ML
INJECTION, EMULSION INTRAVENOUS PRN
Status: DISCONTINUED | OUTPATIENT
Start: 2024-07-09 | End: 2024-07-09 | Stop reason: SDUPTHER

## 2024-07-09 RX ORDER — POTASSIUM CHLORIDE 29.8 MG/ML
20 INJECTION INTRAVENOUS PRN
Status: DISCONTINUED | OUTPATIENT
Start: 2024-07-09 | End: 2024-07-10

## 2024-07-09 RX ORDER — AMINOCAPROIC ACID 250 MG/ML
INJECTION, SOLUTION INTRAVENOUS PRN
Status: DISCONTINUED | OUTPATIENT
Start: 2024-07-09 | End: 2024-07-09 | Stop reason: SDUPTHER

## 2024-07-09 RX ORDER — SODIUM CHLORIDE 9 MG/ML
INJECTION, SOLUTION INTRAVENOUS PRN
Status: DISCONTINUED | OUTPATIENT
Start: 2024-07-09 | End: 2024-07-10

## 2024-07-09 RX ORDER — SENNA AND DOCUSATE SODIUM 50; 8.6 MG/1; MG/1
1 TABLET, FILM COATED ORAL 2 TIMES DAILY
Status: DISCONTINUED | OUTPATIENT
Start: 2024-07-10 | End: 2024-07-10 | Stop reason: SDUPTHER

## 2024-07-09 RX ORDER — PROPOFOL 10 MG/ML
INJECTION, EMULSION INTRAVENOUS
Status: COMPLETED
Start: 2024-07-09 | End: 2024-07-09

## 2024-07-09 RX ORDER — ACETAMINOPHEN 650 MG/1
650 SUPPOSITORY RECTAL EVERY 4 HOURS PRN
Status: DISCONTINUED | OUTPATIENT
Start: 2024-07-09 | End: 2024-07-10

## 2024-07-09 RX ORDER — SODIUM CHLORIDE 0.9 % (FLUSH) 0.9 %
5-40 SYRINGE (ML) INJECTION PRN
Status: DISCONTINUED | OUTPATIENT
Start: 2024-07-09 | End: 2024-07-19 | Stop reason: HOSPADM

## 2024-07-09 RX ORDER — AMIODARONE HYDROCHLORIDE 200 MG/1
400 TABLET ORAL PRN
Status: DISCONTINUED | OUTPATIENT
Start: 2024-07-09 | End: 2024-07-10

## 2024-07-09 RX ORDER — PROPOFOL 10 MG/ML
5-50 INJECTION, EMULSION INTRAVENOUS CONTINUOUS PRN
Status: DISCONTINUED | OUTPATIENT
Start: 2024-07-09 | End: 2024-07-09 | Stop reason: ALTCHOICE

## 2024-07-09 RX ORDER — ALBUMIN, HUMAN INJ 5% 5 %
SOLUTION INTRAVENOUS
Status: COMPLETED
Start: 2024-07-09 | End: 2024-07-09

## 2024-07-09 RX ORDER — OXYCODONE HYDROCHLORIDE 5 MG/1
5 TABLET ORAL EVERY 4 HOURS PRN
Status: DISCONTINUED | OUTPATIENT
Start: 2024-07-09 | End: 2024-07-19 | Stop reason: HOSPADM

## 2024-07-09 RX ORDER — SODIUM CHLORIDE 9 MG/ML
INJECTION, SOLUTION INTRAVENOUS CONTINUOUS
Status: DISCONTINUED | OUTPATIENT
Start: 2024-07-09 | End: 2024-07-10

## 2024-07-09 RX ORDER — SODIUM CHLORIDE 0.9 % (FLUSH) 0.9 %
5-40 SYRINGE (ML) INJECTION EVERY 12 HOURS SCHEDULED
Status: DISCONTINUED | OUTPATIENT
Start: 2024-07-09 | End: 2024-07-19 | Stop reason: HOSPADM

## 2024-07-09 RX ADMIN — PROPOFOL 120 MG: 10 INJECTION, EMULSION INTRAVENOUS at 10:06

## 2024-07-09 RX ADMIN — ALBUMIN (HUMAN) 12.5 G: 12.5 INJECTION, SOLUTION INTRAVENOUS at 15:00

## 2024-07-09 RX ADMIN — CEFAZOLIN 2000 MG: 2 INJECTION, POWDER, FOR SOLUTION INTRAMUSCULAR; INTRAVENOUS at 14:22

## 2024-07-09 RX ADMIN — FENTANYL CITRATE 100 MCG: 50 INJECTION, SOLUTION INTRAMUSCULAR; INTRAVENOUS at 10:35

## 2024-07-09 RX ADMIN — 0.12% CHLORHEXIDINE GLUCONATE 15 ML: 1.2 RINSE ORAL at 05:01

## 2024-07-09 RX ADMIN — HYDROMORPHONE HYDROCHLORIDE 0.25 MG: 1 INJECTION, SOLUTION INTRAMUSCULAR; INTRAVENOUS; SUBCUTANEOUS at 21:08

## 2024-07-09 RX ADMIN — AMINOCAPROIC ACID 0.08 G/HR: 250 INJECTION, SOLUTION INTRAVENOUS at 10:26

## 2024-07-09 RX ADMIN — SODIUM CHLORIDE, SODIUM LACTATE, POTASSIUM CHLORIDE, CALCIUM CHLORIDE: 600; 310; 30; 20 INJECTION, SOLUTION INTRAVENOUS at 10:26

## 2024-07-09 RX ADMIN — VECURONIUM BROMIDE 10 MG: 1 INJECTION, POWDER, LYOPHILIZED, FOR SOLUTION INTRAVENOUS at 11:56

## 2024-07-09 RX ADMIN — FENTANYL CITRATE 250 MCG: 50 INJECTION, SOLUTION INTRAMUSCULAR; INTRAVENOUS at 10:46

## 2024-07-09 RX ADMIN — CALCIUM CHLORIDE INJECTION 1000 MG: 100 INJECTION, SOLUTION INTRAVENOUS at 20:09

## 2024-07-09 RX ADMIN — VECURONIUM BROMIDE 10 MG: 1 INJECTION, POWDER, LYOPHILIZED, FOR SOLUTION INTRAVENOUS at 10:06

## 2024-07-09 RX ADMIN — SODIUM NITROPRUSSIDE 0.1 MCG/KG/MIN: 50 INJECTION, SOLUTION INTRAVENOUS at 22:39

## 2024-07-09 RX ADMIN — MUPIROCIN: 20 OINTMENT TOPICAL at 21:08

## 2024-07-09 RX ADMIN — PROPOFOL 30 MG: 10 INJECTION, EMULSION INTRAVENOUS at 11:56

## 2024-07-09 RX ADMIN — ACETAMINOPHEN 1000 MG: 500 TABLET ORAL at 16:31

## 2024-07-09 RX ADMIN — METOPROLOL TARTRATE 25 MG: 25 TABLET, FILM COATED ORAL at 03:52

## 2024-07-09 RX ADMIN — IPRATROPIUM BROMIDE AND ALBUTEROL SULFATE 1 DOSE: 2.5; .5 SOLUTION RESPIRATORY (INHALATION) at 16:00

## 2024-07-09 RX ADMIN — CEFAZOLIN 2000 MG: 2 INJECTION, POWDER, FOR SOLUTION INTRAMUSCULAR; INTRAVENOUS at 10:28

## 2024-07-09 RX ADMIN — WATER 2000 MG: 1 INJECTION INTRAMUSCULAR; INTRAVENOUS; SUBCUTANEOUS at 21:59

## 2024-07-09 RX ADMIN — FENTANYL CITRATE 150 MCG: 50 INJECTION, SOLUTION INTRAMUSCULAR; INTRAVENOUS at 10:42

## 2024-07-09 RX ADMIN — SODIUM CHLORIDE 2.7 UNITS/HR: 9 INJECTION, SOLUTION INTRAVENOUS at 15:31

## 2024-07-09 RX ADMIN — PHENYLEPHRINE HYDROCHLORIDE 100 MCG: 10 INJECTION INTRAVENOUS at 14:37

## 2024-07-09 RX ADMIN — FENTANYL CITRATE 100 MCG: 50 INJECTION, SOLUTION INTRAMUSCULAR; INTRAVENOUS at 10:04

## 2024-07-09 RX ADMIN — PROTAMINE SULFATE 350 MG: 10 INJECTION, SOLUTION INTRAVENOUS at 13:48

## 2024-07-09 RX ADMIN — FENTANYL CITRATE 150 MCG: 50 INJECTION, SOLUTION INTRAMUSCULAR; INTRAVENOUS at 10:06

## 2024-07-09 RX ADMIN — PROPOFOL 30 MG: 10 INJECTION, EMULSION INTRAVENOUS at 12:01

## 2024-07-09 RX ADMIN — EPINEPHRINE 2 MCG: 1 INJECTION, SOLUTION INTRAMUSCULAR; SUBCUTANEOUS at 13:46

## 2024-07-09 RX ADMIN — PROPOFOL 20 MCG/KG/MIN: 10 INJECTION, EMULSION INTRAVENOUS at 15:00

## 2024-07-09 RX ADMIN — ALBUMIN, HUMAN INJ 5% 12.5 G: 5 SOLUTION at 15:00

## 2024-07-09 RX ADMIN — FENTANYL CITRATE 100 MCG: 50 INJECTION, SOLUTION INTRAMUSCULAR; INTRAVENOUS at 11:56

## 2024-07-09 RX ADMIN — MIDAZOLAM 3 MG: 1 INJECTION INTRAMUSCULAR; INTRAVENOUS at 09:50

## 2024-07-09 RX ADMIN — HEPARIN SODIUM 40000 UNITS: 10000 INJECTION INTRAVENOUS; SUBCUTANEOUS at 11:49

## 2024-07-09 RX ADMIN — IPRATROPIUM BROMIDE AND ALBUTEROL SULFATE 1 DOSE: 2.5; .5 SOLUTION RESPIRATORY (INHALATION) at 20:53

## 2024-07-09 RX ADMIN — PHENYLEPHRINE HYDROCHLORIDE 200 MCG: 10 INJECTION INTRAVENOUS at 10:08

## 2024-07-09 RX ADMIN — FENTANYL CITRATE 100 MCG: 50 INJECTION, SOLUTION INTRAMUSCULAR; INTRAVENOUS at 11:33

## 2024-07-09 RX ADMIN — SUGAMMADEX 200 MG: 100 INJECTION, SOLUTION INTRAVENOUS at 15:05

## 2024-07-09 RX ADMIN — SODIUM CHLORIDE, POTASSIUM CHLORIDE, SODIUM LACTATE AND CALCIUM CHLORIDE: 600; 310; 30; 20 INJECTION, SOLUTION INTRAVENOUS at 09:45

## 2024-07-09 RX ADMIN — ASPIRIN 81 MG 81 MG: 81 TABLET ORAL at 16:32

## 2024-07-09 RX ADMIN — FENTANYL CITRATE 50 MCG: 50 INJECTION, SOLUTION INTRAMUSCULAR; INTRAVENOUS at 12:01

## 2024-07-09 RX ADMIN — PANTOPRAZOLE SODIUM 40 MG: 40 INJECTION, POWDER, FOR SOLUTION INTRAVENOUS at 16:22

## 2024-07-09 RX ADMIN — INSULIN HUMAN 6 UNITS: 100 INJECTION, SOLUTION PARENTERAL at 13:46

## 2024-07-09 RX ADMIN — HYDROMORPHONE HYDROCHLORIDE 0.25 MG: 1 INJECTION, SOLUTION INTRAMUSCULAR; INTRAVENOUS; SUBCUTANEOUS at 17:51

## 2024-07-09 RX ADMIN — MIDAZOLAM 1 MG: 1 INJECTION INTRAMUSCULAR; INTRAVENOUS at 08:08

## 2024-07-09 RX ADMIN — SODIUM CHLORIDE: 9 INJECTION, SOLUTION INTRAVENOUS at 15:00

## 2024-07-09 RX ADMIN — SODIUM CHLORIDE, SODIUM LACTATE, POTASSIUM CHLORIDE, CALCIUM CHLORIDE: 600; 310; 30; 20 INJECTION, SOLUTION INTRAVENOUS at 14:43

## 2024-07-09 RX ADMIN — SODIUM CHLORIDE, PRESERVATIVE FREE 10 ML: 5 INJECTION INTRAVENOUS at 21:08

## 2024-07-09 RX ADMIN — LIDOCAINE HYDROCHLORIDE 100 MG: 20 INJECTION, SOLUTION INTRAVENOUS at 10:06

## 2024-07-09 RX ADMIN — AMINOCAPROIC ACID 5000 MG: 250 INJECTION, SOLUTION INTRAVENOUS at 10:12

## 2024-07-09 ASSESSMENT — PULMONARY FUNCTION TESTS
PIF_VALUE: 24
PIF_VALUE: 24
PIF_VALUE: 18
PIF_VALUE: 22
PIF_VALUE: 19
PIF_VALUE: 18
PIF_VALUE: 23
PIF_VALUE: 18
PIF_VALUE: 23
PIF_VALUE: 18

## 2024-07-09 ASSESSMENT — PAIN DESCRIPTION - DESCRIPTORS
DESCRIPTORS: ACHING;DULL
DESCRIPTORS: DISCOMFORT

## 2024-07-09 ASSESSMENT — PAIN - FUNCTIONAL ASSESSMENT: PAIN_FUNCTIONAL_ASSESSMENT: PREVENTS OR INTERFERES SOME ACTIVE ACTIVITIES AND ADLS

## 2024-07-09 ASSESSMENT — PAIN SCALES - GENERAL
PAINLEVEL_OUTOF10: 0
PAINLEVEL_OUTOF10: 6
PAINLEVEL_OUTOF10: 4
PAINLEVEL_OUTOF10: 4
PAINLEVEL_OUTOF10: 0

## 2024-07-09 ASSESSMENT — PAIN DESCRIPTION - LOCATION
LOCATION: STERNUM
LOCATION: INCISION

## 2024-07-09 ASSESSMENT — LIFESTYLE VARIABLES: SMOKING_STATUS: 0

## 2024-07-09 ASSESSMENT — PAIN DESCRIPTION - ORIENTATION: ORIENTATION: MID

## 2024-07-09 NOTE — PROGRESS NOTES
Patient arrived to the cardiovascular ICU from OR with hernandez catheter intact and patent. Securing device applied. Hernandez bag is hanging below the level of the bladder, safety clip attached to the bed sheet, tamper seal is intact, drainage bag is not on the floor, lack of dependent loop in tubing, and the drainage bag is less than half full.

## 2024-07-09 NOTE — PLAN OF CARE
Problem: Chronic Conditions and Co-morbidities  Goal: Patient's chronic conditions and co-morbidity symptoms are monitored and maintained or improved  Outcome: Progressing  Flowsheets (Taken 7/8/2024 1930)  Care Plan - Patient's Chronic Conditions and Co-Morbidity Symptoms are Monitored and Maintained or Improved:   Collaborate with multidisciplinary team to address chronic and comorbid conditions and prevent exacerbation or deterioration   Monitor and assess patient's chronic conditions and comorbid symptoms for stability, deterioration, or improvement   Update acute care plan with appropriate goals if chronic or comorbid symptoms are exacerbated and prevent overall improvement and discharge     Problem: Safety - Adult  Goal: Free from fall injury  Outcome: Progressing     Problem: Pain  Goal: Verbalizes/displays adequate comfort level or baseline comfort level  Outcome: Progressing     Problem: Metabolic/Fluid and Electrolytes - Adult  Goal: Electrolytes maintained within normal limits  Recent Flowsheet Documentation  Taken 7/8/2024 1930 by Rohini Peña, RN  Electrolytes maintained within normal limits:   Monitor labs and assess patient for signs and symptoms of electrolyte imbalances   Administer electrolyte replacement as ordered   Monitor response to electrolyte replacements, including repeat lab results as appropriate

## 2024-07-09 NOTE — PROGRESS NOTES
Transport here for Patient sister Sisi in room and will go with her to waiting area Patient and sister concerned RE: Patient is Basically Deaf without Hearing aid Patient has Hearing aid in while awaiting Surgery sister will take it before she Leaves

## 2024-07-09 NOTE — PROGRESS NOTES
4 Eyes Skin Assessment     NAME:  Sherie Guerrero  YOB: 1960  MEDICAL RECORD NUMBER:  [id number]    The patient is being assessed for  Post-Op Surgical    I agree that at least one RN has performed a thorough Head to Toe Skin Assessment on the patient. ALL assessment sites listed below have been assessed.      Areas assessed by both nurses:    Head, Face, Ears, Shoulders, Back, Chest, Arms, Elbows, Hands, Sacrum. Buttock, Coccyx, Ischium, Legs. Feet and Heels, and Under Medical Devices         Does the Patient have a Wound? Yes wound(s) were present on assessment. LDA wound assessment was Initiated and completed by RN       Joe Prevention initiated by RN: Yes  Wound Care Orders initiated by RN: No    Pressure Injury (Stage 3,4, Unstageable, DTI, NWPT, and Complex wounds) if present, place Wound referral order by RN under : No    New Ostomies, if present place, Ostomy referral order under : No     Nurse 1 eSignature: Electronically signed by Cindy Mallory RN on 7/9/24 at 4:20 PM EDT    **SHARE this note so that the co-signing nurse can place an eSignature**    Nurse 2 eSignature: Electronically signed by Sharri Womack RN on 7/9/24 at 6:41 PM EDT   2

## 2024-07-09 NOTE — PROGRESS NOTES
FBS obtained via Finger stick before supplement given was 249.   Patient states Diabetic so will get 1/2 of bottle of clear Carbohydrate  Drink.

## 2024-07-09 NOTE — PROGRESS NOTES
The pt was suctioned prior to extubation.  The pt was extubated to a 6 lpm nasal cannula with no stridor.

## 2024-07-09 NOTE — ANESTHESIA PRE PROCEDURE
Department of Anesthesiology  Preprocedure Note       Name:  Sherie Guerrero   Age:  63 y.o.  :  1960                                          MRN:  [id number]         Date:  2024      Surgeon: Surgeon(s):  Martine Gleason DO    Procedure: Procedure(s):  Coronary Artery Bypass Grafting    Medications prior to admission:   Prior to Admission medications    Medication Sig Start Date End Date Taking? Authorizing Provider   hydroxyurea (HYDREA) 500 MG chemo capsule take 1 capsule by mouth twice a day 10/30/20   Provider, MD Gene   sertraline (ZOLOFT) 50 MG tablet take 1 tablet by mouth once daily  Patient not taking: Reported on 2024   Ovidio Avery MD   ALPRAZolam (XANAX) 2 MG tablet Take 1 tablet by mouth 2 times daily as needed for Anxiety for up to 30 days. 20  Ovidio Avery MD   metFORMIN (GLUCOPHAGE) 500 MG tablet take 1 tablet by mouth twice a day with meals 20   Ovidio Avery MD   amLODIPine (NORVASC) 10 MG tablet take 1 tablet by mouth once daily 20   Ovidio Avery MD   pravastatin (PRAVACHOL) 40 MG tablet Take 1 tablet by mouth daily 3/27/20   Ovidio Avery MD   triamcinolone (KENALOG) 0.1 % cream Apply topically to affected areas 2 times daily. 19   Ovidio Avery MD   omeprazole (PRILOSEC) 40 MG delayed release capsule Take 1 capsule by mouth daily 19   Ovidio Avery MD   metoprolol tartrate (LOPRESSOR) 50 MG tablet Take 1 tablet by mouth 2 times daily 19   Ovidio Avery MD   EPINEPHrine (EPIPEN 2-EMIGDIO) 0.3 MG/0.3ML SOAJ injection Inject 0.3 mLs into the muscle once for 1 dose Use as directed for allergic reaction 19  Ovidio Avery MD   blood glucose monitor strips 1 strip by Other route 2 times daily 19   Ovidio Avery MD   Lancets MISC 1 each by Does not apply route 2 times daily 19   Ovidio Avery MD   Cholecalciferol

## 2024-07-09 NOTE — PROGRESS NOTES
Patient received 2nd bath with CHB wipes.  Heparin was stopped at 0400 as ordered given 1/2 bottle Clear carbohydrate drink and Lopressor as ordered.  Patient up brushing Teeth will get Peridex when done.

## 2024-07-09 NOTE — PROGRESS NOTES
Patient's Sister here taking all her belongings home with her Phone and , Hearing aid and Hearing aid Batteries,  Hair Brush and Shampoo

## 2024-07-09 NOTE — PROGRESS NOTES
CVICU Admission Note    Name: Sherie Guerrero  MRN: [id number]    CC: Postoperative Critical Care Management     Indication for Surgery/Procedure: MVCAD,   LVEF:  60%    RVF:  Normal     Important/Relevant PMH/PSH: HTN, HPL, DM, essential thrombocytopenia, GERD, arthritis, PCOS, former smoker     Procedure/Surgeries: 7/9/2024 urgent sternotomy, urgent CABG x3 (LIMA-LAD, SVG-RCA, SVG-diag), EVH, left atrial appendage ligation with 35mm Atriclip     Pacing wires: Ventricular       Physical Exam:    BP (!) 101/43   Pulse 70   Temp 97.7 °F (36.5 °C) (Bladder)   Resp 12   Ht 1.651 m (5' 5\")   Wt 92.6 kg (204 lb 3.2 oz)   SpO2 97%   BMI 33.98 kg/m²     Recent Labs     07/08/24  0821   WBC 5.9   RBC 2.93*   HGB 11.9   HCT 35.3   .5*   MCH 40.6*   MCHC 33.7   RDW 11.9      MPV 10.1     Recent Labs     07/08/24  0545 07/09/24  1229 07/09/24  1417     --   --    K 4.3  --   --      --   --    CO2 22  --   --    BUN 16  --   --    CREATININE 0.8   < > 0.7   GLUCOSE 174*  --   --    CALCIUM 9.2  --   --     < > = values in this interval not displayed.         Post operative CXR:      Atelectasis, No pneumothorax noted, Mildly increased vascular markings bilateral, No significant pleural effusion. ETT/lines/drains appear to be in proper position.  Final Radiology report pending.       General Appearance: Arrived to ICU intubated, sedated. Hemodynamically stable, no gtts.   Eyes: PERRL  Pulmonary: Diminished bibasilar.  No wheezes, no accessory muscle use noted   Ventilator: Mode: AC/VC, 40% FiO2, 8 PEEP, 450 Vt   Cardiovascular: RRR, no heaves or thrills palpated   Telemetry: SR 70s  Abdomen: Soft, OG to LIWS  Extremities:  Palpable pulses all extremities, no edema   Neurologic/Psych: Sedation  Skin: Warm and dry    Incision: MSI TONY intact, LLE SVG sites with ace wrap       Assessment/Plan: Day of Surgery     1. MVCAD S/p urgent sternotomy, urgent CABG x3 (LIMA-LAD, SVG-RCA, SVG-diag), EVH, left

## 2024-07-09 NOTE — PROGRESS NOTES
Called Surgery was patched through to Line room spoke with Re DIETZ: Patient has her hearing aid in as she is deaf without it.   Sister is in room with her will take Hearing aid when she is ready to go into surgery

## 2024-07-09 NOTE — ANESTHESIA PROCEDURE NOTES
Arterial Line:    An arterial line was placed using surface landmarks, in the OR for the following indication(s): continuous blood pressure monitoring and blood sampling needed.    A 20 gauge (size), 5 cm (length), Arrow (type) catheter was placed, Seldinger technique not used, into the brachial artery, secured by tape.  Anesthesia type: Local  Local infiltration: Injection (2cc 1% lidocaine)    Events:  patient tolerated procedure well with no complications and EBL 0mL.7/9/2024 8:08 AM  Anesthesiologist: Manuela Morales MD  Other anesthesia staff: Juan Jerez RN  Performed: Other anesthesia staff   Preanesthetic Checklist  Completed: patient identified, IV checked, site marked, risks and benefits discussed, surgical/procedural consents, equipment checked, pre-op evaluation, timeout performed, anesthesia consent given, oxygen available and monitors applied/VS acknowledged

## 2024-07-09 NOTE — ANESTHESIA PROCEDURE NOTES
Procedure Performed: KIARA       Start Time:        End Time:      Preanesthesia Checklist:  Patient identified, IV assessed, risks and benefits discussed, monitors and equipment assessed, procedure being performed at surgeon's request and anesthesia consent obtained.    General Procedure Information  Diagnostic Indications for Echo:  assessment of ascending aorta, hemodynamic monitoring and assessment of valve function  Location performed:  OR  Intubated  Bite block placed  Heart visualized  Probe Insertion:  Easy  Probe Type:  3D  Modalities:  Color flow mapping, 2D only, continuous wave Doppler, pulse wave Doppler, 3D and 2D    Echocardiographic and Doppler Measurements    Ventricles    Right Ventricle:  Cavity size normal.  Hypertrophy not present.  Thrombus not present.  Global function normal.    Left Ventricle:  Cavity size normal.  Hypertrophy not present.  Thrombus not present.  Global Function normal.  Ejection Fraction 60%.      Ventricular Regional Function:  1- Basal Anteroseptal:  normal  2- Basal Anterior:  normal  3- Basal Anterolateral:  normal  4- Basal Inferolateral:  normal  5- Basal Inferior:  normal  6- Basal Inferoseptal:  normal  7- Mid Anteroseptal:  normal  8- Mid Anterior:  normal  9- Mid Anterolateral:  normal  10- Mid Inferolateral:  normal  11- Mid Inferior:  normal  12- Mid Inferoseptal:  normal  13- Apical Anterior:  normal  14- Apical Lateral:  normal  15- Apical Inferior:  normal  16- Apical Septal:  normal  17- [place]:  normal      Valves    Aortic Valve:  Annulus normal.  Stenosis not present.  Regurgitation none.  Leaflets normal.  Leaflet motions normal.      Mitral Valve:  Annulus normal.  Stenosis not present.  Regurgitation none.  Leaflets normal.  Leaflet motions normal.      Tricuspid Valve:  Annulus normal.  Stenosis not present.  Regurgitation none.  Leaflets normal.  Leaflet motions normal.    Pulmonic Valve:  Annulus normal.  Stenosis not present.  Regurgitation mild.

## 2024-07-09 NOTE — PLAN OF CARE
Problem: Metabolic/Fluid and Electrolytes - Adult  Goal: Electrolytes maintained within normal limits  Outcome: Progressing  Goal: Hemodynamic stability and optimal renal function maintained  Outcome: Progressing  Goal: Glucose maintained within prescribed range  Outcome: Progressing     Problem: Hematologic - Adult  Goal: Maintains hematologic stability  Outcome: Progressing     Problem: Neurosensory - Adult  Goal: Achieves stable or improved neurological status  Outcome: Progressing     Problem: Cardiovascular - Adult  Goal: Absence of cardiac dysrhythmias or at baseline  Outcome: Progressing

## 2024-07-09 NOTE — ANESTHESIA PROCEDURE NOTES
Central Venous Line:    A central venous line was placed using ultrasound guidance, in the OR for the following indication(s): central venous access and CVP monitoring.    Sterility preparation included the following: provider used sterile gloves, gown, hat and mask and maximum sterile barriers used during central venous catheter insertion.    The patient was placed in supine position.The right internal jugular vein was prepped.    The site was prepped with Chloraprep.  A 8.5 Fr (size), 10 (length), introducer slick was placed.    During the procedure, the following specific steps were taken: target vein identified, needle advanced into vein and blood aspirated and guidewire advanced into vein.    Intravenous verification was obtained by ultrasound and venous blood return.    Post insertion care included: all ports aspirated, all ports flushed easily, guidewire removed intact, Biopatch applied, line sutured in place and dressing applied.    During the procedure the patient experienced: patient tolerated procedure well with no complications.      Outcomes: uncomplicated  Real-time US image taken/store: Yes  Anesthesia type: local..No  Staffing  Performed: Anesthesiologist   Anesthesiologist: Manuela Morales MD  Resident/CRNA: Juan Jerez RN  Performed by: Manuela Morales MD  Authorized by: Manuela Morales MD    Preanesthetic Checklist  Completed: patient identified, IV checked, site marked, risks and benefits discussed, surgical/procedural consents, equipment checked, pre-op evaluation, timeout performed, anesthesia consent given, oxygen available and monitors applied/VS acknowledged

## 2024-07-09 NOTE — PROGRESS NOTES
Pt released from anesthesia, following all commands, remains intubated at this time, family updated on status.

## 2024-07-09 NOTE — PROGRESS NOTES
Subjective:    Chief complaint:  Patient Was seen in line room 2  Brother is by the bedside    Objective:    /67   Pulse 72   Temp 97.7 °F (36.5 °C) (Temporal)   Resp 16   Ht 1.651 m (5' 5\")   Wt 92.6 kg (204 lb 3.2 oz)   SpO2 98%   BMI 33.98 kg/m²   General : Awake ,alert,no distress.  Heart:  RRR, no murmurs, gallops, or rubs.  Lungs:  CTA bilaterally, no wheeze, rales or rhonchi  Abd: bowel sounds present, nontender, nondistended, no masses  Extrem:  No clubbing, cyanosis, or edema    CBC:   Lab Results   Component Value Date/Time    WBC 5.9 07/08/2024 08:21 AM    RBC 2.93 07/08/2024 08:21 AM    HGB 11.9 07/08/2024 08:21 AM    HCT 35.3 07/08/2024 08:21 AM    .5 07/08/2024 08:21 AM    MCH 40.6 07/08/2024 08:21 AM    MCHC 33.7 07/08/2024 08:21 AM    RDW 11.9 07/08/2024 08:21 AM     07/08/2024 08:21 AM    MPV 10.1 07/08/2024 08:21 AM     BMP:    Lab Results   Component Value Date/Time     07/08/2024 05:45 AM    K 4.3 07/08/2024 05:45 AM     07/08/2024 05:45 AM    CO2 22 07/08/2024 05:45 AM    BUN 16 07/08/2024 05:45 AM    CREATININE 0.8 07/08/2024 05:45 AM    CALCIUM 9.2 07/08/2024 05:45 AM    LABGLOM 83 07/08/2024 05:45 AM    LABGLOM >60 12/18/2023 02:00 PM    GLUCOSE 174 07/08/2024 05:45 AM    GLUCOSE 104 11/23/2010 09:48 AM     PT/INR:    Lab Results   Component Value Date/Time    PROTIME 13.0 07/08/2024 05:45 AM    INR 1.2 07/08/2024 05:45 AM     Troponin:  No results found for: \"TROPONINI\"    No results for input(s): \"LABURIN\" in the last 72 hours.  No results for input(s): \"BC\" in the last 72 hours.  No results for input(s): \"BLOODCULT2\" in the last 72 hours.      Current Facility-Administered Medications:     ceFAZolin (ANCEF) 2,000 mg in sterile water 20 mL IV syringe, 2,000 mg, IntraVENous, On Call to OR, Mar Salas APRN - CNP    mupirocin (BACTROBAN) 2 % ointment, , Each Nostril, BID, Mar Salas APRN - CNP, 1 each at 07/08/24 6970    chlorhexidine  APRN - CNP, 500 mg at 07/02/24 1901    pantoprazole (PROTONIX) tablet 40 mg, 40 mg, Oral, QAM AC, Mar Salas, APRN - CNP, 40 mg at 07/08/24 0601    morphine (PF) injection 1 mg, 1 mg, IntraVENous, Q4H PRN, Mar Salas, APRN - CNP, 1 mg at 07/03/24 2205    Facility-Administered Medications Ordered in Other Encounters:     midazolam (VERSED) injection, , IntraVENous, PRN, Juan Jerez, RN, 1 mg at 07/09/24 0808    Diet NPO Exceptions are: Sips of Water with Meds    Vascular upper extremity arterial segmental pressures with PPG         Vascular ankle brachial index (KAIT)   Final Result   No significant lower extremity peripheral arterial disease by ankle-brachial   indices.         Vascular duplex vein mapping lower bilateral   Final Result   Right greater saphenous vein is patent with measurements ranging from 2.5 -   0.9 mm proximal to distal as discussed above.      Left greater saphenous vein is patent with measurements ranging from 2.6 -   1.4 mm proximal to distal as discussed above.         Vascular duplex carotid bilateral   Final Result   The right internal carotid artery demonstrates 0-50% stenosis.      The left internal carotid artery demonstrates 0-50% stenosis.      Bilateral vertebral arteries are patent with flow in the normal direction.         CT CHEST WO CONTRAST   Final Result   1. No sign of aneurysmal dilatation of the thoracic aorta.   2. Mild coronary calcification and mild calcification of the aortic valve   annulus.   3. Small nonobstructive calculus in the upper pole of the left kidney,   incompletely examined.         XR CHEST PORTABLE   Final Result   No acute cardiopulmonary disease.             Assessment:    Principal Problem:    Chest pain  Active Problems:    Primary hypertension    Type 2 diabetes mellitus without complication, without long-term current use of insulin (HCC)    Elevated troponin    Pure hypercholesterolemia    Moderate obesity    CAD (coronary artery

## 2024-07-09 NOTE — OP NOTE
[facility]  [address] [place] [address]      OPERATIVE REPORT     PATIENT NAME: Sherie Guerrero  : 1960  MEDICAL RECORD NUMBER: [id number]                          ADMIT DATE: 2024  7:56 PM    PROVIDER: Martine Gleason DO     DATE OF PROCEDURE:  2024     PREOPERATIVE DIAGNOSES:  Severe multivessel coronary artery disease, DM, essential thrombocythemia, CHADSVASC 4.     POSTOPERATIVE DIAGNOSES: same     INDICATIONS:  Severe multivessel coronary artery disease, DM     SURGEON:  Martine Gleason DO     ASSISTANT:  Radha SAEZ (no resident was adequately trained to be able to assist).  Radha Claros was present and assisting throughout the critical portion of the operation.     SECOND ASSISTANT:  Mary SAEZ (harvested the vein).     COMPLICATIONS:  None apparent.     ESTIMATED BLOOD LOSS:  Approximately 1000 mL.     ANESTHESIA:  General endotracheal.     ANESTHESIA ATTENDING:  Manuela Morales     SPECIMENS OBTAINED:  none.     DRAINS: 28Fr chest tube in the mediastinum, 19Fr channel drain x2 in the mediastinum, 19Fr channel drain in the left chest     PROCEDURES PERFORMED:  1.  Urgent sternotomy.  2.  Urgent coronary artery bypass grafting x 3; left internal mammary artery to the LAD, saphenous vein graft to the diagonal branch, saphenous vein graft to the distal right coronary artery.  3.  Left atrial appendage occlusion with a 35 mm AtriClip.  4.  Endoscopic harvesting, left lower extremity greater saphenous vein.  5.  Sternal closure with combination of sternal wires and KLS sternal plates     HISTORY:  This is a 63-year-old female who was having chest pain.  She had a cardiac cath, which showed severe multivessel coronary artery disease.  She was referred for coronary artery bypass grafting by Dr. Eldridge.  The patient was described the procedure in full including risks and complications, including but not limited to bleeding, infection, need  THOR STRNL TI PAVEL DRL FREE LEV 1  KLS BENJAMIN LP-WD  N/A 8 Implanted         Drains:   Chest Tube Anterior Mediastinal 1 (Active)   Chest Tube Airleak No 07/09/24 1338   Status Continuous Suction 07/09/24 1338   Suction -20 cm H2O 07/09/24 1338   Y Connector Used Yes 07/09/24 1338   Dressing Status New dressing applied;Clean, dry & intact 07/09/24 1338   Chest Tube Dressing Other (Comment) 07/09/24 1338       Chest Tube Anterior Mediastinal 2 (Active)   Chest Tube Airleak No 07/09/24 1338   Status Continuous Suction 07/09/24 1338   Suction -20 cm H2O 07/09/24 1338   Y Connector Used Yes 07/09/24 1338   Dressing Status New dressing applied;Clean, dry & intact 07/09/24 1338   Chest Tube Dressing Other (Comment) 07/09/24 1338       Chest Tube Anterior Pleural 3 (Active)   Chest Tube Airleak No 07/09/24 1338   Status Continuous Suction 07/09/24 1338   Suction -20 cm H2O 07/09/24 1338   Y Connector Used Yes 07/09/24 1338   Dressing Status New dressing applied;Clean, dry & intact 07/09/24 1338   Chest Tube Dressing Other (Comment) 07/09/24 1338       Chest Tube Anterior Pleural 4 (Active)   Chest Tube Airleak No 07/09/24 1339   Status Continuous Suction 07/09/24 1339   Suction -20 cm H2O 07/09/24 1339   Y Connector Used Yes 07/09/24 1339   Dressing Status New dressing applied;Clean, dry & intact 07/09/24 1339   Chest Tube Dressing Other (Comment) 07/09/24 1339       NG/OG/NJ/NE Tube (Active)       Urinary Catheter 07/09/24 Workman-Temperature (Active)       Findings:  Infection Present At Time Of Surgery (PATOS) (choose all levels that have infection present):  No infection present      Electronically signed by Martine Gleason DO on 7/9/2024 at 2:44 PM

## 2024-07-09 NOTE — PROGRESS NOTES
Patient admitted to CV with the following belongings:  Hearing Aids . (X1)The following belongings admitted with the patient, Jewelry, Purse, Cell Phone, Pants, Shirt, Socks, and Shoes, were sent home with the patient's family.

## 2024-07-09 NOTE — BRIEF OP NOTE
Brief Postoperative Note    Sherie Guerrero  YOB: 1960  [id number]    Pre-operative Diagnosis: severe MV CAD, DM, HTN, essential thrombocythemia    Post-operative Diagnosis: Same    Operation: urgent sternotomy, urgent CABG x3 (LIMA-LAD, SVG-RCA, SVG-diag), EVH, left atrial appendage ligation with 35mm Atriclip    Anesthesia: General    Surgeon: Rosibel    Assistants: Nicolás Claros    Estimated Blood Loss: 1000    Complications: none apparent    Implants: 35 mm Atriclip

## 2024-07-10 ENCOUNTER — APPOINTMENT (OUTPATIENT)
Dept: GENERAL RADIOLOGY | Age: 64
DRG: 233 | End: 2024-07-10
Payer: COMMERCIAL

## 2024-07-10 LAB
ACTIVATED CLOTTING TIME, HIGH RANGE: 100 SEC
ACTIVATED CLOTTING TIME, HIGH RANGE: 109 SEC
ACTIVATED CLOTTING TIME, HIGH RANGE: 444 SEC
ACTIVATED CLOTTING TIME, HIGH RANGE: 467 SEC
ACTIVATED CLOTTING TIME, HIGH RANGE: 474 SEC
ACTIVATED CLOTTING TIME, HIGH RANGE: 664 SEC
ANION GAP SERPL CALCULATED.3IONS-SCNC: 13 MMOL/L (ref 7–16)
B.E.: -2.3 MMOL/L (ref -3–3)
BUN SERPL-MCNC: 9 MG/DL (ref 6–23)
CA-I BLD-SCNC: 1.18 MMOL/L (ref 1.15–1.33)
CA-I BLD-SCNC: 1.22 MMOL/L (ref 1.15–1.33)
CALCIUM SERPL-MCNC: 9 MG/DL (ref 8.6–10.2)
CHLORIDE SERPL-SCNC: 108 MMOL/L (ref 98–107)
CO2 SERPL-SCNC: 20 MMOL/L (ref 22–29)
COHB: 0.2 % (ref 0–1.5)
CREAT SERPL-MCNC: 0.6 MG/DL (ref 0.5–1)
CRITICAL: ABNORMAL
DATE ANALYZED: ABNORMAL
DATE OF COLLECTION: ABNORMAL
ERYTHROCYTE [DISTWIDTH] IN BLOOD BY AUTOMATED COUNT: 11.9 % (ref 11.5–15)
GFR, ESTIMATED: >90 ML/MIN/1.73M2
GLUCOSE BLD-MCNC: 111 MG/DL (ref 74–99)
GLUCOSE BLD-MCNC: 112 MG/DL (ref 74–99)
GLUCOSE BLD-MCNC: 114 MG/DL (ref 74–99)
GLUCOSE BLD-MCNC: 117 MG/DL (ref 74–99)
GLUCOSE BLD-MCNC: 120 MG/DL (ref 74–99)
GLUCOSE BLD-MCNC: 135 MG/DL (ref 74–99)
GLUCOSE BLD-MCNC: 147 MG/DL (ref 74–99)
GLUCOSE BLD-MCNC: 174 MG/DL (ref 74–99)
GLUCOSE BLD-MCNC: 223 MG/DL (ref 74–99)
GLUCOSE BLD-MCNC: 249 MG/DL (ref 74–99)
GLUCOSE BLD-MCNC: 97 MG/DL (ref 74–99)
GLUCOSE SERPL-MCNC: 121 MG/DL (ref 74–99)
HCO3: 22.6 MMOL/L (ref 22–26)
HCT VFR BLD AUTO: 29.7 % (ref 34–48)
HGB BLD-MCNC: 10 G/DL (ref 11.5–15.5)
HHB: 6.6 % (ref 0–5)
INR PPP: 1.2
LAB: ABNORMAL
Lab: 415
MAGNESIUM SERPL-MCNC: 2.3 MG/DL (ref 1.6–2.6)
MCH RBC QN AUTO: 40.8 PG (ref 26–35)
MCHC RBC AUTO-ENTMCNC: 33.7 G/DL (ref 32–34.5)
MCV RBC AUTO: 121.2 FL (ref 80–99.9)
METHB: 0.3 % (ref 0–1.5)
MODE: ABNORMAL
O2 SATURATION: 93.4 % (ref 92–98.5)
O2HB: 92.9 % (ref 94–97)
OPERATOR ID: 2593
PATIENT TEMP: 37 C
PCO2: 39.1 MMHG (ref 35–45)
PH BLOOD GAS: 7.38 (ref 7.35–7.45)
PLATELET # BLD AUTO: 411 K/UL (ref 130–450)
PMV BLD AUTO: 9.9 FL (ref 7–12)
PO2: 72.5 MMHG (ref 75–100)
POTASSIUM SERPL-SCNC: 4.5 MMOL/L (ref 3.5–5)
POTASSIUM SERPL-SCNC: 4.5 MMOL/L (ref 3.5–5)
POTASSIUM SERPL-SCNC: 4.7 MMOL/L (ref 3.5–5)
PROTHROMBIN TIME: 13 SEC (ref 9.3–12.4)
RBC # BLD AUTO: 2.45 M/UL (ref 3.5–5.5)
SODIUM SERPL-SCNC: 141 MMOL/L (ref 132–146)
SOURCE, BLOOD GAS: ABNORMAL
THB: 11.4 G/DL (ref 11.5–16.5)
TIME ANALYZED: 418
WBC OTHER # BLD: 14.1 K/UL (ref 4.5–11.5)

## 2024-07-10 PROCEDURE — 6370000000 HC RX 637 (ALT 250 FOR IP): Performed by: STUDENT IN AN ORGANIZED HEALTH CARE EDUCATION/TRAINING PROGRAM

## 2024-07-10 PROCEDURE — 6370000000 HC RX 637 (ALT 250 FOR IP): Performed by: NURSE PRACTITIONER

## 2024-07-10 PROCEDURE — 80048 BASIC METABOLIC PNL TOTAL CA: CPT

## 2024-07-10 PROCEDURE — 82330 ASSAY OF CALCIUM: CPT

## 2024-07-10 PROCEDURE — 2140000000 HC CCU INTERMEDIATE R&B

## 2024-07-10 PROCEDURE — 94669 MECHANICAL CHEST WALL OSCILL: CPT

## 2024-07-10 PROCEDURE — 85027 COMPLETE CBC AUTOMATED: CPT

## 2024-07-10 PROCEDURE — 36415 COLL VENOUS BLD VENIPUNCTURE: CPT

## 2024-07-10 PROCEDURE — APPSS30 APP SPLIT SHARED TIME 16-30 MINUTES: Performed by: NURSE PRACTITIONER

## 2024-07-10 PROCEDURE — 97166 OT EVAL MOD COMPLEX 45 MIN: CPT

## 2024-07-10 PROCEDURE — 99233 SBSQ HOSP IP/OBS HIGH 50: CPT | Performed by: INTERNAL MEDICINE

## 2024-07-10 PROCEDURE — 84132 ASSAY OF SERUM POTASSIUM: CPT

## 2024-07-10 PROCEDURE — 83735 ASSAY OF MAGNESIUM: CPT

## 2024-07-10 PROCEDURE — 94664 DEMO&/EVAL PT USE INHALER: CPT

## 2024-07-10 PROCEDURE — 99233 SBSQ HOSP IP/OBS HIGH 50: CPT | Performed by: NURSE PRACTITIONER

## 2024-07-10 PROCEDURE — 97162 PT EVAL MOD COMPLEX 30 MIN: CPT

## 2024-07-10 PROCEDURE — 97530 THERAPEUTIC ACTIVITIES: CPT

## 2024-07-10 PROCEDURE — 2580000003 HC RX 258: Performed by: NURSE PRACTITIONER

## 2024-07-10 PROCEDURE — 2580000003 HC RX 258: Performed by: STUDENT IN AN ORGANIZED HEALTH CARE EDUCATION/TRAINING PROGRAM

## 2024-07-10 PROCEDURE — 85610 PROTHROMBIN TIME: CPT

## 2024-07-10 PROCEDURE — 2700000000 HC OXYGEN THERAPY PER DAY

## 2024-07-10 PROCEDURE — 82805 BLOOD GASES W/O2 SATURATION: CPT

## 2024-07-10 PROCEDURE — 6360000002 HC RX W HCPCS: Performed by: STUDENT IN AN ORGANIZED HEALTH CARE EDUCATION/TRAINING PROGRAM

## 2024-07-10 PROCEDURE — 6360000002 HC RX W HCPCS: Performed by: NURSE PRACTITIONER

## 2024-07-10 PROCEDURE — 71045 X-RAY EXAM CHEST 1 VIEW: CPT

## 2024-07-10 PROCEDURE — 94640 AIRWAY INHALATION TREATMENT: CPT

## 2024-07-10 PROCEDURE — 82962 GLUCOSE BLOOD TEST: CPT

## 2024-07-10 RX ORDER — POTASSIUM CHLORIDE 20 MEQ/1
20 TABLET, EXTENDED RELEASE ORAL PRN
Status: DISCONTINUED | OUTPATIENT
Start: 2024-07-10 | End: 2024-07-19 | Stop reason: HOSPADM

## 2024-07-10 RX ORDER — PROCHLORPERAZINE EDISYLATE 5 MG/ML
10 INJECTION INTRAMUSCULAR; INTRAVENOUS EVERY 6 HOURS PRN
Status: DISCONTINUED | OUTPATIENT
Start: 2024-07-10 | End: 2024-07-19 | Stop reason: HOSPADM

## 2024-07-10 RX ORDER — FERROUS SULFATE 325(65) MG
325 TABLET ORAL 2 TIMES DAILY WITH MEALS
Status: DISCONTINUED | OUTPATIENT
Start: 2024-07-10 | End: 2024-07-19 | Stop reason: HOSPADM

## 2024-07-10 RX ORDER — ONDANSETRON 2 MG/ML
4 INJECTION INTRAMUSCULAR; INTRAVENOUS EVERY 8 HOURS PRN
Status: DISCONTINUED | OUTPATIENT
Start: 2024-07-10 | End: 2024-07-19 | Stop reason: HOSPADM

## 2024-07-10 RX ORDER — DEXTROSE MONOHYDRATE 100 MG/ML
INJECTION, SOLUTION INTRAVENOUS CONTINUOUS PRN
Status: DISCONTINUED | OUTPATIENT
Start: 2024-07-10 | End: 2024-07-19 | Stop reason: HOSPADM

## 2024-07-10 RX ORDER — ASPIRIN 81 MG/1
81 TABLET ORAL DAILY
Status: DISCONTINUED | OUTPATIENT
Start: 2024-07-11 | End: 2024-07-19 | Stop reason: HOSPADM

## 2024-07-10 RX ORDER — MAGNESIUM SULFATE IN WATER 40 MG/ML
2000 INJECTION, SOLUTION INTRAVENOUS PRN
Status: DISCONTINUED | OUTPATIENT
Start: 2024-07-10 | End: 2024-07-19 | Stop reason: HOSPADM

## 2024-07-10 RX ORDER — INSULIN GLARGINE 100 [IU]/ML
0.2 INJECTION, SOLUTION SUBCUTANEOUS NIGHTLY
Status: DISCONTINUED | OUTPATIENT
Start: 2024-07-10 | End: 2024-07-11

## 2024-07-10 RX ORDER — BISACODYL 5 MG/1
5 TABLET, DELAYED RELEASE ORAL DAILY
Status: DISCONTINUED | OUTPATIENT
Start: 2024-07-10 | End: 2024-07-19 | Stop reason: HOSPADM

## 2024-07-10 RX ORDER — ACETAMINOPHEN 325 MG/1
650 TABLET ORAL EVERY 4 HOURS PRN
Status: DISCONTINUED | OUTPATIENT
Start: 2024-07-10 | End: 2024-07-19 | Stop reason: HOSPADM

## 2024-07-10 RX ORDER — INSULIN LISPRO 100 [IU]/ML
0-16 INJECTION, SOLUTION INTRAVENOUS; SUBCUTANEOUS
Status: DISCONTINUED | OUTPATIENT
Start: 2024-07-10 | End: 2024-07-19 | Stop reason: HOSPADM

## 2024-07-10 RX ORDER — FOLIC ACID 1 MG/1
1 TABLET ORAL DAILY
Status: DISCONTINUED | OUTPATIENT
Start: 2024-07-10 | End: 2024-07-19 | Stop reason: HOSPADM

## 2024-07-10 RX ORDER — INSULIN LISPRO 100 [IU]/ML
0-4 INJECTION, SOLUTION INTRAVENOUS; SUBCUTANEOUS NIGHTLY
Status: DISCONTINUED | OUTPATIENT
Start: 2024-07-10 | End: 2024-07-19 | Stop reason: HOSPADM

## 2024-07-10 RX ORDER — HYDROXYUREA 500 MG/1
500 CAPSULE ORAL 2 TIMES DAILY
Status: DISCONTINUED | OUTPATIENT
Start: 2024-07-11 | End: 2024-07-17

## 2024-07-10 RX ORDER — DIPHENHYDRAMINE HCL 25 MG
25 TABLET ORAL EVERY 8 HOURS PRN
Status: DISCONTINUED | OUTPATIENT
Start: 2024-07-10 | End: 2024-07-19 | Stop reason: HOSPADM

## 2024-07-10 RX ORDER — POLYETHYLENE GLYCOL 3350 17 G/17G
17 POWDER, FOR SOLUTION ORAL DAILY
Status: DISCONTINUED | OUTPATIENT
Start: 2024-07-10 | End: 2024-07-10 | Stop reason: SDUPTHER

## 2024-07-10 RX ORDER — GLUCAGON 1 MG/ML
1 KIT INJECTION PRN
Status: DISCONTINUED | OUTPATIENT
Start: 2024-07-10 | End: 2024-07-19 | Stop reason: HOSPADM

## 2024-07-10 RX ORDER — ASCORBIC ACID 500 MG
500 TABLET ORAL 2 TIMES DAILY
Status: DISCONTINUED | OUTPATIENT
Start: 2024-07-10 | End: 2024-07-19 | Stop reason: HOSPADM

## 2024-07-10 RX ORDER — SENNA AND DOCUSATE SODIUM 50; 8.6 MG/1; MG/1
1 TABLET, FILM COATED ORAL 2 TIMES DAILY
Status: DISCONTINUED | OUTPATIENT
Start: 2024-07-10 | End: 2024-07-19 | Stop reason: HOSPADM

## 2024-07-10 RX ORDER — AMIODARONE HYDROCHLORIDE 200 MG/1
400 TABLET ORAL PRN
Status: DISCONTINUED | OUTPATIENT
Start: 2024-07-10 | End: 2024-07-19 | Stop reason: HOSPADM

## 2024-07-10 RX ADMIN — SENNOSIDES AND DOCUSATE SODIUM 1 TABLET: 50; 8.6 TABLET ORAL at 21:52

## 2024-07-10 RX ADMIN — INSULIN GLARGINE 19 UNITS: 100 INJECTION, SOLUTION SUBCUTANEOUS at 21:53

## 2024-07-10 RX ADMIN — IPRATROPIUM BROMIDE AND ALBUTEROL SULFATE 1 DOSE: 2.5; .5 SOLUTION RESPIRATORY (INHALATION) at 21:01

## 2024-07-10 RX ADMIN — POLYETHYLENE GLYCOL 3350 17 G: 17 POWDER, FOR SOLUTION ORAL at 08:27

## 2024-07-10 RX ADMIN — HYDROMORPHONE HYDROCHLORIDE 0.25 MG: 1 INJECTION, SOLUTION INTRAMUSCULAR; INTRAVENOUS; SUBCUTANEOUS at 06:16

## 2024-07-10 RX ADMIN — METOPROLOL TARTRATE 25 MG: 25 TABLET, FILM COATED ORAL at 21:52

## 2024-07-10 RX ADMIN — WATER 2000 MG: 1 INJECTION INTRAMUSCULAR; INTRAVENOUS; SUBCUTANEOUS at 14:10

## 2024-07-10 RX ADMIN — INSULIN LISPRO 4 UNITS: 100 INJECTION, SOLUTION INTRAVENOUS; SUBCUTANEOUS at 10:22

## 2024-07-10 RX ADMIN — SENNOSIDES AND DOCUSATE SODIUM 1 TABLET: 50; 8.6 TABLET ORAL at 08:24

## 2024-07-10 RX ADMIN — OXYCODONE HYDROCHLORIDE 10 MG: 10 TABLET ORAL at 10:56

## 2024-07-10 RX ADMIN — ASPIRIN 81 MG: 81 TABLET, COATED ORAL at 08:24

## 2024-07-10 RX ADMIN — IPRATROPIUM BROMIDE AND ALBUTEROL SULFATE 1 DOSE: 2.5; .5 SOLUTION RESPIRATORY (INHALATION) at 08:30

## 2024-07-10 RX ADMIN — WATER 2000 MG: 1 INJECTION INTRAMUSCULAR; INTRAVENOUS; SUBCUTANEOUS at 06:16

## 2024-07-10 RX ADMIN — MUPIROCIN: 20 OINTMENT TOPICAL at 08:31

## 2024-07-10 RX ADMIN — FOLIC ACID 1 MG: 1 TABLET ORAL at 16:48

## 2024-07-10 RX ADMIN — WATER 2000 MG: 1 INJECTION INTRAMUSCULAR; INTRAVENOUS; SUBCUTANEOUS at 21:52

## 2024-07-10 RX ADMIN — OXYCODONE 5 MG: 5 TABLET ORAL at 16:46

## 2024-07-10 RX ADMIN — SODIUM CHLORIDE, PRESERVATIVE FREE 10 ML: 5 INJECTION INTRAVENOUS at 21:53

## 2024-07-10 RX ADMIN — INSULIN LISPRO 8 UNITS: 100 INJECTION, SOLUTION INTRAVENOUS; SUBCUTANEOUS at 16:48

## 2024-07-10 RX ADMIN — SODIUM CHLORIDE 5.1 UNITS/HR: 9 INJECTION, SOLUTION INTRAVENOUS at 06:53

## 2024-07-10 RX ADMIN — FERROUS SULFATE TAB 325 MG (65 MG ELEMENTAL FE) 325 MG: 325 (65 FE) TAB at 16:48

## 2024-07-10 RX ADMIN — ACETAMINOPHEN 1000 MG: 500 TABLET ORAL at 10:56

## 2024-07-10 RX ADMIN — METOPROLOL TARTRATE 25 MG: 25 TABLET, FILM COATED ORAL at 09:23

## 2024-07-10 RX ADMIN — PANTOPRAZOLE SODIUM 40 MG: 40 TABLET, DELAYED RELEASE ORAL at 08:24

## 2024-07-10 RX ADMIN — SODIUM CHLORIDE, PRESERVATIVE FREE 10 ML: 5 INJECTION INTRAVENOUS at 08:25

## 2024-07-10 RX ADMIN — ACETAMINOPHEN 1000 MG: 500 TABLET ORAL at 16:47

## 2024-07-10 RX ADMIN — HYDROMORPHONE HYDROCHLORIDE 0.25 MG: 1 INJECTION, SOLUTION INTRAMUSCULAR; INTRAVENOUS; SUBCUTANEOUS at 03:07

## 2024-07-10 RX ADMIN — IPRATROPIUM BROMIDE AND ALBUTEROL SULFATE 1 DOSE: 2.5; .5 SOLUTION RESPIRATORY (INHALATION) at 11:54

## 2024-07-10 RX ADMIN — INSULIN LISPRO 4 UNITS: 100 INJECTION, SOLUTION INTRAVENOUS; SUBCUTANEOUS at 22:13

## 2024-07-10 RX ADMIN — Medication 400 MG: at 08:24

## 2024-07-10 RX ADMIN — HYDROMORPHONE HYDROCHLORIDE 0.5 MG: 1 INJECTION, SOLUTION INTRAMUSCULAR; INTRAVENOUS; SUBCUTANEOUS at 12:49

## 2024-07-10 RX ADMIN — MUPIROCIN: 20 OINTMENT TOPICAL at 21:54

## 2024-07-10 RX ADMIN — ATORVASTATIN CALCIUM 40 MG: 40 TABLET, FILM COATED ORAL at 21:53

## 2024-07-10 RX ADMIN — HYDROMORPHONE HYDROCHLORIDE 0.5 MG: 1 INJECTION, SOLUTION INTRAMUSCULAR; INTRAVENOUS; SUBCUTANEOUS at 08:33

## 2024-07-10 RX ADMIN — OXYCODONE HYDROCHLORIDE 10 MG: 10 TABLET ORAL at 21:53

## 2024-07-10 RX ADMIN — CLOPIDOGREL BISULFATE 75 MG: 75 TABLET ORAL at 08:24

## 2024-07-10 RX ADMIN — Medication 500 MG: at 21:52

## 2024-07-10 ASSESSMENT — PAIN SCALES - GENERAL
PAINLEVEL_OUTOF10: 4
PAINLEVEL_OUTOF10: 0
PAINLEVEL_OUTOF10: 7
PAINLEVEL_OUTOF10: 8
PAINLEVEL_OUTOF10: 0
PAINLEVEL_OUTOF10: 0
PAINLEVEL_OUTOF10: 2
PAINLEVEL_OUTOF10: 7
PAINLEVEL_OUTOF10: 4
PAINLEVEL_OUTOF10: 4
PAINLEVEL_OUTOF10: 5
PAINLEVEL_OUTOF10: 8
PAINLEVEL_OUTOF10: 7
PAINLEVEL_OUTOF10: 4

## 2024-07-10 ASSESSMENT — PAIN DESCRIPTION - DESCRIPTORS
DESCRIPTORS: BURNING
DESCRIPTORS: DISCOMFORT
DESCRIPTORS: ACHING
DESCRIPTORS: DISCOMFORT
DESCRIPTORS: BURNING;DISCOMFORT;PRESSURE

## 2024-07-10 ASSESSMENT — PAIN DESCRIPTION - LOCATION
LOCATION: CHEST
LOCATION: STERNUM
LOCATION: INCISION
LOCATION: INCISION
LOCATION: CHEST;BACK
LOCATION: CHEST

## 2024-07-10 ASSESSMENT — PAIN - FUNCTIONAL ASSESSMENT
PAIN_FUNCTIONAL_ASSESSMENT: PREVENTS OR INTERFERES SOME ACTIVE ACTIVITIES AND ADLS
PAIN_FUNCTIONAL_ASSESSMENT: ACTIVITIES ARE NOT PREVENTED
PAIN_FUNCTIONAL_ASSESSMENT: PREVENTS OR INTERFERES SOME ACTIVE ACTIVITIES AND ADLS
PAIN_FUNCTIONAL_ASSESSMENT: ACTIVITIES ARE NOT PREVENTED

## 2024-07-10 ASSESSMENT — PAIN DESCRIPTION - ORIENTATION
ORIENTATION: MID
ORIENTATION: RIGHT;LEFT
ORIENTATION: MID
ORIENTATION: MID;ANTERIOR

## 2024-07-10 ASSESSMENT — PAIN SCALES - WONG BAKER: WONGBAKER_NUMERICALRESPONSE: NO HURT

## 2024-07-10 NOTE — PROGRESS NOTES
CVICU Progress Note    Name: Sherie Guerrero  MRN: [id number]    CC: Postoperative Critical Care Management      Indication for Surgery/Procedure: MVCAD,   LVEF:  60%    RVF:  Normal      Important/Relevant PMH/PSH: HTN, HPL, DM, essential thrombocytopenia, GERD, arthritis, PCOS, former smoker      Procedure/Surgeries: 7/9/2024 urgent sternotomy, urgent CABG x3 (LIMA-LAD, SVG-RCA, SVG-diag), EVH, left atrial appendage ligation with 35mm Atriclip      Pacing wires: Ventricular          Intake/Output Summary (Last 24 hours) at 7/10/2024 0851  Last data filed at 7/10/2024 0700  Gross per 24 hour   Intake 2632.29 ml   Output 2830 ml   Net -197.71 ml       Recent Labs     07/08/24  0821 07/09/24  1446 07/10/24  0351   WBC 5.9 10.0 14.1*   HGB 11.9 9.8* 10.0*   HCT 35.3 28.3* 29.7*    326 411      Lab Results   Component Value Date/Time     07/10/2024 03:51 AM    K 4.5 07/10/2024 03:51 AM     07/10/2024 03:51 AM    CO2 20 07/10/2024 03:51 AM    BUN 9 07/10/2024 03:51 AM    CREATININE 0.6 07/10/2024 03:51 AM    GLUCOSE 121 07/10/2024 03:51 AM    GLUCOSE 104 11/23/2010 09:48 AM    CALCIUM 9.0 07/10/2024 03:51 AM    MG 2.3 07/10/2024 03:51 AM         Physical Exam:    BP (!) 101/43   Pulse 84   Temp 99 °F (37.2 °C) (Bladder)   Resp 16   Ht 1.651 m (5' 5\")   Wt 93.3 kg (205 lb 11 oz)   SpO2 (!) 89%   BMI 34.23 kg/m²       CXR Findings:       -  CXR personally viewed and interpreted by ICU Nurse Practitioner, Radiology report pending     General: Awake, alert. On Nipride infusion for HTN.   Eyes: PERRL, anicteric   Pulmonary: Diminished bibasilar. No wheezes, no accessory muscle use noted on 5L NC  Cardiovascular:  RRR, no heaves or thrills on palpation  Tele: SR   Abdomen: Soft, nontender, hypoactive BS   Extremities: Palpable pulses all extremities, no edema   Neurologic/Psych: A&Ox3, MAZARIEGOS to command   Skin: Warm and dry  Incisions: MSI TONY intact, LLE SVG sites with ace wrap       Assessment/Plan:

## 2024-07-10 NOTE — PROGRESS NOTES
2/7   Unsupported sitting  4/7   Sit to stand transfers 3/7   Ambulation 1/7   Total  12/35     Therapeutic Exercises:  BLE ROM x 5 reps    Patient education  Pt educated on safety, PT POC/frequency, sternal precautions    Patient response to education:   Pt verbalized understanding Pt demonstrated skill Pt requires further education in this area   yes yes yes     ASSESSMENT:    Conditions Requiring Skilled Therapeutic Intervention:    [x]Decreased strength     []Decreased ROM  [x]Decreased functional mobility  [x]Decreased balance   [x]Decreased endurance   [x]Decreased posture  [x]Decreased sensation  []Decreased coordination   []Decreased vision  []Decreased safety awareness   [x]Increased pain       Comments:  NP reported pt was medically stable.  Pt was in bed upon arrival, agreeable to initial evaluation.  Pt was educated on sternal precautions and PLB prior to activity.  Increased assistance provided for bed mobility due to deconditioning and precautions.  Mild dizziness reported with positional changes but improved with time.  Pt stood with unsteadiness and flexed posture.  Shuffled steps completed to chair.  Fatigue limited activity.  Pt was left in chair with all needs met and call light in reach.  All lines remained intact.  Educated pt on safety and need for assistance when getting out of chair; pt understood and agreed to use call light.    Treatment:  Patient practiced and was instructed in the following treatment:    Bed mobility training - pt given verbal and tactile cues to facilitate proper sequencing and safety during supine>sit as well as provided with physical assistance.   Sitting EOB for >5 minutes for upright tolerance, postural awareness and BLE ROM  Transfer training - pt was given verbal and tactile cues to facilitate proper hand placement, technique and safety during sit to stand and stand to sit as well as provided with physical assistance.  Gait training- pt was given verbal and tactile  70454  [] High Complexity PT evaluation 92184  [] PT Re-evaluation 67778  [] Gait training 97113 - minutes  [] Manual therapy 62120 - minutes  [x] Therapeutic activities 00367 10 minutes  [] Therapeutic exercises 45768 - minutes  [] Neuromuscular reeducation 33035 - minutes     Long Tucker, PT, DPT  [id number]

## 2024-07-10 NOTE — PROGRESS NOTES
MS 32Fr removed without difficulty. Pt tolerated well. Remaining drains placed to bulb suction and holding.

## 2024-07-10 NOTE — PROGRESS NOTES
[facility]  Hematology/Oncology  Consult      Patient Name: Sherie Guerrero  YOB: 1960  PCP: Ovidio Avery MD   Referring Provider:      Reason for Consultation:   Chief Complaint   Patient presents with    Chest Pain     Patient c/o midsternal chest pain intermittently throughout the day. Denies SOB or dizziness.        History of Present Illness:  This is a 63-year-old female patient following with Dr. Silva for essential thrombocythemia JAK2 negative. She is on treatment with Hydrea 500 mg BID.  She does not take ASA 81 mg as she is allergic. Platelets on 6/6/24 were 493. WBC and Hgb WNL. MCV elevated and stable from hydrea (120). Plan was to continue current dosing of Hydrea. EGD 3/1 reportedly normal. She was to return to the clinic in 3 months. Patient presented to the ED for evaluation of chest pain. Her troponins and ProBNP were slightly elevated but a stress test was positive for inferior ischemia. CTS was consulted and she underwent an urgent LHC which demonstrated severe MV CAD. Because of her LHC and unstable angina, it was thought that she would benefit from revascularization during this hospital stay. Recommend full preoperative work-up including TTE in prep for CABG 7/9/24. She may need aspirin desensitization protocol, discussed with patient. CBC with ., platelets 563. WBC and Hgb WN Consultation for any special recommendations for CPB, post-op care with a history of essential thrombocythemia.    Review of systems: Over 10 systems were reviewed and all were negative except as mention above.      Diagnostic Data:     Past Medical History:   Diagnosis Date    Diabetes (HCC)     Hernia     Hypertension     Obesity     PCOS (polycystic ovarian syndrome)        Patient Active Problem List    Diagnosis Date Noted    Acute postoperative respiratory insufficiency 07/09/2024    Acute post-operative pain 07/09/2024    ACS (acute coronary syndrome) (HCC) 07/08/2024    metFORMIN (GLUCOPHAGE) 500 MG tablet take 1 tablet by mouth twice a day with meals 6/16/20   Ovidio Avery MD   amLODIPine (NORVASC) 10 MG tablet take 1 tablet by mouth once daily 5/27/20   Ovidio Avery MD   pravastatin (PRAVACHOL) 40 MG tablet Take 1 tablet by mouth daily 3/27/20   Ovidio Avery MD   triamcinolone (KENALOG) 0.1 % cream Apply topically to affected areas 2 times daily. 12/2/19   Ovidio Avery MD   omeprazole (PRILOSEC) 40 MG delayed release capsule Take 1 capsule by mouth daily 12/2/19   Ovidio Avery MD   metoprolol tartrate (LOPRESSOR) 50 MG tablet Take 1 tablet by mouth 2 times daily 12/2/19   Ovidio Avery MD   EPINEPHrine (EPIPEN 2-EMIGDIO) 0.3 MG/0.3ML SOAJ injection Inject 0.3 mLs into the muscle once for 1 dose Use as directed for allergic reaction 12/2/19 12/2/19  Ovidio Avery MD   blood glucose monitor strips 1 strip by Other route 2 times daily 11/25/19   Oviido Avery MD   Lancets MISC 1 each by Does not apply route 2 times daily 11/25/19   Ovidio Avery MD   Cholecalciferol (VITAMIN D PO) Take 2,000 Units by mouth daily     ProviderGene MD   glucose monitoring kit (FREESTYLE) monitoring kit 1 kit by Does not apply route 2 times daily 9/19/17   Ovidio Avery MD       Allergies  Allergies   Allergen Reactions    Sulfa Antibiotics Anaphylaxis    Iodine     Mushroom Extract Complex     Pcn [Penicillins]     Shellfish-Derived Products         Objective  BP (!) 118/47   Pulse 79   Temp 98.6 °F (37 °C) (Bladder)   Resp 10   Ht 1.651 m (5' 5\")   Wt 93.3 kg (205 lb 11 oz)   SpO2 94%   BMI 34.23 kg/m²     Physical Exam:   Performance Status:  General: AAO to person, place, time, in no acute distress,   Head and neck : PERRLA, EOMI . Sclera non icteric.  Oropharynx : Clear  Neck: no JVD,  no adenopathy  Heart: Regular rate and regular rhythm, no murmur  Lungs: Clear to auscultation   Extremities: No

## 2024-07-10 NOTE — PLAN OF CARE
Problem: Chronic Conditions and Co-morbidities  Goal: Patient's chronic conditions and co-morbidity symptoms are monitored and maintained or improved  Outcome: Progressing     Problem: Safety - Adult  Goal: Free from fall injury  Outcome: Progressing     Problem: ABCDS Injury Assessment  Goal: Absence of physical injury  Outcome: Progressing     Problem: Pain  Goal: Verbalizes/displays adequate comfort level or baseline comfort level  Outcome: Progressing     Problem: Discharge Planning  Goal: Discharge to home or other facility with appropriate resources  Outcome: Progressing     Problem: Metabolic/Fluid and Electrolytes - Adult  Goal: Electrolytes maintained within normal limits  7/10/2024 0017 by Mere Bailey RN  Outcome: Progressing  7/9/2024 1721 by Cindy Mallory RN  Outcome: Progressing  Goal: Hemodynamic stability and optimal renal function maintained  7/10/2024 0017 by Mere Bailey RN  Outcome: Progressing  7/9/2024 1721 by Cindy Mallory RN  Outcome: Progressing  Goal: Glucose maintained within prescribed range  7/10/2024 0017 by Mere Bailey RN  Outcome: Progressing  7/9/2024 1721 by Cindy Mallory RN  Outcome: Progressing     Problem: Hematologic - Adult  Goal: Maintains hematologic stability  7/10/2024 0017 by Mere Bailey RN  Outcome: Progressing  7/9/2024 1721 by Cindy Mallory RN  Outcome: Progressing     Problem: Neurosensory - Adult  Goal: Achieves stable or improved neurological status  7/10/2024 0017 by Mere Bailey RN  Outcome: Progressing  7/9/2024 1721 by Cindy Mallory RN  Outcome: Progressing     Problem: Cardiovascular - Adult  Goal: Maintains optimal cardiac output and hemodynamic stability  Outcome: Progressing  Goal: Absence of cardiac dysrhythmias or at baseline  7/10/2024 0017 by Mere Bailey RN  Outcome: Progressing  7/9/2024 1721 by Cindy Mallory RN  Outcome: Progressing     Problem: Respiratory - Adult  Goal: Achieves optimal ventilation and

## 2024-07-10 NOTE — PROGRESS NOTES
Evaluation Complexity: Moderate    History: Expanded chart review of consults, imaging, and psychosocial history related to current functional performance.   Exam: 5+ performance deficits identified limiting functional independence and safe return home   Assistance/Modification: Min/mod assistance or modifications required to perform tasks. May have comorbidities that affect occupational performance.    Time In:0845              Time Out: 0910       Total Treatment Time: 10          Min Units   OT Eval Low 52107     OT Eval Medium 90556 X    OT Eval High 39773     OT Re-Eval 47406     Therapeutic Ex 94497     Therapeutic Activities 90744 10 1   ADL/Self Care 75947     Orthotic Management 76063     Neuro Re-Ed 82836     Non-Billable Time       Evaluation time includes thorough review of current medical information, gathering information on past medical history/social history and prior level of function, completion of standardized testing/informal observation of tasks, assessment of data and development of POC/Goals.     Iqra Vázquez, OTR/L 5576

## 2024-07-10 NOTE — PLAN OF CARE
Problem: Chronic Conditions and Co-morbidities  Goal: Patient's chronic conditions and co-morbidity symptoms are monitored and maintained or improved  7/10/2024 0743 by India Rinaldi RN  Outcome: Progressing  Flowsheets (Taken 7/8/2024 1930 by Rohini Peña, RN)  Care Plan - Patient's Chronic Conditions and Co-Morbidity Symptoms are Monitored and Maintained or Improved:   Collaborate with multidisciplinary team to address chronic and comorbid conditions and prevent exacerbation or deterioration   Monitor and assess patient's chronic conditions and comorbid symptoms for stability, deterioration, or improvement   Update acute care plan with appropriate goals if chronic or comorbid symptoms are exacerbated and prevent overall improvement and discharge     Problem: Safety - Adult  Goal: Free from fall injury  7/10/2024 0743 by India Rinaldi RN  Outcome: Progressing     Problem: ABCDS Injury Assessment  Goal: Absence of physical injury  7/10/2024 0743 by India Rinaldi RN  Outcome: Progressing  Flowsheets (Taken 7/8/2024 0817 by Rossipallante, Kimberly, RN)  Absence of Physical Injury: Implement safety measures based on patient assessment     Problem: Pain  Goal: Verbalizes/displays adequate comfort level or baseline comfort level  7/10/2024 0743 by India Rinaldi RN  Outcome: Progressing  Flowsheets (Taken 7/10/2024 0743)  Verbalizes/displays adequate comfort level or baseline comfort level:   Encourage patient to monitor pain and request assistance   Assess pain using appropriate pain scale   Administer analgesics based on type and severity of pain and evaluate response   Implement non-pharmacological measures as appropriate and evaluate response   Consider cultural and social influences on pain and pain management   Notify Licensed Independent Practitioner if interventions unsuccessful or patient reports new pain     Problem: Discharge Planning  Goal: Discharge to home or other facility with appropriate  resources  7/10/2024 0743 by India Rinaldi RN  Outcome: Progressing  Flowsheets (Taken 7/8/2024 1930 by Rohini Peña, RN)  Discharge to home or other facility with appropriate resources:   Identify barriers to discharge with patient and caregiver   Arrange for needed discharge resources and transportation as appropriate   Identify discharge learning needs (meds, wound care, etc)     Problem: Metabolic/Fluid and Electrolytes - Adult  Goal: Electrolytes maintained within normal limits  7/10/2024 0743 by India Rinaldi RN  Outcome: Progressing  Flowsheets (Taken 7/8/2024 1930 by Rohini Peña, RN)  Electrolytes maintained within normal limits:   Monitor labs and assess patient for signs and symptoms of electrolyte imbalances   Administer electrolyte replacement as ordered   Monitor response to electrolyte replacements, including repeat lab results as appropriate     Problem: Metabolic/Fluid and Electrolytes - Adult  Goal: Hemodynamic stability and optimal renal function maintained  7/10/2024 0743 by India Rinaldi RN  Outcome: Progressing  Flowsheets (Taken 7/8/2024 1930 by Rohini Peña, RN)  Hemodynamic stability and optimal renal function maintained:   Monitor labs and assess for signs and symptoms of volume excess or deficit   Monitor intake, output and patient weight   Monitor urine specific gravity, serum osmolarity and serum sodium as indicated or ordered     Problem: Metabolic/Fluid and Electrolytes - Adult  Goal: Glucose maintained within prescribed range  7/10/2024 0743 by India Rinaldi RN  Outcome: Progressing  Flowsheets (Taken 7/10/2024 0743)  Glucose maintained within prescribed range:   Monitor blood glucose as ordered   Assess for signs and symptoms of hyperglycemia and hypoglycemia   Administer ordered medications to maintain glucose within target range   Assess barriers to adequate nutritional intake and initiate nutrition consult as needed   Instruct patient on self management of

## 2024-07-10 NOTE — PROGRESS NOTES
Report called to PCCU. Transport Requested. The following patient belongings:  None were gathered and placed with patient on transfer upon transfer to PCCU. Family notified in person, updated on new unit and room number. Family took cell phone for patient.

## 2024-07-10 NOTE — CARE COORDINATION
7/10 Care Coordination: Pt in [facility] POD#1, CABG x 3. currently on 5L O2,  On Nipride infusion.PTA patient lives with son in a 1 story home with 4 steps to enter the home. DME owned is none. Patient still drives and is independent with ADL's. PCP confirmed is Dr. Harrison and Pharmacy is [facility] on [address]. No hx of [facility] or [facility]. Discharge plan is Home. [facility] is following pt.   Kamran WILLIAMSON,RN--BC  [phone]

## 2024-07-10 NOTE — PROGRESS NOTES
0827    clopidogrel (PLAVIX) tablet 75 mg, 75 mg, Oral, Daily, Linnea Garcia APRN - CNP, 75 mg at 07/10/24 0824    pantoprazole (PROTONIX) tablet 40 mg, 40 mg, Oral, QAM AC, Linnea Garcia APRN - CNP, 40 mg at 07/10/24 0824    lidocaine 4 % external patch 1 patch, 1 patch, TransDERmal, Daily, Linnea Garcia APRN - CNP, 1 patch at 07/10/24 0824    [START ON 7/12/2024] lactulose (CHRONULAC) 10 GM/15ML solution 20 g, 20 g, Oral, TID, iLnnea Garcia APRN - CNP    [START ON 7/12/2024] bisacodyl (DULCOLAX) suppository 10 mg, 10 mg, Rectal, Daily, Linnea Garcia APRN - CNP    atorvastatin (LIPITOR) tablet 40 mg, 40 mg, Oral, Nightly, Linnea Garcia APRN - CNP    ADULT ORAL NUTRITION SUPPLEMENT; Breakfast, Lunch, Dinner; Diabetic Oral Supplement  ADULT ORAL NUTRITION SUPPLEMENT; AM Snack, PM Snack; Other Oral Supplement; Jefe wound healing BID  ADULT DIET; Regular; 4 carb choices (60 gm/meal); Low Fat/Low Chol/High Fiber/GERMAN    XR CHEST PORTABLE   Final Result   Left lung atelectasis/pneumonia as discussed.         XR CHEST PORTABLE   Final Result   1. Postsurgical changes.   2. Low lung volumes with central and bibasilar subsegmental atelectasis.   Possible mild pulmonary edema.         Vascular upper extremity arterial segmental pressures with PPG         Vascular ankle brachial index (KAIT)   Final Result   No significant lower extremity peripheral arterial disease by ankle-brachial   indices.         Vascular duplex vein mapping lower bilateral   Final Result   Right greater saphenous vein is patent with measurements ranging from 2.5 -   0.9 mm proximal to distal as discussed above.      Left greater saphenous vein is patent with measurements ranging from 2.6 -   1.4 mm proximal to distal as discussed above.         Vascular duplex carotid bilateral   Final Result   The right internal carotid artery demonstrates 0-50% stenosis.      The left internal carotid artery demonstrates 0-50% stenosis.       Bilateral vertebral arteries are patent with flow in the normal direction.         CT CHEST WO CONTRAST   Final Result   1. No sign of aneurysmal dilatation of the thoracic aorta.   2. Mild coronary calcification and mild calcification of the aortic valve   annulus.   3. Small nonobstructive calculus in the upper pole of the left kidney,   incompletely examined.         XR CHEST PORTABLE   Final Result   No acute cardiopulmonary disease.         XR CHEST PORTABLE    (Results Pending)       Assessment:    Principal Problem:    Chest pain  Active Problems:    Primary hypertension    Type 2 diabetes mellitus without complication, without long-term current use of insulin (HCC)    Elevated troponin    Pure hypercholesterolemia    Moderate obesity    CAD (coronary artery disease)    CAD in native artery    Aspirin allergy    ACS (acute coronary syndrome) (HCC)    Acute postoperative respiratory insufficiency    Acute post-operative pain  Resolved Problems:    * No resolved hospital problems. *      Plan:  Continue postoperative supportive care  Increase activity as able        Art Blue MD  3:47 PM  7/10/2024    NOTE: This report was transcribed using voice recognition software. Every effort was made to ensure accuracy; however, inadvertent transcription errors may be present

## 2024-07-10 NOTE — PATIENT CARE CONFERENCE
4 Eyes Skin Assessment     NAME:  Sherie Guerrero  YOB: 1960  MEDICAL RECORD NUMBER:  [id number]    The patient is being assessed for  Admission    I agree that at least one RN has performed a thorough Head to Toe Skin Assessment on the patient. ALL assessment sites listed below have been assessed.      Areas assessed by both nurses:    Head, Face, Ears, Shoulders, Back, Chest, Arms, Elbows, Hands, Sacrum. Buttock, Coccyx, Ischium, Legs. Feet and Heels, and Under Medical Devices         Does the Patient have a Wound? No noted wound(s)       Joe Prevention initiated by RN: No  Wound Care Orders initiated by RN: No    Pressure Injury (Stage 3,4, Unstageable, DTI, NWPT, and Complex wounds) if present, place Wound referral order by RN under : No    New Ostomies, if present place, Ostomy referral order under : No     Nurse 1 eSignature: Electronically signed by Anaimka Palmer RN on 7/10/24 at 3:03 PM EDT    **SHARE this note so that the co-signing nurse can place an eSignature**    Nurse 2 eSignature: Electronically signed by Argentina Townsend RN on 7/10/24 at 3:12 PM EDT

## 2024-07-10 NOTE — PROGRESS NOTES
Inpatient Cardiology Progress note        SUBJECTIVE/OBJECTIVE:   Tolerated CABG procedure well.  Extubated, off pressors.      PHYSICAL EXAM:   BP (!) 101/43   Pulse 85   Temp 97.7 °F (36.5 °C) (Axillary)   Resp 15   Ht 1.651 m (5' 5\")   Wt 92.6 kg (204 lb 3.2 oz)   SpO2 97%   BMI 33.98 kg/m²    B/P Range last 24 hours: Systolic (24hrs), Av , Min:101 , Max:140    Diastolic (24hrs), Av, Min:43, Max:68      GENERAL: Not in distress.   HEAD: Normocephalic, Atraumatic.   NECK: No JVD. No carotid bruits. No neck masses.?   CARDIOVASCULAR: Normal rate, regular rhythm, normal S1, S2, no MRG    LUNGS: Clear to auscultation bilaterally, no wheezing.?   ABDOMEN: Abdomen is soft and not distended. No tenderness.  EXTREMITIES:There is no pedal edema.   MUSCULOSKELETAL: No joint swelling. Normal range of motion?   SKIN: Skins is moist. No ulcers or lesions.   NEUROLOGICAL: Conscious, alert, oriented to time, place and person. No evidence of obvious neurological deficits.?   PSYCHOLOGICAL: Patient is in normal mood.?       Intake/Output Summary (Last 24 hours) at 2024  Last data filed at 2024  Gross per 24 hour   Intake 2832.75 ml   Output 2000 ml   Net 832.75 ml       Weight:   Wt Readings from Last 3 Encounters:   24 92.6 kg (204 lb 3.2 oz)   23 87.5 kg (193 lb)   23 89.9 kg (198 lb 3.2 oz)       Current Inpatient Medications:   sodium chloride flush  5-40 mL IntraVENous 2 times per day    [START ON 7/10/2024] aspirin  81 mg Oral Daily    acetaminophen  1,000 mg Oral 4 times per day    [START ON 7/10/2024] magnesium oxide  400 mg Oral Daily    mupirocin   Each Nostril BID    [START ON 7/10/2024] sennosides-docusate sodium  1 tablet Oral BID    ceFAZolin (ANCEF) IVPB  2,000 mg IntraVENous Q8H    ipratropium 0.5 mg-albuterol 2.5 mg  1 Dose Inhalation Q4H WA RT    [START ON 7/10/2024] insulin glargine  0.15 Units/kg SubCUTAneous Nightly    [START ON 7/10/2024] polyethylene  care time on the date of the service which included preparing to see the patient, face-to-face patient care, completing clinical documentation, obtaining and/or reviewing separately obtained history, performing a medically appropriate examination, counseling and educating the patient/family/caregiver, and ordering medications, tests, or procedures.

## 2024-07-10 NOTE — PROGRESS NOTES
Inpatient Cardiology Progress note     PATIENT IS BEING FOLLOWED FOR: MV CAD    Sherie Guerrero is a 63 y.o.  female seen in initial consultation by Dr Carter.    PMH: HTN, T2DM, BMI 33, HLD, ex smoker, thrombocytosis, unremarkable EGD 3/1/2024, GERD, PCOS, and [place].     SE-ED 2024 for CP. Troponin 15>20>35, p-, BMP WNL, Plt 562, Hgb 14.9, WBC 9.3. /82 HR 70-80's SR, afebrile, and O2 saturation 97% on RA. EKG SR rate 78 no ischemic changes.     2024 Stress: inferior ischemia    7/3/2024 LHC MV CAD    7/3/2024 Hematology consult: essential thrombocythemia JAK2 negative, on Hydrea 500 mg BID.     2024 CABG x 3 (LIMA-LAD, SVG-RCA, SVG-diag), EVH, left atrial appendage ligation with 35mm Atriclip     7/10/2024 WBC 14.1(10.0), Bun/Cr 9/0.6, Hgb 10.0, magnesium 2.3, K+ 4.5. On 5 liters NC     SUBJECTIVE: Denies CP or SOB. Complains of feeling tired and incisional pain.   OBJECTIVE: No apparent distress     ROS:  Consist: Denies fevers, chills or night sweats  Heart: Denies chest pain, palpitations, lightheadedness, dizziness or syncope  Lungs: Denies SOB, cough, wheezing, orthopnea or PND  GI: Denies abdominal pain, vomiting or diarrhea    PHYSICAL EXAM:   BP (!) 118/47   Pulse 79   Temp 98.6 °F (37 °C) (Bladder)   Resp 16   Ht 1.651 m (5' 5\")   Wt 93.3 kg (205 lb 11 oz)   SpO2 96%   BMI 34.23 kg/m²    B/P Range last 24 hours: Systolic (24hrs), Av , Min:101 , Max:118    Diastolic (24hrs), Av, Min:43, Max:47    CONST: Well developed, well nourished obese  female who appears older than her  stated age. Awake, alert and cooperative. No apparent distress  HEENT:   Head- Normocephalic, atraumatic   Eyes- Conjunctivae pink, anicteric  Throat- Oral mucosa pink and moist  Neck-  No stridor, trachea midline, no jugular venous distention. RIJ introducer  CHEST: Chest symmetrical and non-tender to palpation. No accessory muscle use or intercostal retractions. TONY

## 2024-07-10 NOTE — PROGRESS NOTES
Physician Progress Note      PATIENT:               ANDREY HYDE  CSN #:                  [id number]  :                       1960  ADMIT DATE:       2024 7:56 PM  DISCH DATE:  RESPONDING  PROVIDER #:        Art Fuller MD          QUERY TEXT:    Dear Provider,    Patient presents with angina underwent a CABG for severe multivessel CAD,   noted documentation of \"ACS\" and chief compliant of\" non-ST elevation   myocardial infarction\" per  cardiology note. If possible, please document   in the progress notes and discharge summary if you are evaluating and/or   treating any of the following:    The medical record reflects the following:  Risk Factors: Multivessel CAD  Clinical Indicators: Documented ACS in the medical record and  Cardiology   Consult noting chief compliant :non-ST elevation myocardial infarction  Troponin, High sensitivity: 15-20-35   Cardiology: ECG:  A most recent resting electrocardiogram reviewed in   conjunction with her evaluation demonstrates evidence of sinus rhythm with   inferolateral T wave inversions consistent with that of an interim inferior   infarct  Telemetry findings reviewed: sinus rhythm, no new tachy/bradyarrhythmias   overnight   Stress test: Stress Combined Conclusion-Abnormal vasodilator SPECT   myocardial perfusion imaging with a reversible inferior perfusion abnormality   suggestive of ischemia of the right coronary distribution in addition to that   of attenuation with associated regional wall motion abnormality suggestive of   postischemic stunning with normal left ventricular systolic function. Based on   perfusion findings in conjunction with the patient's response to vasodilator   administration, an intermediate risk of ischemic events is suggested.  7/3 Echo: LVEF 60%. Evidence of normal size left ventricular chamber with   normal left ventricular systolic function and no significant valvular   abnormalities.  Treatment:  Labs, Imaging, ASA,CABG, Heparin drip, NTG drip for chest pain   symptom relief    Thank you,  Kath Buenrostro RN  Clinical Documentation Integrity  [phone]  Options provided:  -- NSTEMI  -- CAD with Unstable Angina.  -- CAD with angina.  -- Other - I will add my own diagnosis  -- Disagree - Not applicable / Not valid  -- Disagree - Clinically unable to determine / Unknown  -- Refer to Clinical Documentation Reviewer    PROVIDER RESPONSE TEXT:    This patient has unstable angina.    Query created by: Kath Buenrostro on 7/10/2024 9:00 AM      QUERY TEXT:    Dear Provider,    Pt underwent a CABG on 7/9 for severe multivessel CAD. Pt noted to have drop   in H & H from 14.9/42.6 on admission to 9.8/28.3 on 7/9. If possible, please   document in the progress notes and discharge summary if you are evaluating   and/or treating any of the following:    The medical record reflects the following:  Risk Factors: CABG on 7/9,  Clinical Indicators: EBL 1000 ml Pt noted to have drop in H & H from 14.9/42.6   on admission to 9.8/28.3 on  7/9.  Treatment: Trending post op Hct, H & H    Thank you,  Kath Buenrostro RN  Clinical Documentation Integrity  [phone]  Options provided:  -- Postoperative acute blood loss anemia.  -- Other - I will add my own diagnosis  -- Disagree - Not applicable / Not valid  -- Disagree - Clinically unable to determine / Unknown  -- Refer to Clinical Documentation Reviewer    PROVIDER RESPONSE TEXT:    This patient has postoperative acute blood loss anemia.    Query created by: Kath Buenrostro on 7/10/2024 9:13 AM      Electronically signed by:  Art Fuller MD 7/10/2024 2:38 PM

## 2024-07-11 ENCOUNTER — APPOINTMENT (OUTPATIENT)
Dept: GENERAL RADIOLOGY | Age: 64
DRG: 233 | End: 2024-07-11
Payer: COMMERCIAL

## 2024-07-11 LAB
ANION GAP SERPL CALCULATED.3IONS-SCNC: 11 MMOL/L (ref 7–16)
ANION GAP SERPL CALCULATED.3IONS-SCNC: 12 MMOL/L (ref 7–16)
BUN SERPL-MCNC: 20 MG/DL (ref 6–23)
BUN SERPL-MCNC: 25 MG/DL (ref 6–23)
CALCIUM SERPL-MCNC: 8.9 MG/DL (ref 8.6–10.2)
CALCIUM SERPL-MCNC: 9.3 MG/DL (ref 8.6–10.2)
CHLORIDE SERPL-SCNC: 103 MMOL/L (ref 98–107)
CHLORIDE SERPL-SCNC: 104 MMOL/L (ref 98–107)
CO2 SERPL-SCNC: 23 MMOL/L (ref 22–29)
CO2 SERPL-SCNC: 23 MMOL/L (ref 22–29)
CREAT SERPL-MCNC: 1.1 MG/DL (ref 0.5–1)
CREAT SERPL-MCNC: 1.1 MG/DL (ref 0.5–1)
ERYTHROCYTE [DISTWIDTH] IN BLOOD BY AUTOMATED COUNT: 13.3 % (ref 11.5–15)
GFR, ESTIMATED: 55 ML/MIN/1.73M2
GFR, ESTIMATED: 58 ML/MIN/1.73M2
GLUCOSE BLD-MCNC: 152 MG/DL (ref 74–99)
GLUCOSE BLD-MCNC: 209 MG/DL (ref 74–99)
GLUCOSE BLD-MCNC: 224 MG/DL (ref 74–99)
GLUCOSE BLD-MCNC: 262 MG/DL (ref 74–99)
GLUCOSE SERPL-MCNC: 148 MG/DL (ref 74–99)
GLUCOSE SERPL-MCNC: 202 MG/DL (ref 74–99)
HCT VFR BLD AUTO: 31.3 % (ref 34–48)
HGB BLD-MCNC: 9.9 G/DL (ref 11.5–15.5)
MAGNESIUM SERPL-MCNC: 2.5 MG/DL (ref 1.6–2.6)
MCH RBC QN AUTO: 40.9 PG (ref 26–35)
MCHC RBC AUTO-ENTMCNC: 31.6 G/DL (ref 32–34.5)
MCV RBC AUTO: 129.3 FL (ref 80–99.9)
PLATELET # BLD AUTO: 412 K/UL (ref 130–450)
PMV BLD AUTO: 10.6 FL (ref 7–12)
POTASSIUM SERPL-SCNC: 4.3 MMOL/L (ref 3.5–5)
POTASSIUM SERPL-SCNC: 5.1 MMOL/L (ref 3.5–5)
RBC # BLD AUTO: 2.42 M/UL (ref 3.5–5.5)
SODIUM SERPL-SCNC: 137 MMOL/L (ref 132–146)
SODIUM SERPL-SCNC: 139 MMOL/L (ref 132–146)
WBC OTHER # BLD: 17.1 K/UL (ref 4.5–11.5)

## 2024-07-11 PROCEDURE — 6360000002 HC RX W HCPCS: Performed by: NURSE PRACTITIONER

## 2024-07-11 PROCEDURE — 2700000000 HC OXYGEN THERAPY PER DAY

## 2024-07-11 PROCEDURE — 36415 COLL VENOUS BLD VENIPUNCTURE: CPT

## 2024-07-11 PROCEDURE — 93798 PHYS/QHP OP CAR RHAB W/ECG: CPT

## 2024-07-11 PROCEDURE — 6370000000 HC RX 637 (ALT 250 FOR IP): Performed by: INTERNAL MEDICINE

## 2024-07-11 PROCEDURE — 83735 ASSAY OF MAGNESIUM: CPT

## 2024-07-11 PROCEDURE — 6370000000 HC RX 637 (ALT 250 FOR IP): Performed by: NURSE PRACTITIONER

## 2024-07-11 PROCEDURE — 80048 BASIC METABOLIC PNL TOTAL CA: CPT

## 2024-07-11 PROCEDURE — P9047 ALBUMIN (HUMAN), 25%, 50ML: HCPCS | Performed by: NURSE PRACTITIONER

## 2024-07-11 PROCEDURE — 82962 GLUCOSE BLOOD TEST: CPT

## 2024-07-11 PROCEDURE — 2580000003 HC RX 258: Performed by: NURSE PRACTITIONER

## 2024-07-11 PROCEDURE — 51798 US URINE CAPACITY MEASURE: CPT

## 2024-07-11 PROCEDURE — 71045 X-RAY EXAM CHEST 1 VIEW: CPT

## 2024-07-11 PROCEDURE — 2140000000 HC CCU INTERMEDIATE R&B

## 2024-07-11 PROCEDURE — 85027 COMPLETE CBC AUTOMATED: CPT

## 2024-07-11 RX ORDER — IPRATROPIUM BROMIDE AND ALBUTEROL SULFATE 2.5; .5 MG/3ML; MG/3ML
1 SOLUTION RESPIRATORY (INHALATION) EVERY 4 HOURS PRN
Status: DISCONTINUED | OUTPATIENT
Start: 2024-07-11 | End: 2024-07-19 | Stop reason: HOSPADM

## 2024-07-11 RX ORDER — AMIODARONE HYDROCHLORIDE 200 MG/1
400 TABLET ORAL 2 TIMES DAILY
Status: DISPENSED | OUTPATIENT
Start: 2024-07-11 | End: 2024-07-18

## 2024-07-11 RX ORDER — BETHANECHOL CHLORIDE 25 MG/1
25 TABLET ORAL 3 TIMES DAILY
Status: DISCONTINUED | OUTPATIENT
Start: 2024-07-11 | End: 2024-07-18

## 2024-07-11 RX ORDER — INSULIN GLARGINE 100 [IU]/ML
22 INJECTION, SOLUTION SUBCUTANEOUS NIGHTLY
Status: DISCONTINUED | OUTPATIENT
Start: 2024-07-11 | End: 2024-07-12

## 2024-07-11 RX ORDER — ENOXAPARIN SODIUM 100 MG/ML
40 INJECTION SUBCUTANEOUS DAILY
Status: DISCONTINUED | OUTPATIENT
Start: 2024-07-11 | End: 2024-07-19 | Stop reason: HOSPADM

## 2024-07-11 RX ORDER — ALBUMIN (HUMAN) 12.5 G/50ML
50 SOLUTION INTRAVENOUS ONCE
Status: COMPLETED | OUTPATIENT
Start: 2024-07-11 | End: 2024-07-11

## 2024-07-11 RX ADMIN — OXYCODONE HYDROCHLORIDE 10 MG: 10 TABLET ORAL at 03:57

## 2024-07-11 RX ADMIN — WATER 2000 MG: 1 INJECTION INTRAMUSCULAR; INTRAVENOUS; SUBCUTANEOUS at 06:45

## 2024-07-11 RX ADMIN — BETHANECHOL CHLORIDE 25 MG: 25 TABLET ORAL at 20:34

## 2024-07-11 RX ADMIN — SODIUM CHLORIDE, PRESERVATIVE FREE 10 ML: 5 INJECTION INTRAVENOUS at 09:52

## 2024-07-11 RX ADMIN — SENNOSIDES AND DOCUSATE SODIUM 1 TABLET: 50; 8.6 TABLET ORAL at 20:33

## 2024-07-11 RX ADMIN — ACETAMINOPHEN 1000 MG: 500 TABLET ORAL at 11:42

## 2024-07-11 RX ADMIN — SODIUM ZIRCONIUM CYCLOSILICATE 10 G: 10 POWDER, FOR SUSPENSION ORAL at 13:39

## 2024-07-11 RX ADMIN — Medication 500 MG: at 09:47

## 2024-07-11 RX ADMIN — AMIODARONE HYDROCHLORIDE 400 MG: 200 TABLET ORAL at 20:33

## 2024-07-11 RX ADMIN — POLYETHYLENE GLYCOL 3350 17 G: 17 POWDER, FOR SOLUTION ORAL at 09:46

## 2024-07-11 RX ADMIN — ACETAMINOPHEN 1000 MG: 500 TABLET ORAL at 03:58

## 2024-07-11 RX ADMIN — METOPROLOL TARTRATE 12.5 MG: 25 TABLET, FILM COATED ORAL at 09:51

## 2024-07-11 RX ADMIN — BETHANECHOL CHLORIDE 25 MG: 25 TABLET ORAL at 13:40

## 2024-07-11 RX ADMIN — INSULIN LISPRO 8 UNITS: 100 INJECTION, SOLUTION INTRAVENOUS; SUBCUTANEOUS at 09:45

## 2024-07-11 RX ADMIN — PIPERACILLIN AND TAZOBACTAM 3375 MG: 3; .375 INJECTION, POWDER, LYOPHILIZED, FOR SOLUTION INTRAVENOUS at 10:13

## 2024-07-11 RX ADMIN — OXYCODONE HYDROCHLORIDE 10 MG: 10 TABLET ORAL at 20:34

## 2024-07-11 RX ADMIN — PANTOPRAZOLE SODIUM 40 MG: 40 TABLET, DELAYED RELEASE ORAL at 06:46

## 2024-07-11 RX ADMIN — BETHANECHOL CHLORIDE 25 MG: 25 TABLET ORAL at 09:44

## 2024-07-11 RX ADMIN — Medication 400 MG: at 09:51

## 2024-07-11 RX ADMIN — FERROUS SULFATE TAB 325 MG (65 MG ELEMENTAL FE) 325 MG: 325 (65 FE) TAB at 09:46

## 2024-07-11 RX ADMIN — FERROUS SULFATE TAB 325 MG (65 MG ELEMENTAL FE) 325 MG: 325 (65 FE) TAB at 16:57

## 2024-07-11 RX ADMIN — MAGNESIUM HYDROXIDE 30 ML: 400 SUSPENSION ORAL at 09:47

## 2024-07-11 RX ADMIN — INSULIN LISPRO 10 UNITS: 100 INJECTION, SOLUTION INTRAVENOUS; SUBCUTANEOUS at 11:42

## 2024-07-11 RX ADMIN — SENNOSIDES AND DOCUSATE SODIUM 1 TABLET: 50; 8.6 TABLET ORAL at 09:47

## 2024-07-11 RX ADMIN — ASPIRIN 81 MG: 81 TABLET, COATED ORAL at 09:46

## 2024-07-11 RX ADMIN — HYDROXYUREA 500 MG: 500 CAPSULE ORAL at 09:48

## 2024-07-11 RX ADMIN — SODIUM ZIRCONIUM CYCLOSILICATE 10 G: 10 POWDER, FOR SUSPENSION ORAL at 09:45

## 2024-07-11 RX ADMIN — OXYCODONE HYDROCHLORIDE 10 MG: 10 TABLET ORAL at 09:48

## 2024-07-11 RX ADMIN — BISACODYL 5 MG: 5 TABLET, COATED ORAL at 09:48

## 2024-07-11 RX ADMIN — SODIUM CHLORIDE, PRESERVATIVE FREE 10 ML: 5 INJECTION INTRAVENOUS at 22:22

## 2024-07-11 RX ADMIN — MUPIROCIN: 20 OINTMENT TOPICAL at 22:20

## 2024-07-11 RX ADMIN — AMIODARONE HYDROCHLORIDE 400 MG: 200 TABLET ORAL at 09:47

## 2024-07-11 RX ADMIN — INSULIN LISPRO 4 UNITS: 100 INJECTION, SOLUTION INTRAVENOUS; SUBCUTANEOUS at 16:53

## 2024-07-11 RX ADMIN — METOPROLOL TARTRATE 12.5 MG: 25 TABLET, FILM COATED ORAL at 20:34

## 2024-07-11 RX ADMIN — ENOXAPARIN SODIUM 40 MG: 100 INJECTION SUBCUTANEOUS at 09:46

## 2024-07-11 RX ADMIN — ACETAMINOPHEN 1000 MG: 500 TABLET ORAL at 16:52

## 2024-07-11 RX ADMIN — Medication 500 MG: at 20:34

## 2024-07-11 RX ADMIN — INSULIN GLARGINE 22 UNITS: 100 INJECTION, SOLUTION SUBCUTANEOUS at 20:34

## 2024-07-11 RX ADMIN — INSULIN LISPRO 4 UNITS: 100 INJECTION, SOLUTION INTRAVENOUS; SUBCUTANEOUS at 21:28

## 2024-07-11 RX ADMIN — MUPIROCIN: 20 OINTMENT TOPICAL at 09:52

## 2024-07-11 RX ADMIN — FOLIC ACID 1 MG: 1 TABLET ORAL at 09:48

## 2024-07-11 RX ADMIN — CLOPIDOGREL BISULFATE 75 MG: 75 TABLET ORAL at 09:47

## 2024-07-11 RX ADMIN — ALBUMIN (HUMAN) 50 G: 0.25 INJECTION, SOLUTION INTRAVENOUS at 10:07

## 2024-07-11 RX ADMIN — ATORVASTATIN CALCIUM 40 MG: 40 TABLET, FILM COATED ORAL at 20:34

## 2024-07-11 RX ADMIN — HYDROXYUREA 500 MG: 500 CAPSULE ORAL at 20:35

## 2024-07-11 RX ADMIN — PIPERACILLIN AND TAZOBACTAM 3375 MG: 3; .375 INJECTION, POWDER, LYOPHILIZED, FOR SOLUTION INTRAVENOUS at 17:32

## 2024-07-11 RX ADMIN — DIPHENHYDRAMINE HYDROCHLORIDE 25 MG: 25 TABLET ORAL at 20:33

## 2024-07-11 ASSESSMENT — PAIN SCALES - WONG BAKER
WONGBAKER_NUMERICALRESPONSE: HURTS A LITTLE BIT
WONGBAKER_NUMERICALRESPONSE: NO HURT
WONGBAKER_NUMERICALRESPONSE: HURTS A LITTLE BIT

## 2024-07-11 ASSESSMENT — PAIN DESCRIPTION - DESCRIPTORS
DESCRIPTORS: ACHING;DISCOMFORT;SORE
DESCRIPTORS: DISCOMFORT;DULL
DESCRIPTORS: ACHING;DISCOMFORT;SORE
DESCRIPTORS: ACHING;DISCOMFORT;SORE

## 2024-07-11 ASSESSMENT — PAIN SCALES - GENERAL
PAINLEVEL_OUTOF10: 8
PAINLEVEL_OUTOF10: 6
PAINLEVEL_OUTOF10: 6
PAINLEVEL_OUTOF10: 4
PAINLEVEL_OUTOF10: 6
PAINLEVEL_OUTOF10: 6
PAINLEVEL_OUTOF10: 0
PAINLEVEL_OUTOF10: 0

## 2024-07-11 ASSESSMENT — PAIN DESCRIPTION - ORIENTATION
ORIENTATION: ANTERIOR;MID
ORIENTATION: ANTERIOR;MID
ORIENTATION: MID
ORIENTATION: ANTERIOR;MID

## 2024-07-11 ASSESSMENT — PAIN DESCRIPTION - LOCATION
LOCATION: CHEST

## 2024-07-11 NOTE — PLAN OF CARE
Problem: Chronic Conditions and Co-morbidities  Goal: Patient's chronic conditions and co-morbidity symptoms are monitored and maintained or improved  Outcome: Progressing     Problem: Safety - Adult  Goal: Free from fall injury  Outcome: Progressing     Problem: ABCDS Injury Assessment  Goal: Absence of physical injury  Outcome: Progressing     Problem: Pain  Goal: Verbalizes/displays adequate comfort level or baseline comfort level  Outcome: Progressing     Problem: Discharge Planning  Goal: Discharge to home or other facility with appropriate resources  Outcome: Progressing     Problem: Metabolic/Fluid and Electrolytes - Adult  Goal: Electrolytes maintained within normal limits  Outcome: Progressing  Goal: Hemodynamic stability and optimal renal function maintained  Outcome: Progressing  Goal: Glucose maintained within prescribed range  Outcome: Progressing     Problem: Hematologic - Adult  Goal: Maintains hematologic stability  Outcome: Progressing     Problem: Neurosensory - Adult  Goal: Achieves stable or improved neurological status  Outcome: Progressing     Problem: Cardiovascular - Adult  Goal: Maintains optimal cardiac output and hemodynamic stability  Outcome: Progressing  Goal: Absence of cardiac dysrhythmias or at baseline  Outcome: Progressing     Problem: Respiratory - Adult  Goal: Achieves optimal ventilation and oxygenation  Outcome: Progressing     Problem: Gastrointestinal - Adult  Goal: Minimal or absence of nausea and vomiting  Outcome: Progressing  Goal: Maintains or returns to baseline bowel function  Outcome: Progressing  Goal: Maintains adequate nutritional intake  Outcome: Progressing     Problem: Genitourinary - Adult  Goal: Absence of urinary retention  Outcome: Progressing  Goal: Urinary catheter remains patent  Outcome: Progressing     Problem: Skin/Tissue Integrity - Adult  Goal: Skin integrity remains intact  Outcome: Progressing  Goal: Incisions, wounds, or drain sites healing

## 2024-07-11 NOTE — PROGRESS NOTES
POD#2 Awake, alert. No complaints other than fatigue. Denies CP, palpitations, SOB at rest, dizziness/lightheadedness.    Vitals:    07/11/24 0350 07/11/24 0427 07/11/24 0623 07/11/24 0750   BP: 103/80   (!) 108/59   Pulse: (!) 103   72   Resp: 18 20  18   Temp: 97 °F (36.1 °C)   97.6 °F (36.4 °C)   TempSrc: Temporal   Temporal   SpO2: 93%   95%   Weight:   95.4 kg (210 lb 6.4 oz)    Height:         O2: 5L/NC      Intake/Output Summary (Last 24 hours) at 7/11/2024 0822  Last data filed at 7/11/2024 0427  Gross per 24 hour   Intake 875.53 ml   Output 250 ml   Net 625.53 ml         +BM pre-op    UO: unable to void since removal of urinary catheter 7/10 afternoon  CT output: Mediastinal: 30mL/8hr (60mL/24hrs)     Pleural: 40mL/8hr (100mL/24hrs)     Pleural: 50mL/8hr (50mL/24hrs)      Recent Labs     07/09/24  1446 07/10/24  0351 07/11/24  0629   WBC 10.0 14.1* 17.1*   HGB 9.8* 10.0* 9.9*   HCT 28.3* 29.7* 31.3*    411 412      Recent Labs     07/09/24  1446 07/10/24  0351 07/11/24  0629   BUN 11 9 20   CREATININE 0.8 0.6 1.1*         Telemetry: SR      PE  Cardiac: RRR  Lungs: decreased bases  Chest incision with intact TONY DSD. Sternum stable. Surgical bra on. Prior chest tube site incisions C/D/I, no erythema with intact sutures. Chest tubes x 3 and Epicardial pacing wires present and secure.   Abd: Soft, nontender, +BS, +flatus  Ext: Incisions C/D/I, approximated, no erythema, + edema           A/P: POD#2    1. CAD/UA  --Stable s/p urgent sternotomy, urgent CABG x3 (LIMA-LAD, SVG-RCA, SVG-diag), EVH, left atrial appendage ligation with 35mm Atriclip on 7/9/2024  --Post op KIARA reveals 60% EF  --DAPT/statin/BB  --reinforce sternal precautions  --continue epicardial pacing wires  --chest tubes without significant output and without airleaks. Remaining MS and right pleural chest tubes removed without difficulty. Patient tolerated well. Continue left pleural chest tube; remove once improvement in drainage amount

## 2024-07-11 NOTE — RT PROTOCOL NOTE
RT Inhaler-Nebulizer Bronchodilator Protocol Note    There is a bronchodilator order in the chart from a provider indicating to follow the RT Bronchodilator Protocol and there is an “Initiate RT Inhaler-Nebulizer Bronchodilator Protocol” order as well (see protocol at bottom of note).    CXR Findings:  XR CHEST PORTABLE    Result Date: 7/10/2024  Left lung atelectasis/pneumonia as discussed.       The findings from the last RT Protocol Assessment were as follows:   History Pulmonary Disease: Smoker 15 pack years or more (18)  Respiratory Pattern: Regular pattern and RR 12-20 bpm  Breath Sounds: Clear breath sounds  Cough: Strong, spontaneous, non-productive  Indication for Bronchodilator Therapy:    Bronchodilator Assessment Score: 1    Aerosolized bronchodilator medication orders have been revised according to the RT Inhaler-Nebulizer Bronchodilator Protocol below.    Respiratory Therapist to perform RT Therapy Protocol Assessment initially then follow the protocol.  Repeat RT Therapy Protocol Assessment PRN for score 0-3 or on second treatment, BID, and PRN for scores above 3.    No Indications - adjust the frequency to every 6 hours PRN wheezing or bronchospasm, if no treatments needed after 48 hours then discontinue using Per Protocol order mode.     If indication present, adjust the RT bronchodilator orders based on the Bronchodilator Assessment Score as indicated below.  Use Inhaler orders unless patient has one or more of the following: on home nebulizer, not able to hold breath for 10 seconds, is not alert and oriented, cannot activate and use MDI correctly, or respiratory rate 25 breaths per minute or more, then use the equivalent nebulizer order(s) with same Frequency and PRN reasons based on the score.  If a patient is on this medication at home then do not decrease Frequency below that used at home.    0-3 - enter or revise RT bronchodilator order(s) to equivalent RT Bronchodilator order with Frequency

## 2024-07-11 NOTE — CONSULTS
Comprehensive Nutrition Assessment    Type and Reason for Visit:  Initial, Consult, Patient Education    Nutrition Recommendations/Plan:   Continue current diet  Glucerna TID & Jefe BID     Malnutrition Assessment:  Malnutrition Status:  At risk for malnutrition (Comment) (07/11/24 1151)    Context:  Acute Illness     Findings of the 6 clinical characteristics of malnutrition:  Energy Intake:  Mild decrease in energy intake (Comment) (post-op x 2 days)  Weight Loss:  No significant weight loss     Body Fat Loss:  No significant body fat loss     Muscle Mass Loss:  No significant muscle mass loss    Fluid Accumulation:  No significant fluid accumulation     Strength:  Not Performed    Nutrition Assessment:    Pt admit w/ chest pain s/p LHC revealing severe MVCAD & inferior ischemia now s/p CABG x3 on 7/9. Pt transferred out of [facility] w/ poor PO intake post-op. PMHx DM, HLD, HTN, GERD. Heart Healthy/Carb Controlled diet guidelines provided/reviewed w/ pt. Will continue post-op ONS to aid in healing & recovery and will monitor.    Nutrition Related Findings:    pt alert, hypoactive BS, loss of appetite, no edema, +I/Os, CT x3, hyperglycemia, hyperkalemia Wound Type: Multiple, Surgical Incision       Current Nutrition Intake & Therapies:    Average Meal Intake: 26-50%  Average Supplements Intake: 26-50%  ADULT ORAL NUTRITION SUPPLEMENT; Breakfast, Lunch, Dinner; Diabetic Oral Supplement  ADULT ORAL NUTRITION SUPPLEMENT; AM Snack, PM Snack; Other Oral Supplement; Jefe wound healing BID  ADULT DIET; Regular; 4 carb choices (60 gm/meal); Low Fat/Low Chol/High Fiber/GERMAN    Anthropometric Measures:  Height: 165.1 cm (5' 5\")  Ideal Body Weight (IBW): 125 lbs (57 kg)    Admission Body Weight: 92.5 kg (204 lb) (7/8 standing scale)  Current Body Weight: 92.5 kg (204 lb) (7/8 admit wt as CBW elevated), 163.2 % IBW.    Current BMI (kg/m2): 33.9  Usual Body Weight: 89.8 kg (198 lb) (11/2023 actual per EMR)  % Weight Change

## 2024-07-11 NOTE — PROGRESS NOTES
Subjective:    Chief complaint:    Patient is on the floor  Doing well  No new complaints  Physical therapy by the bedside  Objective:    BP (!) 126/56   Pulse 72   Temp 97.9 °F (36.6 °C) (Temporal)   Resp 18   Ht 1.651 m (5' 5\")   Wt 95.4 kg (210 lb 6.4 oz)   SpO2 94%   BMI 35.01 kg/m²   General : Awake ,alert,no distress.  Heart:  RRR, no murmurs, gallops, or rubs.  Lungs:  CTA bilaterally, no wheeze, rales or rhonchi  Abd: bowel sounds present, nontender, nondistended, no masses  Extrem:  No clubbing, cyanosis, or edema    CBC:   Lab Results   Component Value Date/Time    WBC 17.1 07/11/2024 06:29 AM    RBC 2.42 07/11/2024 06:29 AM    HGB 9.9 07/11/2024 06:29 AM    HCT 31.3 07/11/2024 06:29 AM    .3 07/11/2024 06:29 AM    MCH 40.9 07/11/2024 06:29 AM    MCHC 31.6 07/11/2024 06:29 AM    RDW 13.3 07/11/2024 06:29 AM     07/11/2024 06:29 AM    MPV 10.6 07/11/2024 06:29 AM     BMP:    Lab Results   Component Value Date/Time     07/11/2024 06:29 AM    K 5.1 07/11/2024 06:29 AM     07/11/2024 06:29 AM    CO2 23 07/11/2024 06:29 AM    BUN 20 07/11/2024 06:29 AM    CREATININE 1.1 07/11/2024 06:29 AM    CALCIUM 9.3 07/11/2024 06:29 AM    LABGLOM 55 07/11/2024 06:29 AM    LABGLOM >60 12/18/2023 02:00 PM    GLUCOSE 202 07/11/2024 06:29 AM    GLUCOSE 104 11/23/2010 09:48 AM     PT/INR:    Lab Results   Component Value Date/Time    PROTIME 13.0 07/10/2024 03:51 AM    INR 1.2 07/10/2024 03:51 AM     Troponin:  No results found for: \"TROPONINI\"    No results for input(s): \"LABURIN\" in the last 72 hours.  No results for input(s): \"BC\" in the last 72 hours.  No results for input(s): \"BLOODCULT2\" in the last 72 hours.      Current Facility-Administered Medications:     ipratropium 0.5 mg-albuterol 2.5 mg (DUONEB) nebulizer solution 1 Dose, 1 Dose, Inhalation, Q4H PRN, Art Blue MD    enoxaparin (LOVENOX) injection 40 mg, 40 mg, SubCUTAneous, Daily, Mar Salas APRN - CNP, 40 mg at  CNP    acetaminophen (TYLENOL) tablet 1,000 mg, 1,000 mg, Oral, 4 times per day, Linnea Garcia APRN - CNP, 1,000 mg at 07/11/24 1142    oxyCODONE (ROXICODONE) immediate release tablet 5 mg, 5 mg, Oral, Q4H PRN, 5 mg at 07/10/24 1646 **OR** oxyCODONE HCl (OXY-IR) immediate release tablet 10 mg, 10 mg, Oral, Q4H PRN, Linnea Garcia APRN - CNP, 10 mg at 07/11/24 0948    magnesium oxide (MAG-OX) tablet 400 mg, 400 mg, Oral, Daily, Linnea Garcia APRN - CNP, 400 mg at 07/11/24 0951    mupirocin (BACTROBAN) 2 % ointment, , Each Nostril, BID, Linnea Garcia APRN - CNP, Given at 07/11/24 0952    bisacodyl (DULCOLAX) suppository 10 mg, 10 mg, Rectal, Daily PRN, Linnea Garcia APRN - CNP    polyethylene glycol (GLYCOLAX) packet 17 g, 17 g, Oral, Daily, Linnea Garcia APRN - CNP, 17 g at 07/11/24 0946    clopidogrel (PLAVIX) tablet 75 mg, 75 mg, Oral, Daily, Linnea Garcia APRN - CNP, 75 mg at 07/11/24 0947    pantoprazole (PROTONIX) tablet 40 mg, 40 mg, Oral, QAM AC, Linnea Garcia APRN - CNP, 40 mg at 07/11/24 0646    lidocaine 4 % external patch 1 patch, 1 patch, TransDERmal, Daily, Linnea Garcia APRN - CNP, 1 patch at 07/11/24 0946    [START ON 7/12/2024] lactulose (CHRONULAC) 10 GM/15ML solution 20 g, 20 g, Oral, TID, Linnea Garcia APRN - CNP    [START ON 7/12/2024] bisacodyl (DULCOLAX) suppository 10 mg, 10 mg, Rectal, Daily, Linnea Garcia APRN - CNP    atorvastatin (LIPITOR) tablet 40 mg, 40 mg, Oral, Nightly, Linnea Garcia, APRN - CNP, 40 mg at 07/10/24 1635    ADULT DIET; Regular; 4 carb choices (60 gm/meal); Low Fat/Low Chol/High Fiber/GERMAN  ADULT ORAL NUTRITION SUPPLEMENT; Breakfast, Lunch, Dinner; Diabetic Oral Supplement  ADULT ORAL NUTRITION SUPPLEMENT; Breakfast, Dinner; Wound Healing Oral Supplement    XR CHEST PORTABLE   Final Result   Left lung atelectasis/pneumonia as discussed.         XR CHEST PORTABLE   Final Result   1. Postsurgical changes.   2. Low lung volumes

## 2024-07-11 NOTE — PLAN OF CARE
Problem: Chronic Conditions and Co-morbidities  Goal: Patient's chronic conditions and co-morbidity symptoms are monitored and maintained or improved  7/10/2024 2004 by Sussy Bingham RN  Outcome: Progressing     Problem: Safety - Adult  Goal: Free from fall injury  7/10/2024 2004 by Sussy Bingham, RN  Outcome: Progressing     Problem: Pain  Goal: Verbalizes/displays adequate comfort level or baseline comfort level  7/10/2024 2004 by Sussy Bingham RN  Outcome: Progressing     Problem: Discharge Planning  Goal: Discharge to home or other facility with appropriate resources  7/10/2024 2004 by Sussy Bingham, RN  Outcome: Progressing     Problem: Metabolic/Fluid and Electrolytes - Adult  Goal: Electrolytes maintained within normal limits  7/10/2024 2004 by Sussy Bingham, RN  Outcome: Progressing     Problem: Metabolic/Fluid and Electrolytes - Adult  Goal: Hemodynamic stability and optimal renal function maintained  7/10/2024 2004 by Sussy Bingham, RN  Outcome: Progressing     Problem: Skin/Tissue Integrity - Adult  Goal: Skin integrity remains intact  Recent Flowsheet Documentation  Taken 7/10/2024 2003 by Sussy Bingham, RN  Skin Integrity Remains Intact: Monitor for areas of redness and/or skin breakdown

## 2024-07-11 NOTE — DISCHARGE INSTR - COC
Continuity of Care Form    Patient Name: Sherie Guerrero   :  1960  MRN:  [id number]    Admit date:  2024  Discharge date:  2024    Code Status Order: Full Code   Advance Directives:   Advance Care Flowsheet Documentation       Date/Time Healthcare Directive Type of Healthcare Directive Copy in Chart Healthcare Agent Appointed Healthcare Agent's Name Healthcare Agent's Phone Number    24 0010 No, patient does not have an advance directive for healthcare treatment -- -- -- -- --            Admitting Physician:  Martine Gleason DO  PCP: Ovidio Avery MD    Discharging Nurse: Argentina Townsend RN  Discharging Hospital Unit/Room#: 6519/6519-A  Discharging Unit Phone Number: [phone]    Emergency Contact:   Extended Emergency Contact Information  Primary Emergency Contact: Zoey Puckett  Address: [address] [place] [address]  Home Phone: [phone]  Mobile Phone: [phone]  Relation: Brother/Sister   needed? No  Secondary Emergency Contact: Lee Guerrero  Mobile Phone: [phone]  Relation: Child    Past Surgical History:  Past Surgical History:   Procedure Laterality Date    CARDIAC PROCEDURE N/A 7/3/2024    Left heart cath / coronary angiography performed by Tami Eldridge MD at [facility] [place] [place] [facility]    CARDIAC PROCEDURE N/A 7/3/2024    Fractional flow reserve (FFR) performed by Tami Eldridge MD at [facility] CARDIAC CATH LAB    CHOLECYSTECTOMY      CORONARY ARTERY BYPASS GRAFT N/A 2024    Coronary Artery Bypass Grafting performed by Martine Gleason DO at [facility] OR    CYST REMOVAL      ON NECK    FOOT SURGERY         Immunization History:   Immunization History   Administered Date(s) Administered    Influenza Virus Vaccine 10/14/2015    Influenza, FLUARIX, FLULAVAL, FLUZONE (age 6 mo+) AND AFLURIA, (age 3 y+), PF, 0.5mL 2016, 2017, 10/05/2018, 2019       Active Problems:  Patient Active Problem  List   Diagnosis Code    Gastroesophageal reflux disease K21.9    Primary hypertension I10    Type 2 diabetes mellitus without complication, without long-term current use of insulin ([facility]) E11.9    Thrombocytosis D75.839    Chest pain R07.9    Elevated troponin R79.89    Pure hypercholesterolemia E78.00    Moderate obesity E66.8    CAD (coronary artery disease) I25.10    CAD in native artery I25.10    Aspirin allergy Z88.8    ACS (acute coronary syndrome) ([facility]) I24.9    Acute postoperative respiratory insufficiency J95.89    Acute post-operative pain G89.18       Isolation/Infection:   Isolation            No Isolation          Patient Infection Status       None to display            Nurse Assessment:  Last Vital Signs: BP (!) 108/59   Pulse 72   Temp 97.6 °F (36.4 °C) (Temporal)   Resp 18   Ht 1.651 m (5' 5\")   Wt 95.4 kg (210 lb 6.4 oz)   SpO2 95%   BMI 35.01 kg/m²     Last documented pain score (0-10 scale): Pain Level: 0  Last Weight:   Wt Readings from Last 1 Encounters:   07/11/24 95.4 kg (210 lb 6.4 oz)     Mental Status:  oriented and alert    IV Access:  - None    Nursing Mobility/ADLs:  Walking   Assisted  Transfer  Assisted  Bathing  Assisted  Dressing  Assisted  Toileting  Assisted  Feeding  Independent  Med Admin  Independent  Med Delivery   whole    Wound Care Documentation and Therapy:  Puncture 07/09/24 Femoral (Active)   Closure Surgical glue;Adhesive bandage 07/09/24 1339   Culture Taken No 07/09/24 1339   Drainage Amount None 07/09/24 1339   Odor None 07/09/24 1339   Dressing/Treatment Adhesive bandage;Gauze dressing/dressing sponge;Skin glue 07/09/24 1339   Dressing Changed Changed/New 07/09/24 1339   Dressing Status Clean, dry & intact;New dressing applied 07/09/24 1339   Number of days: 2       Incision 07/09/24 Sternum Anterior (Active)   Dressing Status Clean;Dry;Intact 07/11/24 0400   Incision Cleansed Not Cleansed 07/11/24 0400   Dressing/Treatment Other (comment) 07/11/24 0400

## 2024-07-11 NOTE — ANESTHESIA POSTPROCEDURE EVALUATION
Department of Anesthesiology  Postprocedure Note    Patient: Sherie Guerrero  MRN: [id number]  YOB: 1960  Date of evaluation: 7/9/2024    Procedure Summary       Date: 07/09/24 Room / Location: [address]    Anesthesia Start: 0948 Anesthesia Stop: 1458    Procedure: Coronary Artery Bypass Grafting Diagnosis:       CAD in native artery      (CAD in native artery [I25.10])    Surgeons: Martine Gleason DO Responsible Provider: Manuela Morales MD    Anesthesia Type: general ASA Status: 4            Anesthesia Type: No value filed.    Diana Phase I: Diana Score: 9    Diana Phase II:      Anesthesia Post Evaluation    Patient location during evaluation: ICU  Patient participation: complete - patient cannot participate  Level of consciousness: sedated and ventilated  Pain score: 0  Airway patency: patent  Nausea & Vomiting: no nausea  Cardiovascular status: hemodynamically stable  Respiratory status: ventilator  Hydration status: stable  Multimodal analgesia pain management approach        No notable events documented.

## 2024-07-11 NOTE — PROGRESS NOTES
[facility] Quality Flow/Interdisciplinary Rounds Progress Note        Quality Flow Rounds held on July 11, 2024    Disciplines Attending:  Bedside Nurse, , , and Nursing Unit Leadership    Sherie Guerrero was admitted on 7/1/2024  7:56 PM    Anticipated Discharge Date:       Disposition:    Joe Score:  Joe Scale Score: 20    Readmission Risk              Risk of Unplanned Readmission:  16           Discussed patient goal for the day, patient clinical progression, and barriers to discharge.  The following Goal(s) of the Day/Commitment(s) have been identified:  Diagnostics - Report Results and Labs - Report Results      Tameka Obando RN  July 11, 2024

## 2024-07-11 NOTE — CARE COORDINATION
7/11. POD#2 CABG x 3. VSS. On O2 @ 5L. Pacing wires and CT x 3 intact. For PT/OT. Discharge plan is to return home with son and [facility] when medically stable.me

## 2024-07-12 LAB
ABO/RH: NORMAL
ALBUMIN SERPL-MCNC: 3.4 G/DL (ref 3.5–5.2)
ALP SERPL-CCNC: 80 U/L (ref 35–104)
ALT SERPL-CCNC: 26 U/L (ref 0–32)
ANION GAP SERPL CALCULATED.3IONS-SCNC: 9 MMOL/L (ref 7–16)
ANTIBODY SCREEN: NEGATIVE
ARM BAND NUMBER: NORMAL
AST SERPL-CCNC: 30 U/L (ref 0–31)
BILIRUB DIRECT SERPL-MCNC: <0.2 MG/DL (ref 0–0.3)
BILIRUB INDIRECT SERPL-MCNC: ABNORMAL MG/DL (ref 0–1)
BILIRUB SERPL-MCNC: 0.5 MG/DL (ref 0–1.2)
BLOOD BANK DISPENSE STATUS: NORMAL
BLOOD BANK SAMPLE EXPIRATION: NORMAL
BPU ID: NORMAL
BUN SERPL-MCNC: 26 MG/DL (ref 6–23)
CALCIUM SERPL-MCNC: 9.1 MG/DL (ref 8.6–10.2)
CHLORIDE SERPL-SCNC: 102 MMOL/L (ref 98–107)
CO2 SERPL-SCNC: 24 MMOL/L (ref 22–29)
COMPONENT: NORMAL
CREAT SERPL-MCNC: 0.9 MG/DL (ref 0.5–1)
CROSSMATCH RESULT: NORMAL
ERYTHROCYTE [DISTWIDTH] IN BLOOD BY AUTOMATED COUNT: 13.1 % (ref 11.5–15)
GFR, ESTIMATED: 70 ML/MIN/1.73M2
GLUCOSE BLD-MCNC: 162 MG/DL (ref 74–99)
GLUCOSE BLD-MCNC: 175 MG/DL (ref 74–99)
GLUCOSE BLD-MCNC: 185 MG/DL (ref 74–99)
GLUCOSE BLD-MCNC: 263 MG/DL (ref 74–99)
GLUCOSE SERPL-MCNC: 193 MG/DL (ref 74–99)
HCT VFR BLD AUTO: 28.9 % (ref 34–48)
HGB BLD-MCNC: 9.2 G/DL (ref 11.5–15.5)
MAGNESIUM SERPL-MCNC: 2.5 MG/DL (ref 1.6–2.6)
MCH RBC QN AUTO: 41.3 PG (ref 26–35)
MCHC RBC AUTO-ENTMCNC: 31.8 G/DL (ref 32–34.5)
MCV RBC AUTO: 129.6 FL (ref 80–99.9)
PLATELET # BLD AUTO: 359 K/UL (ref 130–450)
PMV BLD AUTO: 10.5 FL (ref 7–12)
POTASSIUM SERPL-SCNC: 4.6 MMOL/L (ref 3.5–5)
PROT SERPL-MCNC: 6.4 G/DL (ref 6.4–8.3)
RBC # BLD AUTO: 2.23 M/UL (ref 3.5–5.5)
SODIUM SERPL-SCNC: 135 MMOL/L (ref 132–146)
TRANSFUSION STATUS: NORMAL
UNIT DIVISION: 0
WBC OTHER # BLD: 15.8 K/UL (ref 4.5–11.5)

## 2024-07-12 PROCEDURE — 2140000000 HC CCU INTERMEDIATE R&B

## 2024-07-12 PROCEDURE — 2580000003 HC RX 258: Performed by: NURSE PRACTITIONER

## 2024-07-12 PROCEDURE — 82962 GLUCOSE BLOOD TEST: CPT

## 2024-07-12 PROCEDURE — 2700000000 HC OXYGEN THERAPY PER DAY

## 2024-07-12 PROCEDURE — 6370000000 HC RX 637 (ALT 250 FOR IP): Performed by: PHYSICIAN ASSISTANT

## 2024-07-12 PROCEDURE — 36415 COLL VENOUS BLD VENIPUNCTURE: CPT

## 2024-07-12 PROCEDURE — 82248 BILIRUBIN DIRECT: CPT

## 2024-07-12 PROCEDURE — 6370000000 HC RX 637 (ALT 250 FOR IP): Performed by: INTERNAL MEDICINE

## 2024-07-12 PROCEDURE — 93798 PHYS/QHP OP CAR RHAB W/ECG: CPT

## 2024-07-12 PROCEDURE — 80053 COMPREHEN METABOLIC PANEL: CPT

## 2024-07-12 PROCEDURE — 6370000000 HC RX 637 (ALT 250 FOR IP): Performed by: NURSE PRACTITIONER

## 2024-07-12 PROCEDURE — 85027 COMPLETE CBC AUTOMATED: CPT

## 2024-07-12 PROCEDURE — 83735 ASSAY OF MAGNESIUM: CPT

## 2024-07-12 PROCEDURE — 6360000002 HC RX W HCPCS: Performed by: NURSE PRACTITIONER

## 2024-07-12 PROCEDURE — 6360000002 HC RX W HCPCS: Performed by: PHYSICIAN ASSISTANT

## 2024-07-12 RX ORDER — INSULIN GLARGINE 100 [IU]/ML
24 INJECTION, SOLUTION SUBCUTANEOUS NIGHTLY
Status: DISCONTINUED | OUTPATIENT
Start: 2024-07-12 | End: 2024-07-19 | Stop reason: HOSPADM

## 2024-07-12 RX ORDER — BUMETANIDE 0.25 MG/ML
1 INJECTION INTRAMUSCULAR; INTRAVENOUS ONCE
Status: COMPLETED | OUTPATIENT
Start: 2024-07-12 | End: 2024-07-12

## 2024-07-12 RX ADMIN — ACETAMINOPHEN 1000 MG: 500 TABLET ORAL at 06:09

## 2024-07-12 RX ADMIN — BISACODYL 5 MG: 5 TABLET, COATED ORAL at 08:20

## 2024-07-12 RX ADMIN — Medication 500 MG: at 08:20

## 2024-07-12 RX ADMIN — OXYCODONE HYDROCHLORIDE 10 MG: 10 TABLET ORAL at 06:10

## 2024-07-12 RX ADMIN — LACTULOSE 20 G: 20 SOLUTION ORAL at 08:26

## 2024-07-12 RX ADMIN — CLOPIDOGREL BISULFATE 75 MG: 75 TABLET ORAL at 08:20

## 2024-07-12 RX ADMIN — Medication 400 MG: at 08:20

## 2024-07-12 RX ADMIN — MUPIROCIN: 20 OINTMENT TOPICAL at 22:25

## 2024-07-12 RX ADMIN — FERROUS SULFATE TAB 325 MG (65 MG ELEMENTAL FE) 325 MG: 325 (65 FE) TAB at 16:58

## 2024-07-12 RX ADMIN — BETHANECHOL CHLORIDE 25 MG: 25 TABLET ORAL at 08:20

## 2024-07-12 RX ADMIN — METOPROLOL TARTRATE 12.5 MG: 25 TABLET, FILM COATED ORAL at 08:20

## 2024-07-12 RX ADMIN — INSULIN LISPRO 4 UNITS: 100 INJECTION, SOLUTION INTRAVENOUS; SUBCUTANEOUS at 08:22

## 2024-07-12 RX ADMIN — SENNOSIDES AND DOCUSATE SODIUM 1 TABLET: 50; 8.6 TABLET ORAL at 21:04

## 2024-07-12 RX ADMIN — AMIODARONE HYDROCHLORIDE 400 MG: 200 TABLET ORAL at 21:03

## 2024-07-12 RX ADMIN — AMIODARONE HYDROCHLORIDE 400 MG: 200 TABLET ORAL at 08:20

## 2024-07-12 RX ADMIN — BETHANECHOL CHLORIDE 25 MG: 25 TABLET ORAL at 21:11

## 2024-07-12 RX ADMIN — ENOXAPARIN SODIUM 40 MG: 100 INJECTION SUBCUTANEOUS at 08:19

## 2024-07-12 RX ADMIN — INSULIN LISPRO 10 UNITS: 100 INJECTION, SOLUTION INTRAVENOUS; SUBCUTANEOUS at 12:25

## 2024-07-12 RX ADMIN — PIPERACILLIN AND TAZOBACTAM 3375 MG: 3; .375 INJECTION, POWDER, LYOPHILIZED, FOR SOLUTION INTRAVENOUS at 06:13

## 2024-07-12 RX ADMIN — PANTOPRAZOLE SODIUM 40 MG: 40 TABLET, DELAYED RELEASE ORAL at 06:09

## 2024-07-12 RX ADMIN — FOLIC ACID 1 MG: 1 TABLET ORAL at 08:20

## 2024-07-12 RX ADMIN — MAGNESIUM HYDROXIDE 30 ML: 400 SUSPENSION ORAL at 08:23

## 2024-07-12 RX ADMIN — OXYCODONE 5 MG: 5 TABLET ORAL at 12:24

## 2024-07-12 RX ADMIN — POLYETHYLENE GLYCOL 3350 17 G: 17 POWDER, FOR SOLUTION ORAL at 08:19

## 2024-07-12 RX ADMIN — INSULIN GLARGINE 24 UNITS: 100 INJECTION, SOLUTION SUBCUTANEOUS at 21:11

## 2024-07-12 RX ADMIN — ACETAMINOPHEN 1000 MG: 500 TABLET ORAL at 12:25

## 2024-07-12 RX ADMIN — ATORVASTATIN CALCIUM 40 MG: 40 TABLET, FILM COATED ORAL at 21:05

## 2024-07-12 RX ADMIN — SENNOSIDES AND DOCUSATE SODIUM 1 TABLET: 50; 8.6 TABLET ORAL at 08:20

## 2024-07-12 RX ADMIN — DIPHENHYDRAMINE HYDROCHLORIDE 25 MG: 25 TABLET ORAL at 21:05

## 2024-07-12 RX ADMIN — METOPROLOL TARTRATE 12.5 MG: 25 TABLET, FILM COATED ORAL at 21:05

## 2024-07-12 RX ADMIN — BUMETANIDE 1 MG: 0.25 INJECTION INTRAMUSCULAR; INTRAVENOUS at 12:25

## 2024-07-12 RX ADMIN — ASPIRIN 81 MG: 81 TABLET, COATED ORAL at 08:20

## 2024-07-12 RX ADMIN — SODIUM CHLORIDE, PRESERVATIVE FREE 10 ML: 5 INJECTION INTRAVENOUS at 22:25

## 2024-07-12 RX ADMIN — MUPIROCIN: 20 OINTMENT TOPICAL at 08:26

## 2024-07-12 RX ADMIN — HYDROXYUREA 500 MG: 500 CAPSULE ORAL at 08:20

## 2024-07-12 RX ADMIN — Medication 500 MG: at 21:04

## 2024-07-12 RX ADMIN — OXYCODONE HYDROCHLORIDE 10 MG: 10 TABLET ORAL at 21:04

## 2024-07-12 RX ADMIN — INSULIN LISPRO 4 UNITS: 100 INJECTION, SOLUTION INTRAVENOUS; SUBCUTANEOUS at 16:58

## 2024-07-12 RX ADMIN — SODIUM CHLORIDE, PRESERVATIVE FREE 10 ML: 5 INJECTION INTRAVENOUS at 08:26

## 2024-07-12 RX ADMIN — FERROUS SULFATE TAB 325 MG (65 MG ELEMENTAL FE) 325 MG: 325 (65 FE) TAB at 08:20

## 2024-07-12 RX ADMIN — PIPERACILLIN AND TAZOBACTAM 3375 MG: 3; .375 INJECTION, POWDER, LYOPHILIZED, FOR SOLUTION INTRAVENOUS at 14:43

## 2024-07-12 ASSESSMENT — PAIN SCALES - GENERAL
PAINLEVEL_OUTOF10: 7
PAINLEVEL_OUTOF10: 0
PAINLEVEL_OUTOF10: 8
PAINLEVEL_OUTOF10: 5

## 2024-07-12 ASSESSMENT — PAIN SCALES - WONG BAKER
WONGBAKER_NUMERICALRESPONSE: HURTS A LITTLE BIT
WONGBAKER_NUMERICALRESPONSE: HURTS A LITTLE BIT

## 2024-07-12 ASSESSMENT — PAIN DESCRIPTION - ORIENTATION
ORIENTATION: MID
ORIENTATION: MID

## 2024-07-12 ASSESSMENT — PAIN DESCRIPTION - LOCATION
LOCATION: CHEST;BACK
LOCATION: CHEST

## 2024-07-12 ASSESSMENT — PAIN DESCRIPTION - DESCRIPTORS
DESCRIPTORS: ACHING;DISCOMFORT;DULL
DESCRIPTORS: ACHING;DISCOMFORT;DULL

## 2024-07-12 NOTE — PROGRESS NOTES
Subjective:    Chief complaint:      No new complaints  Physical therapy by the bedside  Objective:    /61   Pulse 75   Temp 97.6 °F (36.4 °C) (Temporal)   Resp 20   Ht 1.651 m (5' 5\")   Wt 97.1 kg (214 lb 1.6 oz)   SpO2 96%   BMI 35.63 kg/m²   General : Awake ,alert,no distress.  Heart:  RRR, no murmurs, gallops, or rubs.  Lungs:  CTA bilaterally, no wheeze, rales or rhonchi  Abd: bowel sounds present, nontender, nondistended, no masses  Extrem:  No clubbing, cyanosis, or edema    CBC:   Lab Results   Component Value Date/Time    WBC 15.8 07/12/2024 06:26 AM    RBC 2.23 07/12/2024 06:26 AM    HGB 9.2 07/12/2024 06:26 AM    HCT 28.9 07/12/2024 06:26 AM    .6 07/12/2024 06:26 AM    MCH 41.3 07/12/2024 06:26 AM    MCHC 31.8 07/12/2024 06:26 AM    RDW 13.1 07/12/2024 06:26 AM     07/12/2024 06:26 AM    MPV 10.5 07/12/2024 06:26 AM     BMP:    Lab Results   Component Value Date/Time     07/12/2024 06:26 AM    K 4.6 07/12/2024 06:26 AM     07/12/2024 06:26 AM    CO2 24 07/12/2024 06:26 AM    BUN 26 07/12/2024 06:26 AM    CREATININE 0.9 07/12/2024 06:26 AM    CALCIUM 9.1 07/12/2024 06:26 AM    LABGLOM 70 07/12/2024 06:26 AM    LABGLOM >60 12/18/2023 02:00 PM    GLUCOSE 193 07/12/2024 06:26 AM    GLUCOSE 104 11/23/2010 09:48 AM     PT/INR:    Lab Results   Component Value Date/Time    PROTIME 13.0 07/10/2024 03:51 AM    INR 1.2 07/10/2024 03:51 AM     Troponin:  No results found for: \"TROPONINI\"    No results for input(s): \"LABURIN\" in the last 72 hours.  No results for input(s): \"BC\" in the last 72 hours.  No results for input(s): \"BLOODCULT2\" in the last 72 hours.      Current Facility-Administered Medications:     ipratropium 0.5 mg-albuterol 2.5 mg (DUONEB) nebulizer solution 1 Dose, 1 Dose, Inhalation, Q4H PRN, Art Blue MD    enoxaparin (LOVENOX) injection 40 mg, 40 mg, SubCUTAneous, Daily, Mar Salas, APRN - CNP, 40 mg at 07/12/24 0819    amiodarone

## 2024-07-12 NOTE — PLAN OF CARE
Problem: Chronic Conditions and Co-morbidities  Goal: Patient's chronic conditions and co-morbidity symptoms are monitored and maintained or improved  7/12/2024 1927 by Sussy Bingham, RN  Outcome: Progressing     Problem: Safety - Adult  Goal: Free from fall injury  7/12/2024 1927 by Sussy Bingham, RN  Outcome: Progressing     Problem: Pain  Goal: Verbalizes/displays adequate comfort level or baseline comfort level  Outcome: Progressing     Problem: Discharge Planning  Goal: Discharge to home or other facility with appropriate resources  Outcome: Progressing     Problem: Metabolic/Fluid and Electrolytes - Adult  Goal: Electrolytes maintained within normal limits  Outcome: Progressing     Problem: Hematologic - Adult  Goal: Maintains hematologic stability  Outcome: Progressing     Problem: Neurosensory - Adult  Goal: Achieves stable or improved neurological status  Outcome: Progressing

## 2024-07-12 NOTE — CARE COORDINATION
7/12. POD#3 VSS O2 cont at 5L. H/H 9.2/28.9 IV zosyn. Discharge plan is home with son and [facility] home care when medically stable. Cherise Prado RN

## 2024-07-12 NOTE — PROGRESS NOTES
POD#3 Awake, alert. No complaints. Denies CP, palpitations, SOB at rest, dizziness/lightheadedness.    Vitals:    07/11/24 2343 07/12/24 0344 07/12/24 0648 07/12/24 0753   BP: 117/81 (!) 125/49  124/61   Pulse: 77 74  75   Resp: 18 20  20   Temp: 96.8 °F (36 °C) 97.1 °F (36.2 °C)  97.6 °F (36.4 °C)   TempSrc: Temporal Temporal  Temporal   SpO2: 93% 95%  96%   Weight:   97.1 kg (214 lb 1.6 oz)    Height:         O2: 5L/NC      Intake/Output Summary (Last 24 hours) at 7/12/2024 1105  Last data filed at 7/12/2024 0547  Gross per 24 hour   Intake 120 ml   Output 1140 ml   Net -1020 ml       UO: 450mL/8hr   CT output:     Pleural: 20mL/8hr (150mL/24hrs)      Recent Labs     07/10/24  0351 07/11/24  0629 07/12/24  0626   WBC 14.1* 17.1* 15.8*   HGB 10.0* 9.9* 9.2*   HCT 29.7* 31.3* 28.9*    412 359      Recent Labs     07/11/24  0629 07/11/24  1528 07/12/24  0626   BUN 20 25* 26*   CREATININE 1.1* 1.1* 0.9         Telemetry: SR      PE  Cardiac: RRR  Lungs: decreased bases  Chest incision with intact TONY DSD. Sternum stable. Surgical bra on. Prior chest tube site incisions C/D/I, no erythema with intact sutures. Chest tubes x 1 and Epicardial pacing wires present and secure.   Abd: Soft, nontender, +BS, +flatus  Ext: Incisions C/D/I, approximated, no erythema, + edema              A/P: POD# 3    1. CAD/UA  --Stable s/p urgent sternotomy, urgent CABG x3 (LIMA-LAD, SVG-RCA, SVG-diag), EVH, left atrial appendage ligation with 35mm Atriclip on 7/9/2024  --Post op KIARA reveals 60% EF  --DAPT/statin/BB  --reinforce sternal precautions  --continue epicardial pacing wires  -- Remaining Left Pleural CT without significant draining or airleak, will remove.  Chest tube(s) removed without difficulty. Patient tolerated well        2. Expected acute blood loss anemia secondary to open heart surgery  --stable        3. NSR  --continue BB with hold parameters  --initiate oral amiodarone for afib prophylaxis--with plans to taper on  then  --Encouraged continued increase in oral intake and activity.         14. Expected deconditioning in the setting following surgery  --Increase activity as tolerated  --PT/OT           DVT prophylaxis:  --continue bilateral knee high DEANNE hose  --continue PCDs  --continue progressive ambulation  -- lovenox for dvt prophylaxis and continue knee high DEANNE hose/pcds/progressive ambulation        Dispo: home vs. Rehab pending progression in activity level         This patient's case and care plan was discussed with the attending surgeon

## 2024-07-12 NOTE — PLAN OF CARE
Problem: Chronic Conditions and Co-morbidities  Goal: Patient's chronic conditions and co-morbidity symptoms are monitored and maintained or improved  7/11/2024 2006 by Sussy Bingham RN  Outcome: Progressing  7/11/2024 1252 by Enrrique White RN  Outcome: Progressing     Problem: Safety - Adult  Goal: Free from fall injury  7/11/2024 2006 by Sussy Bingham RN  Outcome: Progressing  7/11/2024 1252 by Enrrique White RN  Outcome: Progressing     Problem: ABCDS Injury Assessment  Goal: Absence of physical injury  Recent Flowsheet Documentation  Taken 7/11/2024 2006 by Sussy Bingham RN  Absence of Physical Injury: Implement safety measures based on patient assessment  7/11/2024 1252 by Enrrique White RN  Outcome: Progressing     Problem: Pain  Goal: Verbalizes/displays adequate comfort level or baseline comfort level  7/11/2024 2006 by Sussy Bingham RN  Outcome: Progressing  7/11/2024 1252 by Enrrique White RN  Outcome: Progressing     Problem: Discharge Planning  Goal: Discharge to home or other facility with appropriate resources  7/11/2024 2006 by Sussy Bingham RN  Outcome: Progressing  7/11/2024 1252 by Enrrique White RN  Outcome: Progressing     Problem: Metabolic/Fluid and Electrolytes - Adult  Goal: Electrolytes maintained within normal limits  7/11/2024 2006 by Sussy Bingham RN  Outcome: Progressing  7/11/2024 1252 by Enrrique White RN  Outcome: Progressing  Goal: Hemodynamic stability and optimal renal function maintained  7/11/2024 1252 by Enrrique White RN  Outcome: Progressing  Goal: Glucose maintained within prescribed range  7/11/2024 1252 by Enrrique White RN  Outcome: Progressing     Problem: Hematologic - Adult  Goal: Maintains hematologic stability  7/11/2024 1252 by Enrrique White RN  Outcome: Progressing     Problem: Neurosensory - Adult  Goal: Achieves stable or improved neurological status  7/11/2024 1252 by Enrrique White RN  Outcome: Progressing     Problem: Cardiovascular -

## 2024-07-13 ENCOUNTER — APPOINTMENT (OUTPATIENT)
Dept: GENERAL RADIOLOGY | Age: 64
DRG: 233 | End: 2024-07-13
Payer: COMMERCIAL

## 2024-07-13 LAB
ANION GAP SERPL CALCULATED.3IONS-SCNC: 11 MMOL/L (ref 7–16)
BUN SERPL-MCNC: 38 MG/DL (ref 6–23)
CALCIUM SERPL-MCNC: 8.7 MG/DL (ref 8.6–10.2)
CHLORIDE SERPL-SCNC: 102 MMOL/L (ref 98–107)
CO2 SERPL-SCNC: 24 MMOL/L (ref 22–29)
CREAT SERPL-MCNC: 1.1 MG/DL (ref 0.5–1)
ERYTHROCYTE [DISTWIDTH] IN BLOOD BY AUTOMATED COUNT: 12.9 % (ref 11.5–15)
GFR, ESTIMATED: 58 ML/MIN/1.73M2
GLUCOSE BLD-MCNC: 165 MG/DL (ref 74–99)
GLUCOSE BLD-MCNC: 223 MG/DL (ref 74–99)
GLUCOSE BLD-MCNC: 226 MG/DL (ref 74–99)
GLUCOSE BLD-MCNC: 235 MG/DL (ref 74–99)
GLUCOSE SERPL-MCNC: 156 MG/DL (ref 74–99)
HCT VFR BLD AUTO: 27.8 % (ref 34–48)
HGB BLD-MCNC: 9.1 G/DL (ref 11.5–15.5)
MAGNESIUM SERPL-MCNC: 2.4 MG/DL (ref 1.6–2.6)
MCH RBC QN AUTO: 42.1 PG (ref 26–35)
MCHC RBC AUTO-ENTMCNC: 32.7 G/DL (ref 32–34.5)
MCV RBC AUTO: 128.7 FL (ref 80–99.9)
PLATELET # BLD AUTO: 400 K/UL (ref 130–450)
PMV BLD AUTO: 10.4 FL (ref 7–12)
POTASSIUM SERPL-SCNC: 4.4 MMOL/L (ref 3.5–5)
RBC # BLD AUTO: 2.16 M/UL (ref 3.5–5.5)
SODIUM SERPL-SCNC: 137 MMOL/L (ref 132–146)
WBC OTHER # BLD: 16.6 K/UL (ref 4.5–11.5)

## 2024-07-13 PROCEDURE — 6370000000 HC RX 637 (ALT 250 FOR IP): Performed by: NURSE PRACTITIONER

## 2024-07-13 PROCEDURE — 36415 COLL VENOUS BLD VENIPUNCTURE: CPT

## 2024-07-13 PROCEDURE — 82962 GLUCOSE BLOOD TEST: CPT

## 2024-07-13 PROCEDURE — 6370000000 HC RX 637 (ALT 250 FOR IP): Performed by: PHYSICIAN ASSISTANT

## 2024-07-13 PROCEDURE — 71045 X-RAY EXAM CHEST 1 VIEW: CPT

## 2024-07-13 PROCEDURE — 2700000000 HC OXYGEN THERAPY PER DAY

## 2024-07-13 PROCEDURE — 6360000002 HC RX W HCPCS: Performed by: NURSE PRACTITIONER

## 2024-07-13 PROCEDURE — 2140000000 HC CCU INTERMEDIATE R&B

## 2024-07-13 PROCEDURE — 6370000000 HC RX 637 (ALT 250 FOR IP): Performed by: INTERNAL MEDICINE

## 2024-07-13 PROCEDURE — 83735 ASSAY OF MAGNESIUM: CPT

## 2024-07-13 PROCEDURE — 93798 PHYS/QHP OP CAR RHAB W/ECG: CPT

## 2024-07-13 PROCEDURE — 2580000003 HC RX 258: Performed by: NURSE PRACTITIONER

## 2024-07-13 PROCEDURE — 85027 COMPLETE CBC AUTOMATED: CPT

## 2024-07-13 PROCEDURE — 80048 BASIC METABOLIC PNL TOTAL CA: CPT

## 2024-07-13 RX ADMIN — SENNOSIDES AND DOCUSATE SODIUM 1 TABLET: 50; 8.6 TABLET ORAL at 19:57

## 2024-07-13 RX ADMIN — Medication 500 MG: at 19:57

## 2024-07-13 RX ADMIN — METOPROLOL TARTRATE 12.5 MG: 25 TABLET, FILM COATED ORAL at 09:19

## 2024-07-13 RX ADMIN — PIPERACILLIN AND TAZOBACTAM 3375 MG: 3; .375 INJECTION, POWDER, LYOPHILIZED, FOR SOLUTION INTRAVENOUS at 23:13

## 2024-07-13 RX ADMIN — AMIODARONE HYDROCHLORIDE 400 MG: 200 TABLET ORAL at 19:57

## 2024-07-13 RX ADMIN — HYDROXYUREA 500 MG: 500 CAPSULE ORAL at 00:18

## 2024-07-13 RX ADMIN — BETHANECHOL CHLORIDE 25 MG: 25 TABLET ORAL at 09:20

## 2024-07-13 RX ADMIN — OXYCODONE HYDROCHLORIDE 10 MG: 10 TABLET ORAL at 06:23

## 2024-07-13 RX ADMIN — ASPIRIN 81 MG: 81 TABLET, COATED ORAL at 09:19

## 2024-07-13 RX ADMIN — INSULIN LISPRO 4 UNITS: 100 INJECTION, SOLUTION INTRAVENOUS; SUBCUTANEOUS at 19:57

## 2024-07-13 RX ADMIN — FOLIC ACID 1 MG: 1 TABLET ORAL at 09:19

## 2024-07-13 RX ADMIN — OXYCODONE 5 MG: 5 TABLET ORAL at 19:56

## 2024-07-13 RX ADMIN — FERROUS SULFATE TAB 325 MG (65 MG ELEMENTAL FE) 325 MG: 325 (65 FE) TAB at 09:19

## 2024-07-13 RX ADMIN — PANTOPRAZOLE SODIUM 40 MG: 40 TABLET, DELAYED RELEASE ORAL at 06:23

## 2024-07-13 RX ADMIN — HYDROXYUREA 500 MG: 500 CAPSULE ORAL at 09:20

## 2024-07-13 RX ADMIN — MUPIROCIN: 20 OINTMENT TOPICAL at 09:20

## 2024-07-13 RX ADMIN — ATORVASTATIN CALCIUM 40 MG: 40 TABLET, FILM COATED ORAL at 19:57

## 2024-07-13 RX ADMIN — SENNOSIDES AND DOCUSATE SODIUM 1 TABLET: 50; 8.6 TABLET ORAL at 09:19

## 2024-07-13 RX ADMIN — Medication 400 MG: at 09:19

## 2024-07-13 RX ADMIN — INSULIN LISPRO 4 UNITS: 100 INJECTION, SOLUTION INTRAVENOUS; SUBCUTANEOUS at 09:30

## 2024-07-13 RX ADMIN — INSULIN LISPRO 8 UNITS: 100 INJECTION, SOLUTION INTRAVENOUS; SUBCUTANEOUS at 17:22

## 2024-07-13 RX ADMIN — SODIUM CHLORIDE, PRESERVATIVE FREE 10 ML: 5 INJECTION INTRAVENOUS at 19:58

## 2024-07-13 RX ADMIN — PIPERACILLIN AND TAZOBACTAM 3375 MG: 3; .375 INJECTION, POWDER, LYOPHILIZED, FOR SOLUTION INTRAVENOUS at 06:30

## 2024-07-13 RX ADMIN — BETHANECHOL CHLORIDE 25 MG: 25 TABLET ORAL at 15:23

## 2024-07-13 RX ADMIN — METOPROLOL TARTRATE 12.5 MG: 25 TABLET, FILM COATED ORAL at 19:57

## 2024-07-13 RX ADMIN — INSULIN LISPRO 8 UNITS: 100 INJECTION, SOLUTION INTRAVENOUS; SUBCUTANEOUS at 12:09

## 2024-07-13 RX ADMIN — INSULIN GLARGINE 24 UNITS: 100 INJECTION, SOLUTION SUBCUTANEOUS at 19:57

## 2024-07-13 RX ADMIN — HYDROXYUREA 500 MG: 500 CAPSULE ORAL at 19:57

## 2024-07-13 RX ADMIN — FERROUS SULFATE TAB 325 MG (65 MG ELEMENTAL FE) 325 MG: 325 (65 FE) TAB at 17:21

## 2024-07-13 RX ADMIN — BISACODYL 5 MG: 5 TABLET, COATED ORAL at 09:19

## 2024-07-13 RX ADMIN — BETHANECHOL CHLORIDE 25 MG: 25 TABLET ORAL at 19:57

## 2024-07-13 RX ADMIN — OXYCODONE HYDROCHLORIDE 10 MG: 10 TABLET ORAL at 01:05

## 2024-07-13 RX ADMIN — PIPERACILLIN AND TAZOBACTAM 3375 MG: 3; .375 INJECTION, POWDER, LYOPHILIZED, FOR SOLUTION INTRAVENOUS at 15:22

## 2024-07-13 RX ADMIN — MAGNESIUM HYDROXIDE 30 ML: 400 SUSPENSION ORAL at 09:18

## 2024-07-13 RX ADMIN — Medication 500 MG: at 09:19

## 2024-07-13 RX ADMIN — ENOXAPARIN SODIUM 40 MG: 100 INJECTION SUBCUTANEOUS at 09:18

## 2024-07-13 RX ADMIN — AMIODARONE HYDROCHLORIDE 400 MG: 200 TABLET ORAL at 09:18

## 2024-07-13 RX ADMIN — CLOPIDOGREL BISULFATE 75 MG: 75 TABLET ORAL at 09:19

## 2024-07-13 RX ADMIN — MUPIROCIN: 20 OINTMENT TOPICAL at 19:57

## 2024-07-13 RX ADMIN — PIPERACILLIN AND TAZOBACTAM 3375 MG: 3; .375 INJECTION, POWDER, LYOPHILIZED, FOR SOLUTION INTRAVENOUS at 00:24

## 2024-07-13 ASSESSMENT — PAIN DESCRIPTION - ORIENTATION
ORIENTATION: MID

## 2024-07-13 ASSESSMENT — PAIN SCALES - GENERAL
PAINLEVEL_OUTOF10: 5
PAINLEVEL_OUTOF10: 8
PAINLEVEL_OUTOF10: 7
PAINLEVEL_OUTOF10: 0
PAINLEVEL_OUTOF10: 2

## 2024-07-13 ASSESSMENT — PAIN DESCRIPTION - DESCRIPTORS
DESCRIPTORS: ACHING;DISCOMFORT;DULL
DESCRIPTORS: ACHING;DISCOMFORT;SORE
DESCRIPTORS: ACHING;DISCOMFORT;DULL

## 2024-07-13 ASSESSMENT — PAIN - FUNCTIONAL ASSESSMENT: PAIN_FUNCTIONAL_ASSESSMENT: PREVENTS OR INTERFERES SOME ACTIVE ACTIVITIES AND ADLS

## 2024-07-13 ASSESSMENT — PAIN DESCRIPTION - LOCATION
LOCATION: CHEST
LOCATION: CHEST;INCISION;STERNUM
LOCATION: CHEST

## 2024-07-13 ASSESSMENT — PAIN SCALES - WONG BAKER
WONGBAKER_NUMERICALRESPONSE: HURTS A LITTLE BIT
WONGBAKER_NUMERICALRESPONSE: NO HURT

## 2024-07-13 NOTE — PATIENT CARE CONFERENCE
[facility] Quality Flow/Interdisciplinary Rounds Progress Note        Quality Flow Rounds held on July 13, 2024    Disciplines Attending:  Bedside Nurse and Nursing Unit Leadership    Sherie Guerrero was admitted on 7/1/2024  7:56 PM    Anticipated Discharge Date:       Disposition:    Joe Score:  Joe Scale Score: 20    Readmission Risk              Risk of Unplanned Readmission:  22           Discussed patient goal for the day, patient clinical progression, and barriers to discharge.  The following Goal(s) of the Day/Commitment(s) have been identified:  Labs - Report Results and ambulate in nick at least 3 times today      Flor Mcdonough RN  July 13, 2024

## 2024-07-13 NOTE — PLAN OF CARE
Problem: Chronic Conditions and Co-morbidities  Goal: Patient's chronic conditions and co-morbidity symptoms are monitored and maintained or improved  7/13/2024 0755 by Nery Kelly RN  Outcome: Progressing     Problem: Safety - Adult  Goal: Free from fall injury  7/13/2024 0755 by Nery Kelly RN  Outcome: Progressing     Problem: ABCDS Injury Assessment  Goal: Absence of physical injury  Outcome: Progressing  Flowsheets (Taken 7/12/2024 1927 by Sussy Bingham, RN)  Absence of Physical Injury: Implement safety measures based on patient assessment     Problem: Pain  Goal: Verbalizes/displays adequate comfort level or baseline comfort level  7/13/2024 0755 by Nery Kelly RN  Outcome: Progressing     Problem: Discharge Planning  Goal: Discharge to home or other facility with appropriate resources  7/13/2024 0755 by Nery Kelly RN  Outcome: Progressing     Problem: Metabolic/Fluid and Electrolytes - Adult  Goal: Electrolytes maintained within normal limits  7/13/2024 0755 by Nery Kelly RN  Outcome: Progressing     Problem: Metabolic/Fluid and Electrolytes - Adult  Goal: Hemodynamic stability and optimal renal function maintained  Outcome: Progressing     Problem: Metabolic/Fluid and Electrolytes - Adult  Goal: Glucose maintained within prescribed range  Outcome: Progressing     Problem: Hematologic - Adult  Goal: Maintains hematologic stability  7/13/2024 0755 by Nery Kelly RN  Outcome: Progressing

## 2024-07-13 NOTE — PROGRESS NOTES
POD#4 Awake, alert. No complaints. Denies CP, palpitations, SOB at rest, dizziness/lightheadedness.    Vitals:    07/13/24 0600 07/13/24 0644 07/13/24 0803 07/13/24 0950   BP:   (!) 120/58    Pulse:   79    Resp:   17    Temp:   97.1 °F (36.2 °C)    TempSrc:   Temporal    SpO2:   95% (!) 87%   Weight: 96.3 kg (212 lb 3.2 oz) 96.2 kg (212 lb)     Height:         O2: 3L/NC      Intake/Output Summary (Last 24 hours) at 7/13/2024 1029  Last data filed at 7/13/2024 0620  Gross per 24 hour   Intake 240 ml   Output 1200 ml   Net -960 ml         +BM on 7/12    UO: 400mL/8hr       Recent Labs     07/11/24  0629 07/12/24  0626 07/13/24  0608   WBC 17.1* 15.8* 16.6*   HGB 9.9* 9.2* 9.1*   HCT 31.3* 28.9* 27.8*    359 400      Recent Labs     07/11/24  1528 07/12/24  0626 07/13/24  0608   BUN 25* 26* 38*   CREATININE 1.1* 0.9 1.1*         Telemetry: SR      PE  Cardiac: RRR  Lungs: decreased bases  Chest incision with intact TONY DSD. Sternum stable. Surgical bra on. Prior chest tube site incisions C/D/I, no erythema with intact sutures.  Epicardial pacing wires present and secure.   Abd: Soft, nontender, +BS, +flatus  Ext: Incisions C/D/I, approximated, no erythema, + edema           A/P: POD# 4      1. CAD/UA  --Stable s/p urgent sternotomy, urgent CABG x3 (LIMA-LAD, SVG-RCA, SVG-diag), EVH, left atrial appendage ligation with 35mm Atriclip on 7/9/2024  --Post op KIARA reveals 60% EF  --DAPT/statin/BB  --reinforce sternal precautions  --continue epicardial pacing wires  -- All CTs out       2. Expected acute blood loss anemia secondary to open heart surgery  --stable- hgb 9.1        3. NSR  --continue BB with hold parameters  --initiate oral amiodarone for afib prophylaxis--with plans to taper on dc- on Amio 400mg PO bid         4. TRENT  --Scr 1.1 - yeterday 0.9   --SPA 7/11  --reinsert urinary catheter for retention  -- hold off on diuresis today and monitor         5. Expected acute pulmonary insufficiency in the

## 2024-07-13 NOTE — PROGRESS NOTES
Subjective:    Chief complaint:    Family at bedside   Hernandez remains for an episode of urinary retention after initial VT  During of hernandez per CTS   Answered all questions to family   Patient was up moving today and HR was a bit elevated to 113 they tell me and pt was mildly short of breath during this     Objective:    /62   Pulse (!) 108   Temp 97.1 °F (36.2 °C) (Temporal)   Resp 16   Ht 1.651 m (5' 5\")   Wt 96.2 kg (212 lb)   SpO2 90% Comment: 95 before, 90 during, 92 after amb up in chair  BMI 35.28 kg/m²   General : Awake ,alert,no distress.  Heart:  RRR, no murmurs, gallops, or rubs.  Lungs:  CTA bilaterally, no wheeze, rales or rhonchi  Abd: bowel sounds present, nontender, nondistended, no masses  Extrem:  No clubbing, cyanosis, or edema    CBC:   Lab Results   Component Value Date/Time    WBC 16.6 07/13/2024 06:08 AM    RBC 2.16 07/13/2024 06:08 AM    HGB 9.1 07/13/2024 06:08 AM    HCT 27.8 07/13/2024 06:08 AM    .7 07/13/2024 06:08 AM    MCH 42.1 07/13/2024 06:08 AM    MCHC 32.7 07/13/2024 06:08 AM    RDW 12.9 07/13/2024 06:08 AM     07/13/2024 06:08 AM    MPV 10.4 07/13/2024 06:08 AM     BMP:    Lab Results   Component Value Date/Time     07/13/2024 06:08 AM    K 4.4 07/13/2024 06:08 AM     07/13/2024 06:08 AM    CO2 24 07/13/2024 06:08 AM    BUN 38 07/13/2024 06:08 AM    CREATININE 1.1 07/13/2024 06:08 AM    CALCIUM 8.7 07/13/2024 06:08 AM    LABGLOM 58 07/13/2024 06:08 AM    LABGLOM >60 12/18/2023 02:00 PM    GLUCOSE 156 07/13/2024 06:08 AM    GLUCOSE 104 11/23/2010 09:48 AM     PT/INR:    Lab Results   Component Value Date/Time    PROTIME 13.0 07/10/2024 03:51 AM    INR 1.2 07/10/2024 03:51 AM     Troponin:  No results found for: \"TROPONINI\"    No results for input(s): \"LABURIN\" in the last 72 hours.  No results for input(s): \"BC\" in the last 72 hours.  No results for input(s): \"BLOODCULT2\" in the last 72 hours.      Current Facility-Administered Medications:

## 2024-07-14 ENCOUNTER — APPOINTMENT (OUTPATIENT)
Dept: GENERAL RADIOLOGY | Age: 64
DRG: 233 | End: 2024-07-14
Payer: COMMERCIAL

## 2024-07-14 LAB
ANION GAP SERPL CALCULATED.3IONS-SCNC: 10 MMOL/L (ref 7–16)
BUN SERPL-MCNC: 34 MG/DL (ref 6–23)
CALCIUM SERPL-MCNC: 8.6 MG/DL (ref 8.6–10.2)
CHLORIDE SERPL-SCNC: 103 MMOL/L (ref 98–107)
CO2 SERPL-SCNC: 25 MMOL/L (ref 22–29)
CREAT SERPL-MCNC: 1 MG/DL (ref 0.5–1)
ERYTHROCYTE [DISTWIDTH] IN BLOOD BY AUTOMATED COUNT: 12.8 % (ref 11.5–15)
GFR, ESTIMATED: 63 ML/MIN/1.73M2
GLUCOSE BLD-MCNC: 177 MG/DL (ref 74–99)
GLUCOSE BLD-MCNC: 199 MG/DL (ref 74–99)
GLUCOSE BLD-MCNC: 203 MG/DL (ref 74–99)
GLUCOSE BLD-MCNC: 247 MG/DL (ref 74–99)
GLUCOSE SERPL-MCNC: 168 MG/DL (ref 74–99)
HCT VFR BLD AUTO: 27.8 % (ref 34–48)
HGB BLD-MCNC: 9.3 G/DL (ref 11.5–15.5)
MAGNESIUM SERPL-MCNC: 2.4 MG/DL (ref 1.6–2.6)
MCH RBC QN AUTO: 42.3 PG (ref 26–35)
MCHC RBC AUTO-ENTMCNC: 33.5 G/DL (ref 32–34.5)
MCV RBC AUTO: 126.4 FL (ref 80–99.9)
PLATELET # BLD AUTO: 436 K/UL (ref 130–450)
PMV BLD AUTO: 10.4 FL (ref 7–12)
POTASSIUM SERPL-SCNC: 4.7 MMOL/L (ref 3.5–5)
RBC # BLD AUTO: 2.2 M/UL (ref 3.5–5.5)
SODIUM SERPL-SCNC: 138 MMOL/L (ref 132–146)
WBC OTHER # BLD: 14.4 K/UL (ref 4.5–11.5)

## 2024-07-14 PROCEDURE — 82962 GLUCOSE BLOOD TEST: CPT

## 2024-07-14 PROCEDURE — 6370000000 HC RX 637 (ALT 250 FOR IP): Performed by: PHYSICIAN ASSISTANT

## 2024-07-14 PROCEDURE — 2140000000 HC CCU INTERMEDIATE R&B

## 2024-07-14 PROCEDURE — 2580000003 HC RX 258: Performed by: NURSE PRACTITIONER

## 2024-07-14 PROCEDURE — 93798 PHYS/QHP OP CAR RHAB W/ECG: CPT

## 2024-07-14 PROCEDURE — 83735 ASSAY OF MAGNESIUM: CPT

## 2024-07-14 PROCEDURE — 6370000000 HC RX 637 (ALT 250 FOR IP): Performed by: INTERNAL MEDICINE

## 2024-07-14 PROCEDURE — 6360000002 HC RX W HCPCS: Performed by: NURSE PRACTITIONER

## 2024-07-14 PROCEDURE — 6370000000 HC RX 637 (ALT 250 FOR IP): Performed by: NURSE PRACTITIONER

## 2024-07-14 PROCEDURE — 2580000003 HC RX 258: Performed by: PHYSICIAN ASSISTANT

## 2024-07-14 PROCEDURE — 80048 BASIC METABOLIC PNL TOTAL CA: CPT

## 2024-07-14 PROCEDURE — 74018 RADEX ABDOMEN 1 VIEW: CPT

## 2024-07-14 PROCEDURE — 2700000000 HC OXYGEN THERAPY PER DAY

## 2024-07-14 PROCEDURE — 36415 COLL VENOUS BLD VENIPUNCTURE: CPT

## 2024-07-14 PROCEDURE — 6360000002 HC RX W HCPCS: Performed by: PHYSICIAN ASSISTANT

## 2024-07-14 PROCEDURE — 85027 COMPLETE CBC AUTOMATED: CPT

## 2024-07-14 RX ADMIN — INSULIN LISPRO 8 UNITS: 100 INJECTION, SOLUTION INTRAVENOUS; SUBCUTANEOUS at 11:43

## 2024-07-14 RX ADMIN — ONDANSETRON 4 MG: 2 INJECTION INTRAMUSCULAR; INTRAVENOUS at 17:36

## 2024-07-14 RX ADMIN — Medication 400 MG: at 08:30

## 2024-07-14 RX ADMIN — ENOXAPARIN SODIUM 40 MG: 100 INJECTION SUBCUTANEOUS at 08:31

## 2024-07-14 RX ADMIN — MAGNESIUM HYDROXIDE 30 ML: 400 SUSPENSION ORAL at 08:31

## 2024-07-14 RX ADMIN — Medication 500 MG: at 08:31

## 2024-07-14 RX ADMIN — METOPROLOL TARTRATE 25 MG: 25 TABLET, FILM COATED ORAL at 21:33

## 2024-07-14 RX ADMIN — AMIODARONE HYDROCHLORIDE 1 MG/MIN: 50 INJECTION, SOLUTION INTRAVENOUS at 08:39

## 2024-07-14 RX ADMIN — FERROUS SULFATE TAB 325 MG (65 MG ELEMENTAL FE) 325 MG: 325 (65 FE) TAB at 08:31

## 2024-07-14 RX ADMIN — AMIODARONE HYDROCHLORIDE 0.5 MG/MIN: 50 INJECTION, SOLUTION INTRAVENOUS at 14:22

## 2024-07-14 RX ADMIN — PIPERACILLIN AND TAZOBACTAM 3375 MG: 3; .375 INJECTION, POWDER, LYOPHILIZED, FOR SOLUTION INTRAVENOUS at 14:16

## 2024-07-14 RX ADMIN — INSULIN LISPRO 4 UNITS: 100 INJECTION, SOLUTION INTRAVENOUS; SUBCUTANEOUS at 08:29

## 2024-07-14 RX ADMIN — FERROUS SULFATE TAB 325 MG (65 MG ELEMENTAL FE) 325 MG: 325 (65 FE) TAB at 16:38

## 2024-07-14 RX ADMIN — BISACODYL 5 MG: 5 TABLET, COATED ORAL at 08:30

## 2024-07-14 RX ADMIN — METOPROLOL TARTRATE 25 MG: 25 TABLET, FILM COATED ORAL at 08:30

## 2024-07-14 RX ADMIN — ASPIRIN 81 MG: 81 TABLET, COATED ORAL at 08:30

## 2024-07-14 RX ADMIN — CLOPIDOGREL BISULFATE 75 MG: 75 TABLET ORAL at 08:30

## 2024-07-14 RX ADMIN — ATORVASTATIN CALCIUM 40 MG: 40 TABLET, FILM COATED ORAL at 21:33

## 2024-07-14 RX ADMIN — PANTOPRAZOLE SODIUM 40 MG: 40 TABLET, DELAYED RELEASE ORAL at 06:11

## 2024-07-14 RX ADMIN — METFORMIN HYDROCHLORIDE 500 MG: 500 TABLET ORAL at 08:30

## 2024-07-14 RX ADMIN — BETHANECHOL CHLORIDE 25 MG: 25 TABLET ORAL at 21:33

## 2024-07-14 RX ADMIN — BETHANECHOL CHLORIDE 25 MG: 25 TABLET ORAL at 08:31

## 2024-07-14 RX ADMIN — OXYCODONE 5 MG: 5 TABLET ORAL at 02:02

## 2024-07-14 RX ADMIN — PIPERACILLIN AND TAZOBACTAM 3375 MG: 3; .375 INJECTION, POWDER, LYOPHILIZED, FOR SOLUTION INTRAVENOUS at 06:12

## 2024-07-14 RX ADMIN — BETHANECHOL CHLORIDE 25 MG: 25 TABLET ORAL at 14:23

## 2024-07-14 RX ADMIN — OXYCODONE HYDROCHLORIDE 10 MG: 10 TABLET ORAL at 21:37

## 2024-07-14 RX ADMIN — PIPERACILLIN AND TAZOBACTAM 3375 MG: 3; .375 INJECTION, POWDER, LYOPHILIZED, FOR SOLUTION INTRAVENOUS at 23:50

## 2024-07-14 RX ADMIN — SENNOSIDES AND DOCUSATE SODIUM 1 TABLET: 50; 8.6 TABLET ORAL at 08:30

## 2024-07-14 RX ADMIN — HYDROXYUREA 500 MG: 500 CAPSULE ORAL at 21:35

## 2024-07-14 RX ADMIN — OXYCODONE 5 MG: 5 TABLET ORAL at 07:37

## 2024-07-14 RX ADMIN — Medication 500 MG: at 21:33

## 2024-07-14 RX ADMIN — SODIUM CHLORIDE, PRESERVATIVE FREE 10 ML: 5 INJECTION INTRAVENOUS at 21:35

## 2024-07-14 RX ADMIN — FOLIC ACID 1 MG: 1 TABLET ORAL at 08:30

## 2024-07-14 RX ADMIN — HYDROXYUREA 500 MG: 500 CAPSULE ORAL at 08:31

## 2024-07-14 RX ADMIN — METFORMIN HYDROCHLORIDE 500 MG: 500 TABLET ORAL at 16:38

## 2024-07-14 RX ADMIN — INSULIN GLARGINE 24 UNITS: 100 INJECTION, SOLUTION SUBCUTANEOUS at 21:35

## 2024-07-14 RX ADMIN — AMIODARONE HYDROCHLORIDE 150 MG: 50 INJECTION, SOLUTION INTRAVENOUS at 07:48

## 2024-07-14 RX ADMIN — POLYETHYLENE GLYCOL 3350 17 G: 17 POWDER, FOR SOLUTION ORAL at 08:31

## 2024-07-14 RX ADMIN — MUPIROCIN: 20 OINTMENT TOPICAL at 08:41

## 2024-07-14 RX ADMIN — INSULIN LISPRO 8 UNITS: 100 INJECTION, SOLUTION INTRAVENOUS; SUBCUTANEOUS at 16:38

## 2024-07-14 RX ADMIN — SODIUM CHLORIDE, PRESERVATIVE FREE 10 ML: 5 INJECTION INTRAVENOUS at 08:41

## 2024-07-14 ASSESSMENT — PAIN SCALES - GENERAL
PAINLEVEL_OUTOF10: 7
PAINLEVEL_OUTOF10: 5
PAINLEVEL_OUTOF10: 5
PAINLEVEL_OUTOF10: 0
PAINLEVEL_OUTOF10: 4
PAINLEVEL_OUTOF10: 0

## 2024-07-14 ASSESSMENT — PAIN DESCRIPTION - ORIENTATION
ORIENTATION: MID

## 2024-07-14 ASSESSMENT — PAIN DESCRIPTION - DESCRIPTORS
DESCRIPTORS: ACHING;DISCOMFORT;SORE

## 2024-07-14 ASSESSMENT — PAIN DESCRIPTION - LOCATION
LOCATION: CHEST;STERNUM;INCISION

## 2024-07-14 ASSESSMENT — PAIN - FUNCTIONAL ASSESSMENT
PAIN_FUNCTIONAL_ASSESSMENT: PREVENTS OR INTERFERES SOME ACTIVE ACTIVITIES AND ADLS

## 2024-07-14 NOTE — PLAN OF CARE
Problem: Chronic Conditions and Co-morbidities  Goal: Patient's chronic conditions and co-morbidity symptoms are monitored and maintained or improved  7/14/2024 1011 by Camelia Perez RN  Outcome: Progressing     Problem: Safety - Adult  Goal: Free from fall injury  7/14/2024 1011 by Camelia Perez RN  Outcome: Progressing     Problem: ABCDS Injury Assessment  Goal: Absence of physical injury  7/14/2024 1011 by Camelia Perez RN  Outcome: Progressing     Problem: Pain  Goal: Verbalizes/displays adequate comfort level or baseline comfort level  7/14/2024 1011 by Camelia Perez RN  Outcome: Progressing     Problem: Discharge Planning  Goal: Discharge to home or other facility with appropriate resources  7/14/2024 1011 by Camelia Perez RN  Outcome: Progressing     Problem: Metabolic/Fluid and Electrolytes - Adult  Goal: Electrolytes maintained within normal limits  Outcome: Progressing     Problem: Metabolic/Fluid and Electrolytes - Adult  Goal: Hemodynamic stability and optimal renal function maintained  Outcome: Progressing     Problem: Metabolic/Fluid and Electrolytes - Adult  Goal: Glucose maintained within prescribed range  Outcome: Progressing     Problem: Hematologic - Adult  Goal: Maintains hematologic stability  Outcome: Progressing     Problem: Neurosensory - Adult  Goal: Achieves stable or improved neurological status  Outcome: Progressing     Problem: Cardiovascular - Adult  Goal: Maintains optimal cardiac output and hemodynamic stability  Outcome: Progressing     Problem: Cardiovascular - Adult  Goal: Absence of cardiac dysrhythmias or at baseline  Outcome: Progressing     Problem: Respiratory - Adult  Goal: Achieves optimal ventilation and oxygenation  Outcome: Progressing     Problem: Gastrointestinal - Adult  Goal: Minimal or absence of nausea and vomiting  Outcome: Progressing     Problem: Gastrointestinal - Adult  Goal: Maintains or returns to baseline bowel

## 2024-07-14 NOTE — PROGRESS NOTES
NADJA Valenzuela notified in person of patient going into to Afib RVR this morning. Per MARINO Valenzuela to give amiodarone bolus and then start patient on gtt.

## 2024-07-14 NOTE — PROGRESS NOTES
Subjective:    Chief complaint:    Family at bedside   No new issues today   Has been out of bed several times no issues   Amio gtt now for a fib     Objective:    /62   Pulse 68   Temp 97.2 °F (36.2 °C) (Temporal)   Resp 17   Ht 1.651 m (5' 5\")   Wt 96.5 kg (212 lb 12.8 oz)   SpO2 (!) 88% Comment: 88 during amb on 1L, 90 on 2L in chair  BMI 35.41 kg/m²   General : Awake ,alert,no distress.  Heart:  RRR, no murmurs, gallops, or rubs.  Lungs:  CTA bilaterally, no wheeze, rales or rhonchi  Abd: bowel sounds present, nontender, nondistended, no masses  Extrem:  No clubbing, cyanosis, or edema    CBC:   Lab Results   Component Value Date/Time    WBC 14.4 2024 05:19 AM    RBC 2.20 2024 05:19 AM    HGB 9.3 2024 05:19 AM    HCT 27.8 2024 05:19 AM    .4 2024 05:19 AM    MCH 42.3 2024 05:19 AM    MCHC 33.5 2024 05:19 AM    RDW 12.8 2024 05:19 AM     2024 05:19 AM    MPV 10.4 2024 05:19 AM     BMP:    Lab Results   Component Value Date/Time     2024 05:19 AM    K 4.7 2024 05:19 AM     2024 05:19 AM    CO2 25 2024 05:19 AM    BUN 34 2024 05:19 AM    CREATININE 1.0 2024 05:19 AM    CALCIUM 8.6 2024 05:19 AM    LABGLOM 63 2024 05:19 AM    LABGLOM >60 2023 02:00 PM    GLUCOSE 168 2024 05:19 AM    GLUCOSE 104 2010 09:48 AM     PT/INR:    Lab Results   Component Value Date/Time    PROTIME 13.0 07/10/2024 03:51 AM    INR 1.2 07/10/2024 03:51 AM     Troponin:  No results found for: \"TROPONINI\"    No results for input(s): \"LABURIN\" in the last 72 hours.  No results for input(s): \"BC\" in the last 72 hours.  No results for input(s): \"BLOODCULT2\" in the last 72 hours.      Current Facility-Administered Medications:     [] amiodarone (CORDARONE) 450 mg in dextrose 5 % 250 mL infusion, 1 mg/min, IntraVENous, Continuous, Last Rate: 33.3 mL/hr at 24 0839, 1 mg/min at

## 2024-07-14 NOTE — PROGRESS NOTES
POD#5 Awake, alert. No complaints. Denies CP, palpitations, SOB at rest, dizziness/lightheadedness.    Afib RVR this AM     Vitals:    07/14/24 0356 07/14/24 0545 07/14/24 0645 07/14/24 0804   BP: (!) 143/61   (!) 117/56   Pulse: 85 (!) 117  (!) 108   Resp: 17   17   Temp: 97.5 °F (36.4 °C)   97.6 °F (36.4 °C)   TempSrc: Temporal   Temporal   SpO2: 94%   97%   Weight:   96.5 kg (212 lb 12.8 oz)    Height:         O2: 1L/NC      Intake/Output Summary (Last 24 hours) at 7/14/2024 0814  Last data filed at 7/14/2024 0645  Gross per 24 hour   Intake 420 ml   Output 1625 ml   Net -1205 ml         +BM on 7/12    UO: 625mL/8hr         Recent Labs     07/12/24  0626 07/13/24  0608 07/14/24  0519   WBC 15.8* 16.6* 14.4*   HGB 9.2* 9.1* 9.3*   HCT 28.9* 27.8* 27.8*    400 436      Recent Labs     07/12/24  0626 07/13/24  0608 07/14/24  0519   BUN 26* 38* 34*   CREATININE 0.9 1.1* 1.0         Telemetry: SR/afib       PE  Cardiac: RRR  Lungs: decreased bases  Chest incision with intact TONY DSD. Sternum stable. Surgical bra on. Prior chest tube site incisions C/D/I, no erythema with intact sutures.  Epicardial pacing wires present and secure.   Abd: Soft, nontender, +BS, +flatus  Ext: Incisions C/D/I, approximated, no erythema, + edema           A/P: POD# 5    1. CAD/UA  --Stable s/p urgent sternotomy, urgent CABG x3 (LIMA-LAD, SVG-RCA, SVG-diag), EVH, left atrial appendage ligation with 35mm Atriclip on 7/9/2024  --Post op KIARA reveals 60% EF  --DAPT/statin/BB- will cont plavix today,but if any further afib will need held for poss eliquis on DC   --reinforce sternal precautions  --continue epicardial pacing wires  -- All CTs out       2. Expected acute blood loss anemia secondary to open heart surgery  --stable- hgb 9.3        3. PAF  -- Afib RVR am of 7/14, amio bolus given and amio gtt initiated, hold PO amio while on gtt   --continue BB with hold parameters, up-titrate to Lopressor 25mg today 7/14  -- Cont Plavix as

## 2024-07-15 LAB
ANION GAP SERPL CALCULATED.3IONS-SCNC: 12 MMOL/L (ref 7–16)
BUN SERPL-MCNC: 28 MG/DL (ref 6–23)
CALCIUM SERPL-MCNC: 8.9 MG/DL (ref 8.6–10.2)
CHLORIDE SERPL-SCNC: 103 MMOL/L (ref 98–107)
CO2 SERPL-SCNC: 24 MMOL/L (ref 22–29)
CREAT SERPL-MCNC: 1 MG/DL (ref 0.5–1)
ERYTHROCYTE [DISTWIDTH] IN BLOOD BY AUTOMATED COUNT: 12.9 % (ref 11.5–15)
GFR, ESTIMATED: 63 ML/MIN/1.73M2
GLUCOSE BLD-MCNC: 141 MG/DL (ref 74–99)
GLUCOSE BLD-MCNC: 163 MG/DL (ref 74–99)
GLUCOSE BLD-MCNC: 168 MG/DL (ref 74–99)
GLUCOSE BLD-MCNC: 184 MG/DL (ref 74–99)
GLUCOSE SERPL-MCNC: 145 MG/DL (ref 74–99)
HCT VFR BLD AUTO: 28.1 % (ref 34–48)
HGB BLD-MCNC: 8.9 G/DL (ref 11.5–15.5)
MAGNESIUM SERPL-MCNC: 2.6 MG/DL (ref 1.6–2.6)
MCH RBC QN AUTO: 39.9 PG (ref 26–35)
MCHC RBC AUTO-ENTMCNC: 31.7 G/DL (ref 32–34.5)
MCV RBC AUTO: 126 FL (ref 80–99.9)
PLATELET # BLD AUTO: 476 K/UL (ref 130–450)
PMV BLD AUTO: 10.4 FL (ref 7–12)
POTASSIUM SERPL-SCNC: 4.7 MMOL/L (ref 3.5–5)
RBC # BLD AUTO: 2.23 M/UL (ref 3.5–5.5)
SODIUM SERPL-SCNC: 139 MMOL/L (ref 132–146)
WBC OTHER # BLD: 14.8 K/UL (ref 4.5–11.5)

## 2024-07-15 PROCEDURE — 6370000000 HC RX 637 (ALT 250 FOR IP): Performed by: INTERNAL MEDICINE

## 2024-07-15 PROCEDURE — 83735 ASSAY OF MAGNESIUM: CPT

## 2024-07-15 PROCEDURE — 6360000002 HC RX W HCPCS: Performed by: PHYSICIAN ASSISTANT

## 2024-07-15 PROCEDURE — 85027 COMPLETE CBC AUTOMATED: CPT

## 2024-07-15 PROCEDURE — 6370000000 HC RX 637 (ALT 250 FOR IP): Performed by: NURSE PRACTITIONER

## 2024-07-15 PROCEDURE — 2580000003 HC RX 258: Performed by: PHYSICIAN ASSISTANT

## 2024-07-15 PROCEDURE — 97530 THERAPEUTIC ACTIVITIES: CPT

## 2024-07-15 PROCEDURE — 93798 PHYS/QHP OP CAR RHAB W/ECG: CPT

## 2024-07-15 PROCEDURE — 80048 BASIC METABOLIC PNL TOTAL CA: CPT

## 2024-07-15 PROCEDURE — 36415 COLL VENOUS BLD VENIPUNCTURE: CPT

## 2024-07-15 PROCEDURE — 6360000002 HC RX W HCPCS: Performed by: NURSE PRACTITIONER

## 2024-07-15 PROCEDURE — 2580000003 HC RX 258: Performed by: NURSE PRACTITIONER

## 2024-07-15 PROCEDURE — 6370000000 HC RX 637 (ALT 250 FOR IP): Performed by: PHYSICIAN ASSISTANT

## 2024-07-15 PROCEDURE — 82962 GLUCOSE BLOOD TEST: CPT

## 2024-07-15 PROCEDURE — 2140000000 HC CCU INTERMEDIATE R&B

## 2024-07-15 PROCEDURE — 97535 SELF CARE MNGMENT TRAINING: CPT

## 2024-07-15 PROCEDURE — 2700000000 HC OXYGEN THERAPY PER DAY

## 2024-07-15 RX ORDER — FUROSEMIDE 10 MG/ML
20 INJECTION INTRAMUSCULAR; INTRAVENOUS ONCE
Status: COMPLETED | OUTPATIENT
Start: 2024-07-15 | End: 2024-07-15

## 2024-07-15 RX ORDER — METOPROLOL TARTRATE 50 MG/1
50 TABLET, FILM COATED ORAL 2 TIMES DAILY
Status: DISCONTINUED | OUTPATIENT
Start: 2024-07-15 | End: 2024-07-19 | Stop reason: HOSPADM

## 2024-07-15 RX ADMIN — Medication 500 MG: at 20:48

## 2024-07-15 RX ADMIN — CLOPIDOGREL BISULFATE 75 MG: 75 TABLET ORAL at 08:40

## 2024-07-15 RX ADMIN — HYDROXYUREA 500 MG: 500 CAPSULE ORAL at 20:48

## 2024-07-15 RX ADMIN — INSULIN LISPRO 4 UNITS: 100 INJECTION, SOLUTION INTRAVENOUS; SUBCUTANEOUS at 11:45

## 2024-07-15 RX ADMIN — METOPROLOL TARTRATE 50 MG: 50 TABLET, FILM COATED ORAL at 20:48

## 2024-07-15 RX ADMIN — PIPERACILLIN AND TAZOBACTAM 3375 MG: 3; .375 INJECTION, POWDER, LYOPHILIZED, FOR SOLUTION INTRAVENOUS at 14:17

## 2024-07-15 RX ADMIN — OXYCODONE 5 MG: 5 TABLET ORAL at 14:10

## 2024-07-15 RX ADMIN — INSULIN LISPRO 4 UNITS: 100 INJECTION, SOLUTION INTRAVENOUS; SUBCUTANEOUS at 08:38

## 2024-07-15 RX ADMIN — METFORMIN HYDROCHLORIDE 500 MG: 500 TABLET ORAL at 17:10

## 2024-07-15 RX ADMIN — AMIODARONE HYDROCHLORIDE 0.5 MG/MIN: 50 INJECTION, SOLUTION INTRAVENOUS at 04:00

## 2024-07-15 RX ADMIN — FERROUS SULFATE TAB 325 MG (65 MG ELEMENTAL FE) 325 MG: 325 (65 FE) TAB at 17:10

## 2024-07-15 RX ADMIN — FOLIC ACID 1 MG: 1 TABLET ORAL at 08:39

## 2024-07-15 RX ADMIN — ONDANSETRON 4 MG: 2 INJECTION INTRAMUSCULAR; INTRAVENOUS at 14:19

## 2024-07-15 RX ADMIN — BISACODYL 5 MG: 5 TABLET, COATED ORAL at 08:40

## 2024-07-15 RX ADMIN — ENOXAPARIN SODIUM 40 MG: 100 INJECTION SUBCUTANEOUS at 08:39

## 2024-07-15 RX ADMIN — PANTOPRAZOLE SODIUM 40 MG: 40 TABLET, DELAYED RELEASE ORAL at 07:05

## 2024-07-15 RX ADMIN — INSULIN LISPRO 4 UNITS: 100 INJECTION, SOLUTION INTRAVENOUS; SUBCUTANEOUS at 17:10

## 2024-07-15 RX ADMIN — AMIODARONE HYDROCHLORIDE 400 MG: 200 TABLET ORAL at 20:48

## 2024-07-15 RX ADMIN — BETHANECHOL CHLORIDE 25 MG: 25 TABLET ORAL at 20:48

## 2024-07-15 RX ADMIN — BETHANECHOL CHLORIDE 25 MG: 25 TABLET ORAL at 14:10

## 2024-07-15 RX ADMIN — SODIUM CHLORIDE, PRESERVATIVE FREE 10 ML: 5 INJECTION INTRAVENOUS at 20:48

## 2024-07-15 RX ADMIN — HYDROXYUREA 500 MG: 500 CAPSULE ORAL at 08:41

## 2024-07-15 RX ADMIN — AMIODARONE HYDROCHLORIDE 400 MG: 200 TABLET ORAL at 08:39

## 2024-07-15 RX ADMIN — SODIUM CHLORIDE, PRESERVATIVE FREE 10 ML: 5 INJECTION INTRAVENOUS at 08:40

## 2024-07-15 RX ADMIN — METFORMIN HYDROCHLORIDE 500 MG: 500 TABLET ORAL at 08:39

## 2024-07-15 RX ADMIN — INSULIN GLARGINE 24 UNITS: 100 INJECTION, SOLUTION SUBCUTANEOUS at 20:47

## 2024-07-15 RX ADMIN — OXYCODONE HYDROCHLORIDE 10 MG: 10 TABLET ORAL at 03:58

## 2024-07-15 RX ADMIN — FUROSEMIDE 20 MG: 10 INJECTION, SOLUTION INTRAMUSCULAR; INTRAVENOUS at 11:45

## 2024-07-15 RX ADMIN — PIPERACILLIN AND TAZOBACTAM 3375 MG: 3; .375 INJECTION, POWDER, LYOPHILIZED, FOR SOLUTION INTRAVENOUS at 07:10

## 2024-07-15 RX ADMIN — OXYCODONE HYDROCHLORIDE 10 MG: 10 TABLET ORAL at 20:46

## 2024-07-15 RX ADMIN — ATORVASTATIN CALCIUM 40 MG: 40 TABLET, FILM COATED ORAL at 20:48

## 2024-07-15 RX ADMIN — Medication 400 MG: at 08:39

## 2024-07-15 RX ADMIN — PIPERACILLIN AND TAZOBACTAM 3375 MG: 3; .375 INJECTION, POWDER, LYOPHILIZED, FOR SOLUTION INTRAVENOUS at 23:17

## 2024-07-15 RX ADMIN — FERROUS SULFATE TAB 325 MG (65 MG ELEMENTAL FE) 325 MG: 325 (65 FE) TAB at 08:39

## 2024-07-15 RX ADMIN — METOPROLOL TARTRATE 50 MG: 50 TABLET, FILM COATED ORAL at 08:40

## 2024-07-15 RX ADMIN — Medication 500 MG: at 08:39

## 2024-07-15 RX ADMIN — ASPIRIN 81 MG: 81 TABLET, COATED ORAL at 08:40

## 2024-07-15 RX ADMIN — BETHANECHOL CHLORIDE 25 MG: 25 TABLET ORAL at 08:40

## 2024-07-15 ASSESSMENT — PAIN DESCRIPTION - ORIENTATION
ORIENTATION: MID;LEFT
ORIENTATION: LEFT
ORIENTATION: MID

## 2024-07-15 ASSESSMENT — PAIN DESCRIPTION - DESCRIPTORS
DESCRIPTORS: ACHING;DISCOMFORT;SORE
DESCRIPTORS: ACHING;DISCOMFORT;TENDER;THROBBING
DESCRIPTORS: ACHING;DISCOMFORT;SORE

## 2024-07-15 ASSESSMENT — PAIN SCALES - GENERAL
PAINLEVEL_OUTOF10: 7
PAINLEVEL_OUTOF10: 0
PAINLEVEL_OUTOF10: 6
PAINLEVEL_OUTOF10: 8
PAINLEVEL_OUTOF10: 3
PAINLEVEL_OUTOF10: 5

## 2024-07-15 ASSESSMENT — PAIN DESCRIPTION - LOCATION
LOCATION: CHEST;INCISION;STERNUM
LOCATION: SHOULDER
LOCATION: SHOULDER;INCISION;STERNUM

## 2024-07-15 ASSESSMENT — PAIN - FUNCTIONAL ASSESSMENT
PAIN_FUNCTIONAL_ASSESSMENT: ACTIVITIES ARE NOT PREVENTED
PAIN_FUNCTIONAL_ASSESSMENT: PREVENTS OR INTERFERES SOME ACTIVE ACTIVITIES AND ADLS
PAIN_FUNCTIONAL_ASSESSMENT: PREVENTS OR INTERFERES SOME ACTIVE ACTIVITIES AND ADLS

## 2024-07-15 NOTE — PROGRESS NOTES
[facility]  Hematology/Oncology  Consult      Patient Name: Sherie Guerrero  YOB: 1960  PCP: Ovidio Avery MD   Referring Provider:      Reason for Consultation:   Chief Complaint   Patient presents with    Chest Pain     Patient c/o midsternal chest pain intermittently throughout the day. Denies SOB or dizziness.        History of Present Illness:    This is a 63-year-old female patient following with Dr. Silva for JAK2 negative ET. She is on treatment with Hydrea 500 mg BID.  She does not take ASA 81 mg as she is allergic. Platelets on 6/6/24 were 493. WBC and Hgb WNL. MCV elevated and stable from hydrea (120). Plan was to continue current dosing of Hydrea. EGD 3/1 reportedly normal. She was to return to the clinic in 3 months.     Patient presented to the ED for evaluation of chest pain. Her troponins and ProBNP were slightly elevated but a stress test was positive for inferior ischemia. CTS was consulted and she underwent an urgent LHC which demonstrated severe MV CAD. Because of her LHC and unstable angina, it was thought that she would benefit from revascularization during this hospital stay. Recommend full preoperative work-up including TTE in prep for CABG 7/9/24. She may need aspirin desensitization protocol, discussed with patient. CBC with ., platelets 563. WBC and Hgb WN Consultation for any special recommendations for CPB, post-op care with a history of essential thrombocythemia.    Review of systems: Over 10 systems were reviewed and all were negative except as mention above.      Diagnostic Data:     Past Medical History:   Diagnosis Date    Diabetes (HCC)     Hernia     Hypertension     Obesity     PCOS (polycystic ovarian syndrome)        Patient Active Problem List    Diagnosis Date Noted    Acute postoperative respiratory insufficiency 07/09/2024    Acute post-operative pain 07/09/2024    ACS (acute coronary syndrome) (HCC) 07/08/2024    Aspirin  multivessel coronary artery disease noted on cardiac cath, plan for CABG.  - Hydrea was increased to attempt to improve platelet count prior to surgery to decrease chances of perioperative thrombosis.  Plt today 436, improving. Reduce Hydrea dose to 500 mg bid.  -Surgery scheduled on Tuesday.   - Will continue to follow.     7/7/24  - JAK2 negative essential thrombocythemia on hydroxyurea, (reports allergy to aspirin)   - Cardiology/CTS following for unstable angina with multivessel coronary artery disease noted on cardiac cath, plan for CABG.  - Hydrea was increased to attempt to improve platelet count prior to surgery to decrease chances of perioperative thrombosis. Hydrea dose reduced back to 500 mg bid 7/5. Platelets continue to improve 426.  -Surgery scheduled on Tuesday.  Hold hydroxyurea in perioperative phase-24 hours before and after surgery.  - Will continue to follow.     7/10/24  - JAK2 negative essential thrombocythemia on hydroxyurea, (reports allergy to aspirin)   - Cardiology/CTS following for unstable angina with multivessel coronary artery disease noted on cardiac cath,S/p CABG.  -S/p CABG yesterday 7/9/24 Hydroxyurea held in perioperative phase-24 hours before and after surgery. Need to be restarted.   - Will continue to follow.     7/15/24  - JAK2 negative ET on hydroxyurea, (reports allergy to aspirin)   - Cardiology/CTS following for unstable angina with multivessel coronary artery disease noted on cardiac cath,S/p CABG.  - S/p CABG yesterday 7/9/24 Hydroxyurea held in perioperative phase-24 hours before and after surgery  - Platelets are now 476  - OK to resume Hydrea after 24 hours and once cleared by CTS      MINOR Viera - CNP  Electronically signed 7/15/2024 at 4:10 PM  Patient seen and examined. Note updated  Delta Silva MD

## 2024-07-15 NOTE — PROGRESS NOTES
PA-C, 24 Units at 07/14/24 2135    ipratropium 0.5 mg-albuterol 2.5 mg (DUONEB) nebulizer solution 1 Dose, 1 Dose, Inhalation, Q4H PRN, Art Blue MD    enoxaparin (LOVENOX) injection 40 mg, 40 mg, SubCUTAneous, Daily, Mar Salas APRN - CNP, 40 mg at 07/14/24 0831    amiodarone (CORDARONE) tablet 400 mg, 400 mg, Oral, BID, Mar Salas APRN - CNP, 400 mg at 07/13/24 1957    bethanechol (URECHOLINE) tablet 25 mg, 25 mg, Oral, TID, Mar Salas APRN - CNP, 25 mg at 07/14/24 2133    piperacillin-tazobactam (ZOSYN) 3,375 mg in sodium chloride 0.9 % 50 mL IVPB ([id number]), 3,375 mg, IntraVENous, Q8H, Mar Salas APRN - CNP, Last Rate: 12.5 mL/hr at 07/15/24 0710, 3,375 mg at 07/15/24 0710    insulin lispro (HUMALOG,ADMELOG) injection vial 0-16 Units, 0-16 Units, SubCUTAneous, TID WC, Linnea Garcia APRN - CNP, 8 Units at 07/14/24 1638    insulin lispro (HUMALOG,ADMELOG) injection vial 0-4 Units, 0-4 Units, SubCUTAneous, Nightly, Linnea Garcia APRN - CNP, 4 Units at 07/13/24 1957    acetaminophen (TYLENOL) tablet 650 mg, 650 mg, Oral, Q4H PRN, Linnea Garcia APRN - CNP    bisacodyl (DULCOLAX) EC tablet 5 mg, 5 mg, Oral, Daily, Linnea Garcia APRN - CNP, 5 mg at 07/14/24 0830    sennosides-docusate sodium (SENOKOT-S) 8.6-50 MG tablet 1 tablet, 1 tablet, Oral, BID, Linnea Garcia APRN - CNP, 1 tablet at 07/14/24 0830    magnesium hydroxide (MILK OF MAGNESIA) 400 MG/5ML suspension 30 mL, 30 mL, Oral, Daily, Linnea Garcia APRN - CNP, 30 mL at 07/14/24 0831    amiodarone (CORDARONE) tablet 400 mg, 400 mg, Oral, PRN, Linnea Garcia APRN - CNP    diphenhydrAMINE (BENADRYL) tablet 25 mg, 25 mg, Oral, Q8H PRN, Linnea Garcia APRN - CNP, 25 mg at 07/12/24 2105    folic acid (FOLVITE) tablet 1 mg, 1 mg, Oral, Daily, Linnea Garcia APRN - CNP, 1 mg at 07/14/24 0830    ferrous sulfate (IRON 325) tablet 325 mg, 325 mg, Oral, BID WC, Linnea Garcia APRN - CNP, 325 mg at  Q4H PRN, Linnea Garcia APRN - CNP, 10 mg at 07/15/24 0358    magnesium oxide (MAG-OX) tablet 400 mg, 400 mg, Oral, Daily, Linnea Garcia APRN - CNP, 400 mg at 07/14/24 0830    bisacodyl (DULCOLAX) suppository 10 mg, 10 mg, Rectal, Daily PRN, Linnea Garcia APRN - CNP    polyethylene glycol (GLYCOLAX) packet 17 g, 17 g, Oral, Daily, Linnea Garcia APRN - CNP, 17 g at 07/14/24 0831    clopidogrel (PLAVIX) tablet 75 mg, 75 mg, Oral, Daily, Linnea Garcia APRN - CNP, 75 mg at 07/14/24 0830    pantoprazole (PROTONIX) tablet 40 mg, 40 mg, Oral, QAM AC, Linnea Garcia APRN - CNP, 40 mg at 07/15/24 0705    lidocaine 4 % external patch 1 patch, 1 patch, TransDERmal, Daily, Linnea Garcia APRN - CNP, 1 patch at 07/14/24 0830    bisacodyl (DULCOLAX) suppository 10 mg, 10 mg, Rectal, Daily, Linnea Garcia APRN - CNP    atorvastatin (LIPITOR) tablet 40 mg, 40 mg, Oral, Nightly, Linnea Garcia APRN - CNP, 40 mg at 07/14/24 2133    ADULT DIET; Regular; 4 carb choices (60 gm/meal); Low Fat/Low Chol/High Fiber/GERMAN  ADULT ORAL NUTRITION SUPPLEMENT; Breakfast, Lunch, Dinner; Diabetic Oral Supplement  ADULT ORAL NUTRITION SUPPLEMENT; Breakfast, Dinner; Wound Healing Oral Supplement    XR ABDOMEN (KUB) (SINGLE AP VIEW)   Final Result   1. Normal bowel gas pattern without evidence of obstruction.   2. Moderate osteoarthritic changes of the lumbar spine.         XR CHEST PORTABLE   Final Result   1. There are no signs of an acute cardiopulmonary process.   2. Improved aeration of the left lung base.         XR CHEST PORTABLE   Final Result   1. Persistent left-sided pleural effusion with left basilar   atelectasis/infiltrate.   2. Left-sided chest tube remaining in place.         XR CHEST PORTABLE   Final Result   Left lung atelectasis/pneumonia as discussed.         XR CHEST PORTABLE   Final Result   1. Postsurgical changes.   2. Low lung volumes with central and bibasilar subsegmental atelectasis.   Possible

## 2024-07-15 NOTE — PLAN OF CARE
Problem: Safety - Adult  Goal: Free from fall injury  7/15/2024 1038 by Camelia Perez RN  Outcome: Progressing     Problem: Chronic Conditions and Co-morbidities  Goal: Patient's chronic conditions and co-morbidity symptoms are monitored and maintained or improved  7/15/2024 1038 by Camelia Perez RN  Outcome: Progressing     Problem: ABCDS Injury Assessment  Goal: Absence of physical injury  7/15/2024 1038 by Camelia Perez RN  Outcome: Progressing     Problem: Pain  Goal: Verbalizes/displays adequate comfort level or baseline comfort level  7/15/2024 1038 by Camelia Perez RN  Outcome: Progressing     Problem: Discharge Planning  Goal: Discharge to home or other facility with appropriate resources  7/15/2024 1038 by Camelia Perez RN  Outcome: Progressing     Problem: Metabolic/Fluid and Electrolytes - Adult  Goal: Electrolytes maintained within normal limits  Outcome: Progressing     Problem: Metabolic/Fluid and Electrolytes - Adult  Goal: Hemodynamic stability and optimal renal function maintained  Outcome: Progressing     Problem: Metabolic/Fluid and Electrolytes - Adult  Goal: Glucose maintained within prescribed range  Outcome: Progressing     Problem: Hematologic - Adult  Goal: Maintains hematologic stability  Outcome: Progressing     Problem: Neurosensory - Adult  Goal: Achieves stable or improved neurological status  Outcome: Progressing

## 2024-07-15 NOTE — PROGRESS NOTES
[facility] Quality Flow/Interdisciplinary Rounds Progress Note        Quality Flow Rounds held on July 15, 2024    Disciplines Attending:  Bedside Nurse, , , and Nursing Unit Leadership    Sherie Guerrero was admitted on 7/1/2024  7:56 PM    Anticipated Discharge Date:       Disposition:    Joe Score:  Joe Scale Score: 19    Readmission Risk              Risk of Unplanned Readmission:  21           Discussed patient goal for the day, patient clinical progression, and barriers to discharge.  The following Goal(s) of the Day/Commitment(s) have been identified:  Diagnostics - Report Results and Labs - Report Results      Tameka Obando RN  July 15, 2024

## 2024-07-15 NOTE — PROGRESS NOTES
Patient's IV in right wrist removed due to amiodarone protocol. Amiodarone now running in patient's left wrist site. X2 RN attempted to obtain additional IV access for patient's IV zosyn to ran but were unsuccessful. MICU called to assist. Per MICU RN someone would be up shortly.     IV placed.

## 2024-07-15 NOTE — PROGRESS NOTES
Physical Therapy  Physical Therapy Treatment     Name: Sherie Guerrero  : 1960  MRN: [id number]      Date of Service: 7/15/2024    Evaluating PT:  Long Tucker PT, DPT [id number]    Room #:  6519/6519-A  Diagnosis:  Chest pain [R07.9]  ACS (acute coronary syndrome) (HCC) [I24.9]  PMHx/PSHx:    Past Medical History:   Diagnosis Date    Diabetes (HCC)     Hernia     Hypertension     Obesity     PCOS (polycystic ovarian syndrome)      Procedure/Surgery:   CABG x 3  Precautions:  Falls, Sternal, O2, Chest tube x 2, Deaf in R ear, [place] in L ear - hearing aide  Equipment Needs:  TBD    SUBJECTIVE:    Pt will discharge to sister's 1 story home with 1+1 step(s) to enter and no rail(s).     Pt ambulated without device and was independent PTA.    OBJECTIVE:   Initial Evaluation  Date: 7/10/24 Treatment  7/15/24 Short Term/ Long Term   Goals   AM-PAC 6 Clicks 10/24 14/24    Was pt agreeable to Eval/treatment? Yes yes    Does pt have pain? -8/10 surgical pain None noted     Bed Mobility  Rolling: NT  Supine to sit: MaxA with HOB elevated  Sit to supine: NT  Scooting: MaxA NT Timmy   Transfers Sit to stand: ModA  Stand to sit: ModA  Stand pivot: ModA no device Sit to stand: Timmy  Stand to sit: Timmy  Stand pivot: Timmy no device Independent    Ambulation   A few steps to chair with ModA no device 4x40 feet no AD min A (standing rest breaks) >400 feet Independently   Stair negotiation: ascended and descended NT  >4 steps with 1 rail Mod Independent   ROM BUE:  Defer to OT note  BLE:  WFL     Strength BUE:  Defer to OT note  BLE:  4/5  Increase by 1/3 MMT grade   Balance Sitting EOB:  Timmy  Dynamic Standing:  ModA no device  Sitting EOB:  Independent  Dynamic Standing:  Independent     Pt is A & O x 4  Sensation:  paresthesias in hands  Edema:  none    Vitals:  Heart Rate at rest 90 bpm Heart Rate post session 87 bpm   SpO2 at rest 91% SpO2 post session 91%   Blood Pressure at rest NT Blood Pressure post session NT        Therapeutic Exercises:  BLE ROM x 5 reps    Patient education  Pt educated on safety, PT POC/frequency, sternal precautions    Patient response to education:   Pt verbalized understanding Pt demonstrated skill Pt requires further education in this area   yes yes yes     ASSESSMENT:  Conditions Requiring Skilled Therapeutic Intervention:  [x]Decreased strength     []Decreased ROM  [x]Decreased functional mobility  [x]Decreased balance   [x]Decreased endurance   [x]Decreased posture  [x]Decreased sensation  []Decreased coordination   []Decreased vision  []Decreased safety awareness   [x]Increased pain       Comments:  Pt was in chair upon arrival, agreeable to tx.  Pt was educated on sternal precautions and PLB prior to activity.  Pt required min A to stand from chair. Pt ambulated with slow, short steps and required multiple standing rest breaks d/t SOB. SpO2 92-95% during ambulation despite SOB.  Fatigue limited activity and ambulation distance today.  Pt was left in chair with all needs met and call light in reach.  All lines remained intact.  Educated pt on importance and benefits of rehab after DC in order to improve activity tolerance.     Treatment:  Patient practiced and was instructed in the following treatment:    Sitting EOB for >5 minutes for upright tolerance, postural awareness and BLE ROM  Transfer training - pt was given verbal and tactile cues to facilitate proper hand placement, technique and safety during sit to stand and stand to sit as well as provided with physical assistance.  Gait training- pt was given verbal and tactile cues to facilitate safety and balance during ambulation as well as provided with physical assistance.    Pt's/ family goals   1. Return home    Prognosis is good for reaching above PT goals.    Patient and or family understand(s) diagnosis, prognosis, and plan of care.  [x] Yes [] No      PHYSICAL THERAPY PLAN OF CARE:    PT POC is established based on physician order

## 2024-07-15 NOTE — PROGRESS NOTES
Occupational Therapy  OT BEDSIDE TREATMENT NOTE   TRU [facility]  [address], [place]        Date:7/15/2024  Patient Name: Sherie Guerrero  MRN: [id number]  : 1960  Room: [address] [place] [address]A     Per OT Eval:    Evaluating OT: Iqra Vázquez, OTR/L [id number]     Referring Provider:   izing   Radha Claros PA       Specific Provider Orders/Date: OT eval and treat (24)        Diagnosis: Chest pain [R07.9]  ACS (acute coronary syndrome) (HCC) [I24.9]         Surgery/Procedures:    urgent sternotomy, urgent CABG x3 (LIMA-LAD, SVG-RCA, SVG-diag), EVH, left atrial appendage ligation with 35mm Atriclip      Pertinent Medical History:    Past Medical History        Past Medical History:   Diagnosis Date    Diabetes (HCC)      Hernia      Hypertension      Obesity      PCOS (polycystic ovarian syndrome)           *Precautions:  Fall Risk, sternal, O2, chest tubes x 2, deaf in R ear     Assessment of current deficits   [x]Functional mobility            [x]ADLs           [x]Strength                  []Cognition  [x]Functional transfers          [x]IADLs          [x]Safety Awareness   [x]Endurance  []Fine Motor Coordination   [x]Balance       []Vision/perception    []Sensation      []Gross Motor Coordination [x]ROM           []Delirium                  [] Motor Control     []Communication      OT PLAN OF CARE   OT POC based on physician orders, patient diagnosis and results of clinical assessment.        Frequency/Duration: 1-3 days/wk for 1-2 weeks PRN     Specific OT Treatment to include:   ADL retraining/adapted techniques and AE recommendations to increase functional independence within precautions                    Energy conservation techniques to improve tolerance for selfcare routine   Functional transfer/mobility training/DME recommendations for increased independence, safety and fall prevention         Patient/family education to increase  Supported:  Independent    Standing:  Destiny S dynamic sitting balance; SBA dynamic standing balance  during ADL tasks & transfers   Endurance/  Activity Tolerance    Fair- tolerance with light activity.   Fair-  Easily fatigued, poor activity tolerance/endurance  G   tolerance with moderate activity/self care routine   Visual/  Perceptual               WFL                                          Education:  Pt was educated on role of OT, goals to be reached, importance of OOB activity, precautions to follow, safety and hand placement of transfers, safety/balance with functional mobility and techniques to assist with LB dressing tasks      Comments: Upon arrival pt seated upright in chair, agreeable to therapy, speaking with nursing okaying pt to be seen this session, visitor present. At end of session, pt seated upright in chair, visitor still present, all lines and tubes intact, call light within reach.     Pt has made fair progress towards set goals.   Continue with current plan of care      Treatment Time In: 10:06am           Treatment Time Out: 10:30am                Treatment Charges: Mins Units   Ther Ex  [id number]     Manual Therapy 13825     Thera Activities [id number] 16 1   ADL/Home Mgt [id number] 8 1   Neuro Re-ed [id number]     Group Therapy      Orthotic manage/training  23151     Non-Billable Time     Total Timed Treatment 24 2        Mirta Stevens KUHN/L [id number]

## 2024-07-15 NOTE — CARE COORDINATION
Transition of care update: POD#6 CABG x 3. Wires cont. Wbc 14.8 and other labs noted. IV lasix 20mg x 1, iv zosyn 3,375mg q 8 hrs. Chart reviewed. Asked for pt/ot to work with pt this am.  Spoke with OT and pt would benefit from jaky. Met with pt and her brother, Mark Anthony, in room. Both were agreeable to jaky. I provided  them with jaky list to make at least 4 choices. Confirmed with Diann with [facility] they are following pt. Cm/sw will follow.     1455  JAKY choices #1 [facility] - per Diann with [facility] they have no bed available, #2 [facility] - referral was given to [place] and #3 [facility].

## 2024-07-15 NOTE — PROGRESS NOTES
POD# 6 Awake, alert. No complaints. Denies CP, palpitations, SOB at rest, dizziness/lightheadedness.    Vitals:    07/14/24 2345 07/15/24 0358 07/15/24 0603 07/15/24 0721   BP: 113/61 (!) 149/70  113/60   Pulse: 64 76  70   Resp: 17 18  18   Temp: 97.1 °F (36.2 °C) 97.4 °F (36.3 °C)  97.3 °F (36.3 °C)   TempSrc: Temporal Temporal  Temporal   SpO2: 97% 94%  96%   Weight:   96.4 kg (212 lb 9.6 oz)    Height:         O2: 2L/NC      Intake/Output Summary (Last 24 hours) at 7/15/2024 0819  Last data filed at 7/15/2024 0643  Gross per 24 hour   Intake 500 ml   Output 1575 ml   Net -1075 ml         +BM on 7/14    UO: 400mL/8hr         Recent Labs     07/13/24  0608 07/14/24  0519 07/15/24  0443   WBC 16.6* 14.4* 14.8*   HGB 9.1* 9.3* 8.9*   HCT 27.8* 27.8* 28.1*    436 476*      Recent Labs     07/13/24  0608 07/14/24  0519 07/15/24  0443   BUN 38* 34* 28*   CREATININE 1.1* 1.0 1.0         Telemetry: SR      PE  Cardiac: RRR  Lungs: decreased bases  Chest incision with intact TONY DSD. Sternum stable. Surgical bra on. Prior chest tube site incisions C/D/I, no erythema with intact sutures.  Epicardial pacing wires present and secure.   Abd: Soft, nontender, +BS, +flatus  Ext: Incisions C/D/I, approximated, no erythema, + edema           A/P: POD# 6    1. CAD/UA  --Stable s/p urgent sternotomy, urgent CABG x3 (LIMA-LAD, SVG-RCA, SVG-diag), EVH, left atrial appendage ligation with 35mm Atriclip on 7/9/2024  --Post op KIARA reveals 60% EF  --DAPT/statin/BB- will cont plavix today,but if any further afib will need held for poss eliquis on DC   --reinforce sternal precautions  --continue epicardial pacing wires  -- All CTs out       2. Expected acute blood loss anemia secondary to open heart surgery  --stable        3. PAF  -- Afib RVR am of 7/14, amio bolus given and amio gtt initiated  --continue BB with hold parameters, up-titrate to Lopressor 50mg today 7/15  -- Cont PO amio with plans to taper on DC   -- Cont Plavix as

## 2024-07-15 NOTE — PLAN OF CARE
Problem: Chronic Conditions and Co-morbidities  Goal: Patient's chronic conditions and co-morbidity symptoms are monitored and maintained or improved  7/14/2024 2322 by Ernestine Contreras RN  Outcome: Progressing  7/14/2024 1011 by Camelia Perez RN  Outcome: Progressing     Problem: Safety - Adult  Goal: Free from fall injury  7/14/2024 2322 by Ernestine Contreras RN  Outcome: Progressing  7/14/2024 1011 by Camelia Perez RN  Outcome: Progressing     Problem: ABCDS Injury Assessment  Goal: Absence of physical injury  7/14/2024 2322 by Ernestine Contreras RN  Outcome: Progressing  7/14/2024 1011 by Camelia Perez RN  Outcome: Progressing     Problem: Pain  Goal: Verbalizes/displays adequate comfort level or baseline comfort level  7/14/2024 2322 by Ernestine Contreras RN  Outcome: Progressing  7/14/2024 1011 by Camelia Perez RN  Outcome: Progressing     Problem: Discharge Planning  Goal: Discharge to home or other facility with appropriate resources  7/14/2024 2322 by Ernestine Contreras RN  Outcome: Progressing  7/14/2024 1011 by Camelia Perez RN  Outcome: Progressing

## 2024-07-16 LAB
ANION GAP SERPL CALCULATED.3IONS-SCNC: 11 MMOL/L (ref 7–16)
ANION GAP SERPL CALCULATED.3IONS-SCNC: 14 MMOL/L (ref 7–16)
BUN SERPL-MCNC: 26 MG/DL (ref 6–23)
BUN SERPL-MCNC: 26 MG/DL (ref 6–23)
CALCIUM SERPL-MCNC: 8.5 MG/DL (ref 8.6–10.2)
CALCIUM SERPL-MCNC: 8.9 MG/DL (ref 8.6–10.2)
CHLORIDE SERPL-SCNC: 102 MMOL/L (ref 98–107)
CHLORIDE SERPL-SCNC: 98 MMOL/L (ref 98–107)
CO2 SERPL-SCNC: 22 MMOL/L (ref 22–29)
CO2 SERPL-SCNC: 24 MMOL/L (ref 22–29)
CREAT SERPL-MCNC: 1 MG/DL (ref 0.5–1)
CREAT SERPL-MCNC: 1 MG/DL (ref 0.5–1)
ERYTHROCYTE [DISTWIDTH] IN BLOOD BY AUTOMATED COUNT: 13.5 % (ref 11.5–15)
GFR, ESTIMATED: 61 ML/MIN/1.73M2
GFR, ESTIMATED: 62 ML/MIN/1.73M2
GLUCOSE BLD-MCNC: 127 MG/DL (ref 74–99)
GLUCOSE BLD-MCNC: 151 MG/DL (ref 74–99)
GLUCOSE BLD-MCNC: 204 MG/DL (ref 74–99)
GLUCOSE BLD-MCNC: 95 MG/DL (ref 74–99)
GLUCOSE SERPL-MCNC: 102 MG/DL (ref 74–99)
GLUCOSE SERPL-MCNC: 147 MG/DL (ref 74–99)
HCT VFR BLD AUTO: 29.4 % (ref 34–48)
HGB BLD-MCNC: 9.2 G/DL (ref 11.5–15.5)
MAGNESIUM SERPL-MCNC: 2.6 MG/DL (ref 1.6–2.6)
MAGNESIUM SERPL-MCNC: 2.7 MG/DL (ref 1.6–2.6)
MCH RBC QN AUTO: 41.1 PG (ref 26–35)
MCHC RBC AUTO-ENTMCNC: 31.3 G/DL (ref 32–34.5)
MCV RBC AUTO: 131.3 FL (ref 80–99.9)
PLATELET # BLD AUTO: 528 K/UL (ref 130–450)
PMV BLD AUTO: 10.3 FL (ref 7–12)
POTASSIUM SERPL-SCNC: 4.8 MMOL/L (ref 3.5–5)
POTASSIUM SERPL-SCNC: 6.4 MMOL/L (ref 3.5–5)
RBC # BLD AUTO: 2.24 M/UL (ref 3.5–5.5)
SODIUM SERPL-SCNC: 134 MMOL/L (ref 132–146)
SODIUM SERPL-SCNC: 137 MMOL/L (ref 132–146)
WBC OTHER # BLD: 14.5 K/UL (ref 4.5–11.5)

## 2024-07-16 PROCEDURE — 36415 COLL VENOUS BLD VENIPUNCTURE: CPT

## 2024-07-16 PROCEDURE — 6370000000 HC RX 637 (ALT 250 FOR IP): Performed by: INTERNAL MEDICINE

## 2024-07-16 PROCEDURE — 6370000000 HC RX 637 (ALT 250 FOR IP): Performed by: NURSE PRACTITIONER

## 2024-07-16 PROCEDURE — 2700000000 HC OXYGEN THERAPY PER DAY

## 2024-07-16 PROCEDURE — 2140000000 HC CCU INTERMEDIATE R&B

## 2024-07-16 PROCEDURE — 6360000002 HC RX W HCPCS: Performed by: NURSE PRACTITIONER

## 2024-07-16 PROCEDURE — 93798 PHYS/QHP OP CAR RHAB W/ECG: CPT

## 2024-07-16 PROCEDURE — 6360000002 HC RX W HCPCS: Performed by: PHYSICIAN ASSISTANT

## 2024-07-16 PROCEDURE — 2580000003 HC RX 258: Performed by: NURSE PRACTITIONER

## 2024-07-16 PROCEDURE — 85027 COMPLETE CBC AUTOMATED: CPT

## 2024-07-16 PROCEDURE — 83735 ASSAY OF MAGNESIUM: CPT

## 2024-07-16 PROCEDURE — 6370000000 HC RX 637 (ALT 250 FOR IP): Performed by: PHYSICIAN ASSISTANT

## 2024-07-16 PROCEDURE — 82962 GLUCOSE BLOOD TEST: CPT

## 2024-07-16 PROCEDURE — 80048 BASIC METABOLIC PNL TOTAL CA: CPT

## 2024-07-16 RX ORDER — TIZANIDINE 4 MG/1
4 TABLET ORAL EVERY 6 HOURS PRN
Status: DISCONTINUED | OUTPATIENT
Start: 2024-07-16 | End: 2024-07-19 | Stop reason: HOSPADM

## 2024-07-16 RX ORDER — FUROSEMIDE 10 MG/ML
20 INJECTION INTRAMUSCULAR; INTRAVENOUS ONCE
Status: COMPLETED | OUTPATIENT
Start: 2024-07-16 | End: 2024-07-16

## 2024-07-16 RX ADMIN — ASPIRIN 81 MG: 81 TABLET, COATED ORAL at 08:37

## 2024-07-16 RX ADMIN — SENNOSIDES AND DOCUSATE SODIUM 1 TABLET: 50; 8.6 TABLET ORAL at 20:19

## 2024-07-16 RX ADMIN — SODIUM CHLORIDE, PRESERVATIVE FREE 10 ML: 5 INJECTION INTRAVENOUS at 20:20

## 2024-07-16 RX ADMIN — FOLIC ACID 1 MG: 1 TABLET ORAL at 08:38

## 2024-07-16 RX ADMIN — BETHANECHOL CHLORIDE 25 MG: 25 TABLET ORAL at 14:38

## 2024-07-16 RX ADMIN — BISACODYL 5 MG: 5 TABLET, COATED ORAL at 08:37

## 2024-07-16 RX ADMIN — SENNOSIDES AND DOCUSATE SODIUM 1 TABLET: 50; 8.6 TABLET ORAL at 08:37

## 2024-07-16 RX ADMIN — BETHANECHOL CHLORIDE 25 MG: 25 TABLET ORAL at 20:20

## 2024-07-16 RX ADMIN — AMIODARONE HYDROCHLORIDE 400 MG: 200 TABLET ORAL at 20:20

## 2024-07-16 RX ADMIN — ONDANSETRON 4 MG: 2 INJECTION INTRAMUSCULAR; INTRAVENOUS at 11:31

## 2024-07-16 RX ADMIN — OXYCODONE 5 MG: 5 TABLET ORAL at 17:51

## 2024-07-16 RX ADMIN — HYDROXYUREA 500 MG: 500 CAPSULE ORAL at 08:37

## 2024-07-16 RX ADMIN — HYDROXYUREA 500 MG: 500 CAPSULE ORAL at 20:19

## 2024-07-16 RX ADMIN — PIPERACILLIN AND TAZOBACTAM 3375 MG: 3; .375 INJECTION, POWDER, LYOPHILIZED, FOR SOLUTION INTRAVENOUS at 14:40

## 2024-07-16 RX ADMIN — METFORMIN HYDROCHLORIDE 500 MG: 500 TABLET ORAL at 08:37

## 2024-07-16 RX ADMIN — Medication 500 MG: at 08:37

## 2024-07-16 RX ADMIN — METFORMIN HYDROCHLORIDE 500 MG: 500 TABLET ORAL at 17:19

## 2024-07-16 RX ADMIN — FUROSEMIDE 20 MG: 10 INJECTION, SOLUTION INTRAMUSCULAR; INTRAVENOUS at 08:37

## 2024-07-16 RX ADMIN — PIPERACILLIN AND TAZOBACTAM 3375 MG: 3; .375 INJECTION, POWDER, LYOPHILIZED, FOR SOLUTION INTRAVENOUS at 23:51

## 2024-07-16 RX ADMIN — Medication 400 MG: at 08:37

## 2024-07-16 RX ADMIN — AMIODARONE HYDROCHLORIDE 400 MG: 200 TABLET ORAL at 08:38

## 2024-07-16 RX ADMIN — SODIUM CHLORIDE, PRESERVATIVE FREE 10 ML: 5 INJECTION INTRAVENOUS at 08:38

## 2024-07-16 RX ADMIN — METOPROLOL TARTRATE 50 MG: 50 TABLET, FILM COATED ORAL at 08:38

## 2024-07-16 RX ADMIN — METOPROLOL TARTRATE 50 MG: 50 TABLET, FILM COATED ORAL at 20:19

## 2024-07-16 RX ADMIN — FERROUS SULFATE TAB 325 MG (65 MG ELEMENTAL FE) 325 MG: 325 (65 FE) TAB at 17:19

## 2024-07-16 RX ADMIN — INSULIN GLARGINE 24 UNITS: 100 INJECTION, SOLUTION SUBCUTANEOUS at 20:18

## 2024-07-16 RX ADMIN — ENOXAPARIN SODIUM 40 MG: 100 INJECTION SUBCUTANEOUS at 08:37

## 2024-07-16 RX ADMIN — INSULIN LISPRO 8 UNITS: 100 INJECTION, SOLUTION INTRAVENOUS; SUBCUTANEOUS at 12:10

## 2024-07-16 RX ADMIN — ATORVASTATIN CALCIUM 40 MG: 40 TABLET, FILM COATED ORAL at 20:19

## 2024-07-16 RX ADMIN — CLOPIDOGREL BISULFATE 75 MG: 75 TABLET ORAL at 08:38

## 2024-07-16 RX ADMIN — OXYCODONE HYDROCHLORIDE 10 MG: 10 TABLET ORAL at 00:49

## 2024-07-16 RX ADMIN — PANTOPRAZOLE SODIUM 40 MG: 40 TABLET, DELAYED RELEASE ORAL at 06:55

## 2024-07-16 RX ADMIN — FERROUS SULFATE TAB 325 MG (65 MG ELEMENTAL FE) 325 MG: 325 (65 FE) TAB at 08:38

## 2024-07-16 RX ADMIN — Medication 500 MG: at 20:19

## 2024-07-16 RX ADMIN — BETHANECHOL CHLORIDE 25 MG: 25 TABLET ORAL at 08:38

## 2024-07-16 RX ADMIN — PIPERACILLIN AND TAZOBACTAM 3375 MG: 3; .375 INJECTION, POWDER, LYOPHILIZED, FOR SOLUTION INTRAVENOUS at 06:57

## 2024-07-16 ASSESSMENT — PAIN DESCRIPTION - LOCATION: LOCATION: CHEST;SHOULDER

## 2024-07-16 ASSESSMENT — PAIN DESCRIPTION - ORIENTATION
ORIENTATION: LEFT
ORIENTATION: MID;LEFT

## 2024-07-16 ASSESSMENT — PAIN SCALES - GENERAL
PAINLEVEL_OUTOF10: 4
PAINLEVEL_OUTOF10: 0
PAINLEVEL_OUTOF10: 5
PAINLEVEL_OUTOF10: 7

## 2024-07-16 ASSESSMENT — PAIN DESCRIPTION - DESCRIPTORS: DESCRIPTORS: SORE;SHOOTING;DISCOMFORT

## 2024-07-16 ASSESSMENT — PAIN - FUNCTIONAL ASSESSMENT: PAIN_FUNCTIONAL_ASSESSMENT: PREVENTS OR INTERFERES SOME ACTIVE ACTIVITIES AND ADLS

## 2024-07-16 NOTE — PATIENT CARE CONFERENCE
[facility] Quality Flow/Interdisciplinary Rounds Progress Note        Quality Flow Rounds held on July 16, 2024    Disciplines Attending:  Bedside Nurse, , , and Nursing Unit Leadership    Sherie Guerrero was admitted on 7/1/2024  7:56 PM    Anticipated Discharge Date:       Disposition:    Joe Score:  Joe Scale Score: 19    Readmission Risk              Risk of Unplanned Readmission:  21           Discussed patient goal for the day, patient clinical progression, and barriers to discharge.  The following Goal(s) of the Day/Commitment(s) have been identified:  ambulate/discharge planning       Anamika Palmer RN  July 16, 2024

## 2024-07-16 NOTE — CARE COORDINATION
Transition of care update: POD#7 CABG x 3. Wires cont. IV zosyn 3,375mg q 8 hrs. Labs noted.     0945  Left vm with Marimar with [facility] to check on acceptance. Referral was made to Rox with [facility].       1213  Sent message to [facility] rep to check on acceptance.     1411  Still waiting to hear back from [facility].     1505  [facility] accepted and are submitting for pre cert with pt's insurance. [facility] with [facility] was updated. Met with pt and pt's son in room and updated them on acceptance at [facility].     1530  PASRR and ambulette form completed. Cardiac surgery rehab protocol was placed in transport envelope. Envelope was placed with pt's soft chart.

## 2024-07-16 NOTE — PROGRESS NOTES
POD#7 Awake, alert. No complaints. Denies CP, palpitations, SOB at rest, dizziness/lightheadedness.    Vitals:    07/16/24 0300 07/16/24 0309 07/16/24 0654 07/16/24 0727   BP:  (!) 115/59  (!) 135/56   Pulse:  64  69   Resp:  18  18   Temp:  97.1 °F (36.2 °C)  97.5 °F (36.4 °C)   TempSrc:  Temporal  Temporal   SpO2: (!) 87% 93%  96%   Weight:   95.9 kg (211 lb 6.4 oz)    Height:         O2: 2L/NC      Intake/Output Summary (Last 24 hours) at 7/16/2024 0824  Last data filed at 7/16/2024 0654  Gross per 24 hour   Intake 320 ml   Output 1100 ml   Net -780 ml       +BM on 7/14    UO: 700mL/8hr       Recent Labs     07/14/24  0519 07/15/24  0443 07/16/24  0550   WBC 14.4* 14.8* 14.5*   HGB 9.3* 8.9* 9.2*   HCT 27.8* 28.1* 29.4*    476* 528*      Recent Labs     07/14/24  0519 07/15/24  0443 07/16/24  0550   BUN 34* 28* 26*   CREATININE 1.0 1.0 1.0         Telemetry: SR    PE  Cardiac: RRR  Lungs: decreased bases  Chest incision with intact TONY DSD. Sternum stable. Surgical bra on. Prior chest tube site incisions C/D/I, no erythema with intact sutures.  Epicardial pacing wires present and secure.   Abd: Soft, nontender, +BS, +flatus  Ext: Incisions C/D/I, approximated, no erythema, + edema        A/P: POD#7       1. CAD/UA  --Stable s/p urgent sternotomy, urgent CABG x3 (LIMA-LAD, SVG-RCA, SVG-diag), EVH, left atrial appendage ligation with 35mm Atriclip on 7/9/2024  --Post op KIARA reveals 60% EF  --DAPT/statin/BB- will cont plavix today, but if any further afib will need held for poss eliquis on DC   --reinforce sternal precautions  --continue epicardial pacing wires  --All CTs out    --will remove tony dressing today       2. Expected acute blood loss anemia secondary to open heart surgery  --stable; hgb 9.2        3. PAF  --Afib RVR am of 7/14, amio bolus given and amio gtt initiated  --continue BB with hold parameters, up-titrate to Lopressor 50mg today 7/15  --Cont PO amio with plans to taper on DC   --Cont

## 2024-07-16 NOTE — PROGRESS NOTES
Linnea Garcia APRN - CNP, 325 mg at 07/16/24 1719    HYDROmorphone (DILAUDID) injection 0.25 mg, 0.25 mg, IntraVENous, Q4H PRN, Linnea Garcia APRN - CNP    glucose chewable tablet 16 g, 4 tablet, Oral, PRN, Linnea Garcia APRN - CNP    dextrose bolus 10% 125 mL, 125 mL, IntraVENous, PRN **OR** dextrose bolus 10% 250 mL, 250 mL, IntraVENous, PRN, Linnea Garcia APRN - CNP    glucagon injection 1 mg, 1 mg, IntraMUSCular, PRN, Linnea Garcia APRN - CNP    dextrose 10 % infusion, , IntraVENous, Continuous PRN, Linnea Garcia APRN - CNP    ascorbic acid (VITAMIN C) tablet 500 mg, 500 mg, Oral, BID, Linnea Garcia APRN - CNP, 500 mg at 07/16/24 0837    sodium chloride (OCEAN, BABY AYR) 0.65 % nasal spray 1 spray, 1 spray, Each Nostril, PRN, Linnea Garcia APRN - CNP    prochlorperazine (COMPAZINE) injection 10 mg, 10 mg, IntraMUSCular, Q6H PRN, Linnea Garcia APRN - CNP    potassium chloride (KLOR-CON M) extended release tablet 20 mEq, 20 mEq, Oral, PRN, Linnea Garcia APRN - CNP    ondansetron (ZOFRAN) injection 4 mg, 4 mg, IntraVENous, Q8H PRN, Linnea Garcia APRN - CNP, 4 mg at 07/16/24 1131    magnesium sulfate 2000 mg in 50 mL IVPB premix, 2,000 mg, IntraVENous, PRN, Linnea Garcia APRN - CNP    magnesium sulfate 2000 mg in 50 mL IVPB premix, 2,000 mg, IntraVENous, PRN, Linnea Garcia APRN - CNP    aspirin EC tablet 81 mg, 81 mg, Oral, Daily, Linnea Garcia APRN - CNP, 81 mg at 07/16/24 0837    hydroxyurea (HYDREA) chemo capsule 500 mg, 500 mg, Oral, BID, Dru Rodriguez MD, 500 mg at 07/16/24 0837    sodium chloride flush 0.9 % injection 5-40 mL, 5-40 mL, IntraVENous, 2 times per day, Linnea Garcia APRN - CNP, 10 mL at 07/16/24 0838    sodium chloride flush 0.9 % injection 5-40 mL, 5-40 mL, IntraVENous, PRN, Linnea Garcia, MINOR - CNP    oxyCODONE (ROXICODONE) immediate release tablet 5 mg, 5 mg, Oral, Q4H PRN, 5 mg at 07/15/24 1410 **OR** oxyCODONE HCl (OXY-IR)  immediate release tablet 10 mg, 10 mg, Oral, Q4H PRN, Linnea Garcia APRN - CNP, 10 mg at 07/16/24 0049    magnesium oxide (MAG-OX) tablet 400 mg, 400 mg, Oral, Daily, Linnea Garcia APRN - CNP, 400 mg at 07/16/24 0837    bisacodyl (DULCOLAX) suppository 10 mg, 10 mg, Rectal, Daily PRN, Linnea Garcia APRN - CNP    polyethylene glycol (GLYCOLAX) packet 17 g, 17 g, Oral, Daily, Linnea Garcia APRN - CNP, 17 g at 07/14/24 0831    clopidogrel (PLAVIX) tablet 75 mg, 75 mg, Oral, Daily, Linnea Garcia APRN - CNP, 75 mg at 07/16/24 0838    pantoprazole (PROTONIX) tablet 40 mg, 40 mg, Oral, QAM AC, Linnea Garcia APRN - CNP, 40 mg at 07/16/24 0655    lidocaine 4 % external patch 1 patch, 1 patch, TransDERmal, Daily, Linnea Garcia APRN - CNP, 1 patch at 07/15/24 0841    bisacodyl (DULCOLAX) suppository 10 mg, 10 mg, Rectal, Daily, Linnea Garcia APRN - CNP    atorvastatin (LIPITOR) tablet 40 mg, 40 mg, Oral, Nightly, Linnea Garcia APRN - CNP, 40 mg at 07/15/24 2048    ADULT DIET; Regular; 4 carb choices (60 gm/meal); Low Fat/Low Chol/High Fiber/GERMAN  ADULT ORAL NUTRITION SUPPLEMENT; Breakfast, Dinner; Wound Healing Oral Supplement    XR ABDOMEN (KUB) (SINGLE AP VIEW)   Final Result   1. Normal bowel gas pattern without evidence of obstruction.   2. Moderate osteoarthritic changes of the lumbar spine.         XR CHEST PORTABLE   Final Result   1. There are no signs of an acute cardiopulmonary process.   2. Improved aeration of the left lung base.         XR CHEST PORTABLE   Final Result   1. Persistent left-sided pleural effusion with left basilar   atelectasis/infiltrate.   2. Left-sided chest tube remaining in place.         XR CHEST PORTABLE   Final Result   Left lung atelectasis/pneumonia as discussed.         XR CHEST PORTABLE   Final Result   1. Postsurgical changes.   2. Low lung volumes with central and bibasilar subsegmental atelectasis.   Possible mild pulmonary edema.         Vascular

## 2024-07-17 ENCOUNTER — APPOINTMENT (OUTPATIENT)
Dept: GENERAL RADIOLOGY | Age: 64
DRG: 233 | End: 2024-07-17
Payer: COMMERCIAL

## 2024-07-17 LAB
ANION GAP SERPL CALCULATED.3IONS-SCNC: 11 MMOL/L (ref 7–16)
BNP SERPL-MCNC: 3874 PG/ML (ref 0–125)
BUN SERPL-MCNC: 24 MG/DL (ref 6–23)
CALCIUM SERPL-MCNC: 9.1 MG/DL (ref 8.6–10.2)
CHLORIDE SERPL-SCNC: 102 MMOL/L (ref 98–107)
CO2 SERPL-SCNC: 26 MMOL/L (ref 22–29)
CREAT SERPL-MCNC: 1 MG/DL (ref 0.5–1)
ERYTHROCYTE [DISTWIDTH] IN BLOOD BY AUTOMATED COUNT: 13.1 % (ref 11.5–15)
GFR, ESTIMATED: 62 ML/MIN/1.73M2
GLUCOSE BLD-MCNC: 111 MG/DL (ref 74–99)
GLUCOSE BLD-MCNC: 120 MG/DL (ref 74–99)
GLUCOSE BLD-MCNC: 120 MG/DL (ref 74–99)
GLUCOSE BLD-MCNC: 162 MG/DL (ref 74–99)
GLUCOSE SERPL-MCNC: 117 MG/DL (ref 74–99)
HCT VFR BLD AUTO: 31.4 % (ref 34–48)
HGB BLD-MCNC: 10.2 G/DL (ref 11.5–15.5)
MAGNESIUM SERPL-MCNC: 2.6 MG/DL (ref 1.6–2.6)
MCH RBC QN AUTO: 41 PG (ref 26–35)
MCHC RBC AUTO-ENTMCNC: 32.5 G/DL (ref 32–34.5)
MCV RBC AUTO: 126.1 FL (ref 80–99.9)
PLATELET # BLD AUTO: 595 K/UL (ref 130–450)
PMV BLD AUTO: 10.1 FL (ref 7–12)
POTASSIUM SERPL-SCNC: 4.6 MMOL/L (ref 3.5–5)
RBC # BLD AUTO: 2.49 M/UL (ref 3.5–5.5)
SODIUM SERPL-SCNC: 139 MMOL/L (ref 132–146)
WBC OTHER # BLD: 14.1 K/UL (ref 4.5–11.5)

## 2024-07-17 PROCEDURE — 6360000002 HC RX W HCPCS: Performed by: NURSE PRACTITIONER

## 2024-07-17 PROCEDURE — 2580000003 HC RX 258: Performed by: NURSE PRACTITIONER

## 2024-07-17 PROCEDURE — 36415 COLL VENOUS BLD VENIPUNCTURE: CPT

## 2024-07-17 PROCEDURE — 2140000000 HC CCU INTERMEDIATE R&B

## 2024-07-17 PROCEDURE — 6360000002 HC RX W HCPCS

## 2024-07-17 PROCEDURE — 6370000000 HC RX 637 (ALT 250 FOR IP): Performed by: NURSE PRACTITIONER

## 2024-07-17 PROCEDURE — 80048 BASIC METABOLIC PNL TOTAL CA: CPT

## 2024-07-17 PROCEDURE — 6370000000 HC RX 637 (ALT 250 FOR IP): Performed by: PHYSICIAN ASSISTANT

## 2024-07-17 PROCEDURE — 83735 ASSAY OF MAGNESIUM: CPT

## 2024-07-17 PROCEDURE — 93798 PHYS/QHP OP CAR RHAB W/ECG: CPT

## 2024-07-17 PROCEDURE — 83880 ASSAY OF NATRIURETIC PEPTIDE: CPT

## 2024-07-17 PROCEDURE — 71046 X-RAY EXAM CHEST 2 VIEWS: CPT

## 2024-07-17 PROCEDURE — 85027 COMPLETE CBC AUTOMATED: CPT

## 2024-07-17 PROCEDURE — 82962 GLUCOSE BLOOD TEST: CPT

## 2024-07-17 PROCEDURE — 2700000000 HC OXYGEN THERAPY PER DAY

## 2024-07-17 PROCEDURE — 6370000000 HC RX 637 (ALT 250 FOR IP): Performed by: INTERNAL MEDICINE

## 2024-07-17 RX ORDER — BUMETANIDE 0.25 MG/ML
1 INJECTION INTRAMUSCULAR; INTRAVENOUS ONCE
Status: COMPLETED | OUTPATIENT
Start: 2024-07-17 | End: 2024-07-17

## 2024-07-17 RX ORDER — HYDROXYUREA 500 MG/1
1000 CAPSULE ORAL 2 TIMES DAILY
Status: DISCONTINUED | OUTPATIENT
Start: 2024-07-17 | End: 2024-07-19

## 2024-07-17 RX ADMIN — BETHANECHOL CHLORIDE 25 MG: 25 TABLET ORAL at 23:39

## 2024-07-17 RX ADMIN — METOPROLOL TARTRATE 50 MG: 50 TABLET, FILM COATED ORAL at 08:51

## 2024-07-17 RX ADMIN — INSULIN GLARGINE 24 UNITS: 100 INJECTION, SOLUTION SUBCUTANEOUS at 21:22

## 2024-07-17 RX ADMIN — CLOPIDOGREL BISULFATE 75 MG: 75 TABLET ORAL at 08:53

## 2024-07-17 RX ADMIN — PIPERACILLIN AND TAZOBACTAM 3375 MG: 3; .375 INJECTION, POWDER, LYOPHILIZED, FOR SOLUTION INTRAVENOUS at 08:45

## 2024-07-17 RX ADMIN — SENNOSIDES AND DOCUSATE SODIUM 1 TABLET: 50; 8.6 TABLET ORAL at 08:53

## 2024-07-17 RX ADMIN — BETHANECHOL CHLORIDE 25 MG: 25 TABLET ORAL at 08:58

## 2024-07-17 RX ADMIN — FERROUS SULFATE TAB 325 MG (65 MG ELEMENTAL FE) 325 MG: 325 (65 FE) TAB at 08:51

## 2024-07-17 RX ADMIN — FERROUS SULFATE TAB 325 MG (65 MG ELEMENTAL FE) 325 MG: 325 (65 FE) TAB at 17:11

## 2024-07-17 RX ADMIN — METOPROLOL TARTRATE 50 MG: 50 TABLET, FILM COATED ORAL at 21:23

## 2024-07-17 RX ADMIN — OXYCODONE 5 MG: 5 TABLET ORAL at 08:54

## 2024-07-17 RX ADMIN — METFORMIN HYDROCHLORIDE 500 MG: 500 TABLET ORAL at 17:11

## 2024-07-17 RX ADMIN — Medication 500 MG: at 21:22

## 2024-07-17 RX ADMIN — FOLIC ACID 1 MG: 1 TABLET ORAL at 08:55

## 2024-07-17 RX ADMIN — BETHANECHOL CHLORIDE 25 MG: 25 TABLET ORAL at 13:49

## 2024-07-17 RX ADMIN — PANTOPRAZOLE SODIUM 40 MG: 40 TABLET, DELAYED RELEASE ORAL at 06:14

## 2024-07-17 RX ADMIN — BUMETANIDE 1 MG: 0.25 INJECTION INTRAMUSCULAR; INTRAVENOUS at 21:22

## 2024-07-17 RX ADMIN — ASPIRIN 81 MG: 81 TABLET, COATED ORAL at 08:55

## 2024-07-17 RX ADMIN — SODIUM CHLORIDE, PRESERVATIVE FREE 10 ML: 5 INJECTION INTRAVENOUS at 21:24

## 2024-07-17 RX ADMIN — INSULIN LISPRO 4 UNITS: 100 INJECTION, SOLUTION INTRAVENOUS; SUBCUTANEOUS at 12:17

## 2024-07-17 RX ADMIN — SODIUM CHLORIDE, PRESERVATIVE FREE 10 ML: 5 INJECTION INTRAVENOUS at 09:24

## 2024-07-17 RX ADMIN — Medication 500 MG: at 09:20

## 2024-07-17 RX ADMIN — METFORMIN HYDROCHLORIDE 500 MG: 500 TABLET ORAL at 08:53

## 2024-07-17 RX ADMIN — Medication 400 MG: at 08:55

## 2024-07-17 RX ADMIN — PIPERACILLIN AND TAZOBACTAM 3375 MG: 3; .375 INJECTION, POWDER, LYOPHILIZED, FOR SOLUTION INTRAVENOUS at 23:41

## 2024-07-17 RX ADMIN — ATORVASTATIN CALCIUM 40 MG: 40 TABLET, FILM COATED ORAL at 21:23

## 2024-07-17 RX ADMIN — OXYCODONE 5 MG: 5 TABLET ORAL at 21:23

## 2024-07-17 RX ADMIN — SENNOSIDES AND DOCUSATE SODIUM 1 TABLET: 50; 8.6 TABLET ORAL at 21:23

## 2024-07-17 RX ADMIN — HYDROXYUREA 1000 MG: 500 CAPSULE ORAL at 23:39

## 2024-07-17 RX ADMIN — HYDROXYUREA 500 MG: 500 CAPSULE ORAL at 08:59

## 2024-07-17 RX ADMIN — ENOXAPARIN SODIUM 40 MG: 100 INJECTION SUBCUTANEOUS at 08:56

## 2024-07-17 RX ADMIN — AMIODARONE HYDROCHLORIDE 400 MG: 200 TABLET ORAL at 08:52

## 2024-07-17 RX ADMIN — PIPERACILLIN AND TAZOBACTAM 3375 MG: 3; .375 INJECTION, POWDER, LYOPHILIZED, FOR SOLUTION INTRAVENOUS at 16:23

## 2024-07-17 RX ADMIN — AMIODARONE HYDROCHLORIDE 400 MG: 200 TABLET ORAL at 21:22

## 2024-07-17 RX ADMIN — BISACODYL 5 MG: 5 TABLET, COATED ORAL at 08:53

## 2024-07-17 RX ADMIN — BUMETANIDE 1 MG: 0.25 INJECTION INTRAMUSCULAR; INTRAVENOUS at 09:51

## 2024-07-17 ASSESSMENT — PAIN DESCRIPTION - LOCATION
LOCATION: CHEST
LOCATION: CHEST;INCISION

## 2024-07-17 ASSESSMENT — PAIN DESCRIPTION - ONSET
ONSET: ON-GOING
ONSET: ON-GOING

## 2024-07-17 ASSESSMENT — PAIN SCALES - GENERAL
PAINLEVEL_OUTOF10: 5
PAINLEVEL_OUTOF10: 4
PAINLEVEL_OUTOF10: 5
PAINLEVEL_OUTOF10: 2
PAINLEVEL_OUTOF10: 5
PAINLEVEL_OUTOF10: 4

## 2024-07-17 ASSESSMENT — PAIN DESCRIPTION - ORIENTATION
ORIENTATION: MID

## 2024-07-17 ASSESSMENT — PAIN DESCRIPTION - DESCRIPTORS
DESCRIPTORS: ACHING
DESCRIPTORS: ACHING;SHARP;SPASM
DESCRIPTORS: ACHING
DESCRIPTORS: ACHING;DISCOMFORT
DESCRIPTORS: ACHING
DESCRIPTORS: ACHING;SORE;TENDER

## 2024-07-17 ASSESSMENT — PAIN - FUNCTIONAL ASSESSMENT
PAIN_FUNCTIONAL_ASSESSMENT: ACTIVITIES ARE NOT PREVENTED
PAIN_FUNCTIONAL_ASSESSMENT: PREVENTS OR INTERFERES SOME ACTIVE ACTIVITIES AND ADLS

## 2024-07-17 ASSESSMENT — PAIN DESCRIPTION - FREQUENCY
FREQUENCY: INTERMITTENT
FREQUENCY: INTERMITTENT

## 2024-07-17 ASSESSMENT — PAIN DESCRIPTION - PAIN TYPE
TYPE: ACUTE PAIN;SURGICAL PAIN
TYPE: ACUTE PAIN;SURGICAL PAIN

## 2024-07-17 NOTE — PROGRESS NOTES
POD#8 Awake, alert. No complaints. Denies CP, palpitations, SOB at rest, dizziness/lightheadedness.    Vitals:    07/16/24 2330 07/17/24 0003 07/17/24 0345 07/17/24 0741   BP: (!) 118/59  122/60 134/68   Pulse: 62  65 65   Resp: 17  16 16   Temp: 97 °F (36.1 °C)  97.2 °F (36.2 °C) (!) 96.6 °F (35.9 °C)   TempSrc: Temporal  Temporal Temporal   SpO2: 93%  97% 95%   Weight:  95.2 kg (209 lb 12.8 oz)     Height:         O2: 1L/NC 95%      Intake/Output Summary (Last 24 hours) at 7/17/2024 0937  Last data filed at 7/17/2024 0914  Gross per 24 hour   Intake 720 ml   Output 1050 ml   Net -330 ml         +BM on 7/16    UO: 300mL/8hr       Recent Labs     07/15/24  0443 07/16/24  0550 07/17/24  0647   WBC 14.8* 14.5* 14.1*   HGB 8.9* 9.2* 10.2*   HCT 28.1* 29.4* 31.4*   * 528* 595*        Recent Labs     07/16/24  0550 07/16/24  0836 07/17/24  0647   BUN 26* 26* 24*   CREATININE 1.0 1.0 1.0           Telemetry: SR    PE  Cardiac: RRR  Lungs: decreased bases  Chest incision C/D/I, approximated ,no signs of infection. Sternum stable. Surgical bra on. Prior chest tube site incisions C/D/I, no erythema with intact sutures.  Epicardial pacing wires present and secure.   Abd: Soft, nontender, +BS, +flatus  Ext: Incisions C/D/I, approximated, no erythema, + edema        A/P: POD#8       1. CAD/UA  --Stable s/p urgent sternotomy, urgent CABG x3 (LIMA-LAD, SVG-RCA, SVG-diag), EVH, left atrial appendage ligation with 35mm Atriclip on 7/9/2024  --Post op KIARA reveals 60% EF  --DAPT/statin/BB- will cont plavix,  but if any further afib will need held for poss eliquis on DC   --reinforce sternal precautions  --continue epicardial pacing wires  --All CTs out           2. Expected acute blood loss anemia secondary to open heart surgery  --stable; hgb 10.2        3. PAF  --Afib RVR am of 7/14, amio bolus given and amio gtt initiated  --continue BB with hold parameters - on 50mg metoptolol BID   --Continue PO amio with plans to taper  on DC   --Contine Plavix as this is first occurrence and re-verted to SR following amio bolus, if any further reoccurence will need to stop Plavix and start Eliquis         4. TRENT  --Resolved, Scr 1.0 today   --SPA 7/11  --reinserted urinary catheter for retention -  removed 7/15        5. Expected acute pulmonary insufficiency in the setting following surgery  --wean oxygen to keep SpO2 greater than or equal to 92%  --continue duonebs with ezpap  --encourage C&DB, SMI, pep/flutter  --currently on 1L O2/NC  --diurese again today - 1mg bumex         6. Hx DM Type 2  --hgA1c 6.4  --home rx: metformin; resume with improvement in oral intake  --continue SSI/nightly lantus--up titrate lantus t7/12 and continue to monitor daily for any further needed adjustments   --metformin resumed 7/14       7. ASA allergy  --completed desensitization on 7/6/24  --tolerating ASA        8. Hyperkalemia  --resolved   --lokelma x 2 doses 7/11  --K4.6 this AM         9. Leukocytosis  --afebrile  --WBC count 14.1k today   --continue to trend daily   --empiric zosyn added 7/11(day 6) -- stop tomorrow         10. Hx thrombocythemia  --hematology following   --on hydroxyurea 500mg BID  --plt count stable -- 595k today         11. Urinary retention  --unable to void post urinary catheter removal  --urinary catheter re-inserted 7/11  --catheter removed 7/15 -- voiding without difficulty  --continue urecholine         12.  Acute Post Operative Pain   --Scheduled tylenol, lidocaine patch; PRN IV Dilaudid, encourage use of PO PRN oxycodone for pain management; ibuprofen as appropriate        13. Constipation--expected delayed return of bowel function  --secondary to anesthesia, narcotics, decreased oral intake, and decreased physical activity   --resolved, (+) BM 7/16  --Continue daily MOM/oral bisacodyl/glycolax and senna-s as ordered.   --Encouraged continued increase in oral intake and activity.         14. Expected deconditioning in the setting

## 2024-07-17 NOTE — CARE COORDINATION
Transition of care update: POD#8 CABG x 3. Wires cont. IV Zosyn 3,375mg q 8 hrs. IV bumex 1mg x 1. Chest x-ray (2 VW) ordered. Labs noted. Plan is [facility](jaky). Pre cert is pending. PASRR and ambulette form completed on 07/16. Transport envelope is with pt's soft chart. Cm/sw will follow.

## 2024-07-17 NOTE — PROGRESS NOTES
Comprehensive Nutrition Assessment    Type and Reason for Visit:  Reassess    Nutrition Recommendations/Plan:   Recommend to continue Jefe wound healing supplement BID and will start Glucerna supplement TID to help meet increased nutritional needs from surgical wound healing.           Malnutrition Assessment:  Malnutrition Status:  At risk for malnutrition (Comment) (07/17/24 4179)    Context:  Acute Illness     Findings of the 6 clinical characteristics of malnutrition:  Energy Intake:  50% or less of estimated energy requirements for 5 or more days  Weight Loss:  No significant weight loss     Body Fat Loss:  Unable to assess     Muscle Mass Loss:  Unable to assess    Fluid Accumulation:  No significant fluid accumulation     Strength:  Not Performed    Nutrition Assessment:    Patients po intake has been sporadic and decreased, averaging 25-50% of meals served ; adm w/ chest pain ; noted CAD ; s/p cardiac cath on 7/3 ; s/p CABG x 3 on 7/9 ; TRENT resolved ; hx of DM/essential thrombocytopenia/GERD/arthritis/PCOS/former smoker ; will provide updated recommendations    Nutrition Related Findings:    -I&Os (-2.9 L), 1+ edema, redness to buttocks/heels, missing teeth, A&O x 4, active BS, abrasions, decreased appetite ; Wound Type: Multiple, Surgical Incision       Current Nutrition Intake & Therapies:    Average Meal Intake: 26-50%  Average Supplements Intake: 26-50%  ADULT DIET; Regular; 4 carb choices (60 gm/meal); Low Fat/Low Chol/High Fiber/GERMAN  ADULT ORAL NUTRITION SUPPLEMENT; Breakfast, Dinner; Wound Healing Oral Supplement    Anthropometric Measures:  Height: 165.1 cm (5' 5\")  Ideal Body Weight (IBW): 125 lbs (57 kg)    Admission Body Weight: 92.5 kg (204 lb) (7/8 standing scale)  Current Body Weight: 94.8 kg (209 lb) (7/17, standing scale), 167.2 % IBW.    Current BMI (kg/m2): 34.8  Usual Body Weight: 89.8 kg (198 lb) (11/2023 actual per EMR)  % Weight Change (Calculated): 3                    BMI

## 2024-07-17 NOTE — PATIENT CARE CONFERENCE
[facility] Quality Flow/Interdisciplinary Rounds Progress Note        Quality Flow Rounds held on July 17, 2024    Disciplines Attending:  Bedside Nurse, , , and Nursing Unit Leadership    Sherie Guerrero was admitted on 7/1/2024  7:56 PM    Anticipated Discharge Date:       Disposition:    Joe Score:  Joe Scale Score: 19    Readmission Risk              Risk of Unplanned Readmission:  22           Discussed patient goal for the day, patient clinical progression, and barriers to discharge.  The following Goal(s) of the Day/Commitment(s) have been identified:  discharge planning/ambulate      Anamika Palmer RN  July 17, 2024

## 2024-07-17 NOTE — PROGRESS NOTES
XR CHEST PORTABLE   Final Result   1. Postsurgical changes.   2. Low lung volumes with central and bibasilar subsegmental atelectasis.   Possible mild pulmonary edema.         Vascular upper extremity arterial segmental pressures with PPG   Final Result   1. Abnormal diminished flow in the digits of the left hand after arterial   compression.         Vascular ankle brachial index (KAIT)   Final Result   No significant lower extremity peripheral arterial disease by ankle-brachial   indices.         Vascular duplex vein mapping lower bilateral   Final Result   Right greater saphenous vein is patent with measurements ranging from 2.5 -   0.9 mm proximal to distal as discussed above.      Left greater saphenous vein is patent with measurements ranging from 2.6 -   1.4 mm proximal to distal as discussed above.         Vascular duplex carotid bilateral   Final Result   The right internal carotid artery demonstrates 0-50% stenosis.      The left internal carotid artery demonstrates 0-50% stenosis.      Bilateral vertebral arteries are patent with flow in the normal direction.         CT CHEST WO CONTRAST   Final Result   1. No sign of aneurysmal dilatation of the thoracic aorta.   2. Mild coronary calcification and mild calcification of the aortic valve   annulus.   3. Small nonobstructive calculus in the upper pole of the left kidney,   incompletely examined.         XR CHEST PORTABLE   Final Result   No acute cardiopulmonary disease.             Assessment:    Principal Problem:    Chest pain  Active Problems:    Primary hypertension    Type 2 diabetes mellitus without complication, without long-term current use of insulin (HCC)    Elevated troponin    Pure hypercholesterolemia    Moderate obesity    CAD (coronary artery disease)    CAD in native artery    Aspirin allergy    ACS (acute coronary syndrome) ([facility])    Acute postoperative respiratory insufficiency    Acute post-operative pain  Resolved Problems:    * No  resolved hospital problems. *      Plan:  Check chest x-ray and proBNP  Currently only on 1 L of oxygen    Art Blue MD  12:24 PM  7/17/2024    NOTE: This report was transcribed using voice recognition software. Every effort was made to ensure accuracy; however, inadvertent transcription errors may be present

## 2024-07-17 NOTE — PROGRESS NOTES
[facility]  Hematology/Oncology  Consult      Patient Name: Sherie Guerrero  YOB: 1960  PCP: Ovidio Avery MD   Referring Provider:      Reason for Consultation:   Chief Complaint   Patient presents with    Chest Pain     Patient c/o midsternal chest pain intermittently throughout the day. Denies SOB or dizziness.        History of Present Illness:    This is a 63-year-old female patient following with Dr. Silva for JAK2 negative ET. She is on treatment with Hydrea 500 mg BID.  She does not take ASA 81 mg as she is allergic. Platelets on 6/6/24 were 493. WBC and Hgb WNL. MCV elevated and stable from hydrea (120). Plan was to continue current dosing of Hydrea. EGD 3/1 reportedly normal. She was to return to the clinic in 3 months.     Patient presented to the ED for evaluation of chest pain. Her troponins and ProBNP were slightly elevated but a stress test was positive for inferior ischemia. CTS was consulted and she underwent an urgent LHC which demonstrated severe MV CAD. Because of her LHC and unstable angina, it was thought that she would benefit from revascularization during this hospital stay. Recommend full preoperative work-up including TTE in prep for CABG 7/9/24. She may need aspirin desensitization protocol, discussed with patient. CBC with ., platelets 563. WBC and Hgb WN Consultation for any special recommendations for CPB, post-op care with a history of essential thrombocythemia.    Review of systems: Over 10 systems were reviewed and all were negative except as mention above.      Diagnostic Data:     Past Medical History:   Diagnosis Date    Diabetes (HCC)     Hernia     Hypertension     Obesity     PCOS (polycystic ovarian syndrome)        Patient Active Problem List    Diagnosis Date Noted    Acute postoperative respiratory insufficiency 07/09/2024    Acute post-operative pain 07/09/2024    ACS (acute coronary syndrome) (HCC) 07/08/2024    Aspirin  not take ASA 81 mg as she is allergic. Platelets on 6/6/24 were 493. WBC and Hgb WNL. MCV elevated and stable from hydrea (120). Plan was to continue current dosing of Hydrea. EGD 3/1 reportedly normal. She was to return to the clinic in 3 months.     - Chest pain. Her troponins and ProBNP were slightly elevated but a stress test was positive for inferior ischemia.   - CTS was consulted and she underwent an urgent LHC which demonstrated severe MV CAD. Because of her LHC and unstable angina, it was thought that she would benefit from revascularization during this hospital stay. Recommend full preoperative work-up including TTE in prep for CABG 7/9/24. She may need aspirin desensitization protocol, discussed with patient.   - CBC with , platelets 563. WBC and Hgb WN       Attending addendum:  63 years old female with JAK2 negative essential thrombocythemia on hydroxyurea, (reports allergy to aspirin) admitted with unstable angina with multivessel coronary artery disease noted on cardiac cath, plan for CABG.    Platelets 663,000.  Worsening thrombocytosis probably related to stress from unstable angina.  Would attempt to improve platelet count prior to surgery to decrease chances of perioperative thrombosis.  Increase Hydrea to 1 g twice daily.  Will continue to follow.    7/4/24  - JAK2 negative essential thrombocythemia on hydroxyurea, (reports allergy to aspirin)   - Cardiology/CTS following for unstable angina with multivessel coronary artery disease noted on cardiac cath, plan for CABG.  - Hydrea was increased to attempt to improve platelet count prior to surgery to decrease chances of perioperative thrombosis.  Plt today 565k, improving.   - Continue current dose of Hydrea1 g twice daily.   -Surgery scheduled on Tuesday.   - Will continue to follow.    7/5/24  - JAK2 negative essential thrombocythemia on hydroxyurea, (reports allergy to aspirin)   - Cardiology/CTS following for unstable angina with

## 2024-07-17 NOTE — PLAN OF CARE
Problem: Chronic Conditions and Co-morbidities  Goal: Patient's chronic conditions and co-morbidity symptoms are monitored and maintained or improved  Outcome: Progressing  Flowsheets (Taken 7/16/2024 2000)  Care Plan - Patient's Chronic Conditions and Co-Morbidity Symptoms are Monitored and Maintained or Improved: Monitor and assess patient's chronic conditions and comorbid symptoms for stability, deterioration, or improvement     Problem: Safety - Adult  Goal: Free from fall injury  Outcome: Progressing     Problem: ABCDS Injury Assessment  Goal: Absence of physical injury  Outcome: Progressing     Problem: Pain  Goal: Verbalizes/displays adequate comfort level or baseline comfort level  Outcome: Progressing     Problem: Discharge Planning  Goal: Discharge to home or other facility with appropriate resources  Outcome: Progressing  Flowsheets (Taken 7/16/2024 2000)  Discharge to home or other facility with appropriate resources: Identify barriers to discharge with patient and caregiver     Problem: Metabolic/Fluid and Electrolytes - Adult  Goal: Electrolytes maintained within normal limits  Outcome: Progressing  Flowsheets (Taken 7/16/2024 2000)  Electrolytes maintained within normal limits: Monitor labs and assess patient for signs and symptoms of electrolyte imbalances  Goal: Hemodynamic stability and optimal renal function maintained  Outcome: Progressing  Flowsheets (Taken 7/16/2024 2000)  Hemodynamic stability and optimal renal function maintained: Monitor labs and assess for signs and symptoms of volume excess or deficit  Goal: Glucose maintained within prescribed range  Outcome: Progressing  Flowsheets (Taken 7/16/2024 2000)  Glucose maintained within prescribed range: Monitor blood glucose as ordered     Problem: Hematologic - Adult  Goal: Maintains hematologic stability  Outcome: Progressing  Flowsheets (Taken 7/16/2024 2000)  Maintains hematologic stability: Assess for signs and symptoms of bleeding or  2000)  Skin Integrity Remains Intact: Monitor for areas of redness and/or skin breakdown  Goal: Incisions, wounds, or drain sites healing without S/S of infection  Outcome: Progressing  Flowsheets (Taken 7/16/2024 2000)  Incisions, Wounds, or Drain Sites Healing Without Sign and Symptoms of Infection: TWICE DAILY: Assess and document skin integrity  Goal: Oral mucous membranes remain intact  Outcome: Progressing  Flowsheets (Taken 7/16/2024 2000)  Oral Mucous Membranes Remain Intact: Assess oral mucosa and hygiene practices     Problem: Musculoskeletal - Adult  Goal: Return mobility to safest level of function  Outcome: Progressing  Flowsheets (Taken 7/16/2024 2000)  Return Mobility to Safest Level of Function: Assess patient stability and activity tolerance for standing, transferring and ambulating with or without assistive devices  Goal: Maintain proper alignment of affected body part  Outcome: Progressing  Flowsheets (Taken 7/16/2024 2000)  Maintain proper alignment of affected body part: Support and protect limb and body alignment per provider's orders  Goal: Return ADL status to a safe level of function  Outcome: Progressing  Flowsheets (Taken 7/16/2024 2000)  Return ADL Status to a Safe Level of Function: Administer medication as ordered     Problem: Skin/Tissue Integrity  Goal: Absence of new skin breakdown  Description: 1.  Monitor for areas of redness and/or skin breakdown  2.  Assess vascular access sites hourly  3.  Every 4-6 hours minimum:  Change oxygen saturation probe site  4.  Every 4-6 hours:  If on nasal continuous positive airway pressure, respiratory therapy assess nares and determine need for appliance change or resting period.  Outcome: Progressing     Problem: Nutrition Deficit:  Goal: Optimize nutritional status  Outcome: Progressing

## 2024-07-18 LAB
ANION GAP SERPL CALCULATED.3IONS-SCNC: 12 MMOL/L (ref 7–16)
BUN SERPL-MCNC: 27 MG/DL (ref 6–23)
CALCIUM SERPL-MCNC: 9 MG/DL (ref 8.6–10.2)
CHLORIDE SERPL-SCNC: 102 MMOL/L (ref 98–107)
CO2 SERPL-SCNC: 27 MMOL/L (ref 22–29)
CREAT SERPL-MCNC: 1 MG/DL (ref 0.5–1)
ERYTHROCYTE [DISTWIDTH] IN BLOOD BY AUTOMATED COUNT: 13.1 % (ref 11.5–15)
GFR, ESTIMATED: 63 ML/MIN/1.73M2
GLUCOSE BLD-MCNC: 113 MG/DL (ref 74–99)
GLUCOSE BLD-MCNC: 155 MG/DL (ref 74–99)
GLUCOSE BLD-MCNC: 156 MG/DL (ref 74–99)
GLUCOSE BLD-MCNC: 194 MG/DL (ref 74–99)
GLUCOSE SERPL-MCNC: 109 MG/DL (ref 74–99)
HCT VFR BLD AUTO: 28.7 % (ref 34–48)
HGB BLD-MCNC: 9.4 G/DL (ref 11.5–15.5)
MAGNESIUM SERPL-MCNC: 2.3 MG/DL (ref 1.6–2.6)
MCH RBC QN AUTO: 41.2 PG (ref 26–35)
MCHC RBC AUTO-ENTMCNC: 32.8 G/DL (ref 32–34.5)
MCV RBC AUTO: 125.9 FL (ref 80–99.9)
PLATELET # BLD AUTO: 577 K/UL (ref 130–450)
PMV BLD AUTO: 10 FL (ref 7–12)
POTASSIUM SERPL-SCNC: 4.3 MMOL/L (ref 3.5–5)
RBC # BLD AUTO: 2.28 M/UL (ref 3.5–5.5)
SODIUM SERPL-SCNC: 141 MMOL/L (ref 132–146)
WBC OTHER # BLD: 12.7 K/UL (ref 4.5–11.5)

## 2024-07-18 PROCEDURE — 97535 SELF CARE MNGMENT TRAINING: CPT

## 2024-07-18 PROCEDURE — 6370000000 HC RX 637 (ALT 250 FOR IP): Performed by: NURSE PRACTITIONER

## 2024-07-18 PROCEDURE — 2700000000 HC OXYGEN THERAPY PER DAY

## 2024-07-18 PROCEDURE — 6370000000 HC RX 637 (ALT 250 FOR IP)

## 2024-07-18 PROCEDURE — 2580000003 HC RX 258: Performed by: NURSE PRACTITIONER

## 2024-07-18 PROCEDURE — 6370000000 HC RX 637 (ALT 250 FOR IP): Performed by: PHYSICIAN ASSISTANT

## 2024-07-18 PROCEDURE — 6360000002 HC RX W HCPCS: Performed by: NURSE PRACTITIONER

## 2024-07-18 PROCEDURE — 2140000000 HC CCU INTERMEDIATE R&B

## 2024-07-18 PROCEDURE — 93798 PHYS/QHP OP CAR RHAB W/ECG: CPT

## 2024-07-18 PROCEDURE — 80048 BASIC METABOLIC PNL TOTAL CA: CPT

## 2024-07-18 PROCEDURE — 82962 GLUCOSE BLOOD TEST: CPT

## 2024-07-18 PROCEDURE — 36415 COLL VENOUS BLD VENIPUNCTURE: CPT

## 2024-07-18 PROCEDURE — 85027 COMPLETE CBC AUTOMATED: CPT

## 2024-07-18 PROCEDURE — 83735 ASSAY OF MAGNESIUM: CPT

## 2024-07-18 PROCEDURE — 6370000000 HC RX 637 (ALT 250 FOR IP): Performed by: INTERNAL MEDICINE

## 2024-07-18 RX ORDER — FOLIC ACID 1 MG/1
1 TABLET ORAL DAILY
Qty: 30 TABLET | Refills: 0
Start: 2024-07-19

## 2024-07-18 RX ORDER — ASCORBIC ACID 500 MG
500 TABLET ORAL 2 TIMES DAILY
Qty: 60 TABLET | Refills: 0
Start: 2024-07-18

## 2024-07-18 RX ORDER — FERROUS SULFATE 325(65) MG
325 TABLET ORAL 2 TIMES DAILY WITH MEALS
Qty: 60 TABLET | Refills: 0
Start: 2024-07-18

## 2024-07-18 RX ORDER — BUMETANIDE 1 MG/1
1 TABLET ORAL 2 TIMES DAILY
Status: DISCONTINUED | OUTPATIENT
Start: 2024-07-18 | End: 2024-07-19

## 2024-07-18 RX ORDER — BUMETANIDE 1 MG/1
1 TABLET ORAL 2 TIMES DAILY
Qty: 30 TABLET | Refills: 0
Start: 2024-07-18

## 2024-07-18 RX ORDER — POLYETHYLENE GLYCOL 3350 17 G/17G
17 POWDER, FOR SOLUTION ORAL DAILY
Qty: 527 G | Refills: 0
Start: 2024-07-19 | End: 2024-08-18

## 2024-07-18 RX ORDER — HYDROXYUREA 500 MG/1
1000 CAPSULE ORAL 2 TIMES DAILY
Qty: 90 CAPSULE | Refills: 0
Start: 2024-07-18 | End: 2024-08-10

## 2024-07-18 RX ORDER — AMIODARONE HYDROCHLORIDE 200 MG/1
TABLET ORAL
Qty: 28 TABLET | Refills: 0
Start: 2024-07-19 | End: 2024-08-09

## 2024-07-18 RX ORDER — AMIODARONE HYDROCHLORIDE 200 MG/1
200 TABLET ORAL 2 TIMES DAILY
Status: DISCONTINUED | OUTPATIENT
Start: 2024-07-19 | End: 2024-07-19

## 2024-07-18 RX ORDER — SENNA AND DOCUSATE SODIUM 50; 8.6 MG/1; MG/1
1 TABLET, FILM COATED ORAL 2 TIMES DAILY
Qty: 60 TABLET | Refills: 0
Start: 2024-07-18

## 2024-07-18 RX ORDER — OXYCODONE HYDROCHLORIDE AND ACETAMINOPHEN 5; 325 MG/1; MG/1
1 TABLET ORAL EVERY 6 HOURS PRN
Qty: 28 TABLET | Refills: 0 | Status: SHIPPED | OUTPATIENT
Start: 2024-07-18 | End: 2024-07-25

## 2024-07-18 RX ORDER — ASPIRIN 81 MG/1
81 TABLET ORAL DAILY
Qty: 30 TABLET | Refills: 3
Start: 2024-07-18

## 2024-07-18 RX ORDER — CLOPIDOGREL BISULFATE 75 MG/1
75 TABLET ORAL DAILY
Qty: 30 TABLET | Refills: 3
Start: 2024-07-19

## 2024-07-18 RX ORDER — LANOLIN ALCOHOL/MO/W.PET/CERES
400 CREAM (GRAM) TOPICAL DAILY
Qty: 30 TABLET | Refills: 0
Start: 2024-07-19

## 2024-07-18 RX ORDER — ENOXAPARIN SODIUM 100 MG/ML
40 INJECTION SUBCUTANEOUS DAILY
Qty: 0.4 ML | Refills: 15
Start: 2024-07-19

## 2024-07-18 RX ADMIN — METOPROLOL TARTRATE 50 MG: 50 TABLET, FILM COATED ORAL at 21:00

## 2024-07-18 RX ADMIN — Medication 400 MG: at 09:03

## 2024-07-18 RX ADMIN — METFORMIN HYDROCHLORIDE 500 MG: 500 TABLET ORAL at 09:03

## 2024-07-18 RX ADMIN — METOPROLOL TARTRATE 50 MG: 50 TABLET, FILM COATED ORAL at 09:03

## 2024-07-18 RX ADMIN — OXYCODONE 5 MG: 5 TABLET ORAL at 21:01

## 2024-07-18 RX ADMIN — HYDROXYUREA 1000 MG: 500 CAPSULE ORAL at 21:09

## 2024-07-18 RX ADMIN — ASPIRIN 81 MG: 81 TABLET, COATED ORAL at 09:03

## 2024-07-18 RX ADMIN — BUMETANIDE 1 MG: 1 TABLET ORAL at 17:20

## 2024-07-18 RX ADMIN — Medication 500 MG: at 21:01

## 2024-07-18 RX ADMIN — FERROUS SULFATE TAB 325 MG (65 MG ELEMENTAL FE) 325 MG: 325 (65 FE) TAB at 09:03

## 2024-07-18 RX ADMIN — BETHANECHOL CHLORIDE 25 MG: 25 TABLET ORAL at 09:03

## 2024-07-18 RX ADMIN — Medication 500 MG: at 09:03

## 2024-07-18 RX ADMIN — FOLIC ACID 1 MG: 1 TABLET ORAL at 09:03

## 2024-07-18 RX ADMIN — INSULIN GLARGINE 24 UNITS: 100 INJECTION, SOLUTION SUBCUTANEOUS at 21:01

## 2024-07-18 RX ADMIN — METFORMIN HYDROCHLORIDE 500 MG: 500 TABLET ORAL at 17:20

## 2024-07-18 RX ADMIN — PANTOPRAZOLE SODIUM 40 MG: 40 TABLET, DELAYED RELEASE ORAL at 06:41

## 2024-07-18 RX ADMIN — INSULIN LISPRO 2 UNITS: 100 INJECTION, SOLUTION INTRAVENOUS; SUBCUTANEOUS at 12:21

## 2024-07-18 RX ADMIN — ATORVASTATIN CALCIUM 40 MG: 40 TABLET, FILM COATED ORAL at 21:01

## 2024-07-18 RX ADMIN — BUMETANIDE 1 MG: 1 TABLET ORAL at 09:03

## 2024-07-18 RX ADMIN — CLOPIDOGREL BISULFATE 75 MG: 75 TABLET ORAL at 09:03

## 2024-07-18 RX ADMIN — AMIODARONE HYDROCHLORIDE 400 MG: 200 TABLET ORAL at 09:03

## 2024-07-18 RX ADMIN — SENNOSIDES AND DOCUSATE SODIUM 1 TABLET: 50; 8.6 TABLET ORAL at 09:03

## 2024-07-18 RX ADMIN — ENOXAPARIN SODIUM 40 MG: 100 INJECTION SUBCUTANEOUS at 09:02

## 2024-07-18 RX ADMIN — HYDROXYUREA 1000 MG: 500 CAPSULE ORAL at 09:02

## 2024-07-18 RX ADMIN — AMIODARONE HYDROCHLORIDE 400 MG: 200 TABLET ORAL at 21:00

## 2024-07-18 RX ADMIN — SENNOSIDES AND DOCUSATE SODIUM 1 TABLET: 50; 8.6 TABLET ORAL at 21:00

## 2024-07-18 RX ADMIN — BISACODYL 5 MG: 5 TABLET, COATED ORAL at 09:03

## 2024-07-18 RX ADMIN — SODIUM CHLORIDE, PRESERVATIVE FREE 10 ML: 5 INJECTION INTRAVENOUS at 09:03

## 2024-07-18 RX ADMIN — INSULIN LISPRO 4 UNITS: 100 INJECTION, SOLUTION INTRAVENOUS; SUBCUTANEOUS at 17:20

## 2024-07-18 RX ADMIN — FERROUS SULFATE TAB 325 MG (65 MG ELEMENTAL FE) 325 MG: 325 (65 FE) TAB at 17:20

## 2024-07-18 RX ADMIN — SODIUM CHLORIDE, PRESERVATIVE FREE 10 ML: 5 INJECTION INTRAVENOUS at 21:01

## 2024-07-18 ASSESSMENT — PAIN DESCRIPTION - FREQUENCY: FREQUENCY: CONTINUOUS

## 2024-07-18 ASSESSMENT — PAIN DESCRIPTION - PAIN TYPE: TYPE: SURGICAL PAIN;ACUTE PAIN

## 2024-07-18 ASSESSMENT — PAIN DESCRIPTION - DESCRIPTORS: DESCRIPTORS: ACHING;SORE;SPASM

## 2024-07-18 ASSESSMENT — PAIN SCALES - GENERAL
PAINLEVEL_OUTOF10: 5
PAINLEVEL_OUTOF10: 2

## 2024-07-18 ASSESSMENT — PAIN DESCRIPTION - LOCATION: LOCATION: CHEST

## 2024-07-18 ASSESSMENT — PAIN - FUNCTIONAL ASSESSMENT: PAIN_FUNCTIONAL_ASSESSMENT: ACTIVITIES ARE NOT PREVENTED

## 2024-07-18 ASSESSMENT — PAIN DESCRIPTION - ONSET: ONSET: ON-GOING

## 2024-07-18 NOTE — PROGRESS NOTES
Subjective:    Chief complaint:    Breathing is somewhat better  Denies new complaints    Objective:    BP (!) 131/59   Pulse 69   Temp 96.9 °F (36.1 °C) (Temporal)   Resp 19   Ht 1.651 m (5' 5\")   Wt 94.1 kg (207 lb 6.4 oz)   SpO2 97%   BMI 34.51 kg/m²   General : Awake ,alert,no distress.  Heart:  RRR, no murmurs, gallops, or rubs.  Lungs:  CTA bilaterally, no wheeze, rales or rhonchi  Abd: bowel sounds present, nontender, nondistended, no masses  Extrem:  No clubbing, cyanosis, or edema    CBC:   Lab Results   Component Value Date/Time    WBC 12.7 07/18/2024 06:18 AM    RBC 2.28 07/18/2024 06:18 AM    HGB 9.4 07/18/2024 06:18 AM    HCT 28.7 07/18/2024 06:18 AM    .9 07/18/2024 06:18 AM    MCH 41.2 07/18/2024 06:18 AM    MCHC 32.8 07/18/2024 06:18 AM    RDW 13.1 07/18/2024 06:18 AM     07/18/2024 06:18 AM    MPV 10.0 07/18/2024 06:18 AM     BMP:    Lab Results   Component Value Date/Time     07/18/2024 06:18 AM    K 4.3 07/18/2024 06:18 AM     07/18/2024 06:18 AM    CO2 27 07/18/2024 06:18 AM    BUN 27 07/18/2024 06:18 AM    CREATININE 1.0 07/18/2024 06:18 AM    CALCIUM 9.0 07/18/2024 06:18 AM    LABGLOM 63 07/18/2024 06:18 AM    LABGLOM >60 12/18/2023 02:00 PM    GLUCOSE 109 07/18/2024 06:18 AM    GLUCOSE 104 11/23/2010 09:48 AM     PT/INR:    Lab Results   Component Value Date/Time    PROTIME 13.0 07/10/2024 03:51 AM    INR 1.2 07/10/2024 03:51 AM     Troponin:  No results found for: \"TROPONINI\"    No results for input(s): \"LABURIN\" in the last 72 hours.  No results for input(s): \"BC\" in the last 72 hours.  No results for input(s): \"BLOODCULT2\" in the last 72 hours.      Current Facility-Administered Medications:     bumetanide (BUMEX) tablet 1 mg, 1 mg, Oral, BID, Houck, Carla A, APRN - CNP, 1 mg at 07/18/24 0903    [START ON 7/19/2024] amiodarone (CORDARONE) tablet 200 mg, 200 mg, Oral, BID, Eamon, Carla A, APRN - CNP    hydroxyurea (HYDREA) chemo capsule 1,000 mg, 1,000 mg,  Oral, BID, Dru Rodriguez MD, 1,000 mg at 07/18/24 0902    tiZANidine (ZANAFLEX) tablet 4 mg, 4 mg, Oral, Q6H PRN, Art Blue MD    metoprolol tartrate (LOPRESSOR) tablet 50 mg, 50 mg, Oral, BID, Sandra Valenzuela PA-C, 50 mg at 07/18/24 0903    metFORMIN (GLUCOPHAGE) tablet 500 mg, 500 mg, Oral, BID WC, Sandra Valenzuela PA-C, 500 mg at 07/18/24 0903    insulin glargine (LANTUS) injection vial 24 Units, 24 Units, SubCUTAneous, Nightly, Sandra Valenzuela PA-C, 24 Units at 07/17/24 2122    ipratropium 0.5 mg-albuterol 2.5 mg (DUONEB) nebulizer solution 1 Dose, 1 Dose, Inhalation, Q4H PRN, Art Blue MD    enoxaparin (LOVENOX) injection 40 mg, 40 mg, SubCUTAneous, Daily, Mar Salas APRN - CNP, 40 mg at 07/18/24 0902    amiodarone (CORDARONE) tablet 400 mg, 400 mg, Oral, BID, Carla Knutson APRN - CNP, 400 mg at 07/18/24 0903    bethanechol (URECHOLINE) tablet 25 mg, 25 mg, Oral, TID, Mar Salas APRN - CNP, 25 mg at 07/18/24 0903    insulin lispro (HUMALOG,ADMELOG) injection vial 0-16 Units, 0-16 Units, SubCUTAneous, TID LATANYA, Linnea Garcia APRN - CNP, 4 Units at 07/17/24 1217    insulin lispro (HUMALOG,ADMELOG) injection vial 0-4 Units, 0-4 Units, SubCUTAneous, Nightly, Linnea Garcia APRN - CNP, 4 Units at 07/13/24 1957    acetaminophen (TYLENOL) tablet 650 mg, 650 mg, Oral, Q4H PRN, Linnea Garcia APRN - CNP    bisacodyl (DULCOLAX) EC tablet 5 mg, 5 mg, Oral, Daily, Linnea Garcia APRN - CNP, 5 mg at 07/18/24 0903    sennosides-docusate sodium (SENOKOT-S) 8.6-50 MG tablet 1 tablet, 1 tablet, Oral, BID, Linnea Garcia APRN - CNP, 1 tablet at 07/18/24 0903    magnesium hydroxide (MILK OF MAGNESIA) 400 MG/5ML suspension 30 mL, 30 mL, Oral, Daily, Linnea Garcia APRN - CNP, 30 mL at 07/14/24 0831    amiodarone (CORDARONE) tablet 400 mg, 400 mg, Oral, PRN, Linnea Garcia APRN - CNP    diphenhydrAMINE (BENADRYL) tablet 25 mg, 25 mg, Oral, Q8H PRN, Linnea Garcia,

## 2024-07-18 NOTE — PATIENT CARE CONFERENCE
[facility] Quality Flow/Interdisciplinary Rounds Progress Note        Quality Flow Rounds held on July 18, 2024    Disciplines Attending:  Bedside Nurse, , , and Nursing Unit Leadership    Sherie Guerrero was admitted on 7/1/2024  7:56 PM    Anticipated Discharge Date:       Disposition:    Joe Score:  Joe Scale Score: 19    Readmission Risk              Risk of Unplanned Readmission:  22           Discussed patient goal for the day, patient clinical progression, and barriers to discharge.  The following Goal(s) of the Day/Commitment(s) have been identified:  ambulate/discharge planning       Anamika Palmer RN  July 18, 2024

## 2024-07-18 NOTE — CARE COORDINATION
Transition of care update: POD#9 CABG x 3. Wires cut. Wbc 12.7, platelets 577 and other labs noted. O2 at 1l/nc. Plan is [facility]) when medically ready. Met with pt's future daughter in law in room earlier today. Pt was up in chair with eyes closed. Updated her on dc plan.  PASRR and ambulette form completed on 07/16. Transport envelope is with [facility] soft chart. [facility]. Cm/sw will follow.

## 2024-07-18 NOTE — PROGRESS NOTES
POD#9 Awake, alert. No complaints. Denies CP, palpitations, SOB at rest, dizziness/lightheadedness.    Vitals:    07/17/24 2337 07/18/24 0224 07/18/24 0322 07/18/24 0746   BP: (!) 142/62  130/61 (!) 131/59   Pulse: 69  66 62   Resp: 18  17 19   Temp: 96.9 °F (36.1 °C)  97.8 °F (36.6 °C) 96.9 °F (36.1 °C)   TempSrc: Temporal  Temporal Temporal   SpO2: 96%  94% 97%   Weight:  94.1 kg (207 lb 6.4 oz)     Height:         O2: 1-2L/NC 95%      Intake/Output Summary (Last 24 hours) at 7/18/2024 0859  Last data filed at 7/18/2024 0600  Gross per 24 hour   Intake 600 ml   Output 1900 ml   Net -1300 ml         +BM on 7/17    UO: 500mL/8hr       Recent Labs     07/16/24  0550 07/17/24  0647 07/18/24  0618   WBC 14.5* 14.1* 12.7*   HGB 9.2* 10.2* 9.4*   HCT 29.4* 31.4* 28.7*   * 595* 577*        Recent Labs     07/16/24  0836 07/17/24  0647 07/18/24  0618   BUN 26* 24* 27*   CREATININE 1.0 1.0 1.0           Telemetry: SR    PE  Cardiac: RRR  Lungs: decreased bases  Chest incision C/D/I, approximated ,no signs of infection. Sternum stable. Surgical bra on. Prior chest tube site incisions C/D/I, no erythema with intact sutures.  Epicardial pacing wires present and secure.   Abd: Soft, nontender, +BS, +flatus  Ext: Incisions C/D/I, approximated, no erythema, + edema        A/P: POD#9       1. MVCAD  --Stable s/p urgent sternotomy, urgent CABG x3 (LIMA-LAD, SVG-RCA, SVG-diag), EVH, left atrial appendage ligation with 35mm Atriclip on 7/9/2024  --Post op KIARA reveals 60% EF  --DAPT/statin/BB-  --reinforce sternal precautions  --epicardial pacing wires cut without difficulty, patient tolerated well.   --All CTs out           2. Expected acute blood loss anemia secondary to open heart surgery  --stable; hgb 9.4        3. PAF  --Afib RVR am of 7/14, amio bolus given and amio gtt initiated  --continue BB with hold parameters - on 50mg metoptolol BID   --Continue PO amio with plans to taper on DC   --Contine Plavix as this is first

## 2024-07-18 NOTE — DISCHARGE INSTRUCTIONS
Discharge Instructions for Open Heart Surgery    What you will need at home:               * Accurate Scale               *  Digital Thermometer               *  Antibacterial Soap                *  Clean Wash Cloths             *  Someone to be with you for one week              DO NOT LIFT, PUSH, OR PULL ANYTHING OVER 5 POUNDS FOR 8 WEEK from the day of surgery. This could prevent your sternum from healing properly.                    ?When you are given permission from your surgeon to begin lifting again,                     do so gradually. You will need time to build your muscle strength.                  ? A gallon of milk is more than 5 lbs. you should buy ½ gallons.    NEVER SMOKE AGAIN!  Absolutely no tobacco products!  Do not allow others to smoke around you.  Second hand smoke can be just as bad.  The [employer] has a free program available, call 1-346.173.8694.      Activity: See your activity diary in your binder for your walking plan.  Plan to walk indoors on days the temperature is below 40? or over 80? or during smog alerts.  At first limit your stair climbing to once or twice a day.  You may use the handrail for balance only.  Do not pull yourself up with it.  It is not unusual to feel tired for the first few weeks, but walking builds up your strength.    Driving: Do not drive a car or any vehicle for 4 weeks from the day of surgery.  You can not drive a truck, tractor or  for 8 weeks from the day of surgery.  After 4 weeks, you can drive vehicles with power steering only.  Do not drive while on pain medication.    Incentive Spirometer:  Continue to use your lung exerciser for the next 4 weeks.  Support your chest and cough each time you complete the 10 exercises.    Weight:  Weigh yourself every morning.  Call the surgeon if you gain 3 pounds or more overnight or 5 pounds in a week.  This may be a sign of fluid retention.    Medication:    Pain pills are ordered to keep you

## 2024-07-18 NOTE — PROGRESS NOTES
[facility]  Hematology/Oncology  Consult      Patient Name: Sherie Guerrero  YOB: 1960  PCP: Ovidio Avery MD   Referring Provider:      Reason for Consultation:   Chief Complaint   Patient presents with    Chest Pain     Patient c/o midsternal chest pain intermittently throughout the day. Denies SOB or dizziness.        History of Present Illness:    This is a 63-year-old female patient following with Dr. Silva for JAK2 negative ET. She is on treatment with Hydrea 500 mg BID.  She does not take ASA 81 mg as she is allergic. Platelets on 6/6/24 were 493. WBC and Hgb WNL. MCV elevated and stable from hydrea (120). Plan was to continue current dosing of Hydrea. EGD 3/1 reportedly normal. She was to return to the clinic in 3 months.     Patient presented to the ED for evaluation of chest pain. Her troponins and ProBNP were slightly elevated but a stress test was positive for inferior ischemia. CTS was consulted and she underwent an urgent LHC which demonstrated severe MV CAD. Because of her LHC and unstable angina, it was thought that she would benefit from revascularization during this hospital stay. Recommend full preoperative work-up including TTE in prep for CABG 7/9/24. She may need aspirin desensitization protocol, discussed with patient. CBC with ., platelets 563. WBC and Hgb WN Consultation for any special recommendations for CPB, post-op care with a history of essential thrombocythemia.    Review of systems: Over 10 systems were reviewed and all were negative except as mention above.      Diagnostic Data:     Past Medical History:   Diagnosis Date    Diabetes (HCC)     Hernia     Hypertension     Obesity     PCOS (polycystic ovarian syndrome)        Patient Active Problem List    Diagnosis Date Noted    Acute postoperative respiratory insufficiency 07/09/2024    Acute post-operative pain 07/09/2024    ACS (acute coronary syndrome) (HCC) 07/08/2024    Aspirin  negative essential thrombocythemia on hydroxyurea, (reports allergy to aspirin) admitted with unstable angina with multivessel coronary artery disease noted on cardiac cath, plan for CABG.    Platelets 663,000.  Worsening thrombocytosis probably related to stress from unstable angina.  Would attempt to improve platelet count prior to surgery to decrease chances of perioperative thrombosis.  Increase Hydrea to 1 g twice daily.  Will continue to follow.    7/4/24  - JAK2 negative essential thrombocythemia on hydroxyurea, (reports allergy to aspirin)   - Cardiology/CTS following for unstable angina with multivessel coronary artery disease noted on cardiac cath, plan for CABG.  - Hydrea was increased to attempt to improve platelet count prior to surgery to decrease chances of perioperative thrombosis.  Plt today 565k, improving.   - Continue current dose of Hydrea1 g twice daily.   -Surgery scheduled on Tuesday.   - Will continue to follow.    7/5/24  - JAK2 negative essential thrombocythemia on hydroxyurea, (reports allergy to aspirin)   - Cardiology/CTS following for unstable angina with multivessel coronary artery disease noted on cardiac cath, plan for CABG.  - Hydrea was increased to attempt to improve platelet count prior to surgery to decrease chances of perioperative thrombosis.  Plt today 436, improving. Reduce Hydrea dose to 500 mg bid.  -Surgery scheduled on Tuesday.   - Will continue to follow.     7/7/24  - JAK2 negative essential thrombocythemia on hydroxyurea, (reports allergy to aspirin)   - Cardiology/CTS following for unstable angina with multivessel coronary artery disease noted on cardiac cath, plan for CABG.  - Hydrea was increased to attempt to improve platelet count prior to surgery to decrease chances of perioperative thrombosis. Hydrea dose reduced back to 500 mg bid 7/5. Platelets continue to improve 426.  -Surgery scheduled on Tuesday.  Hold hydroxyurea in perioperative phase-24 hours before

## 2024-07-18 NOTE — PLAN OF CARE
Problem: Chronic Conditions and Co-morbidities  Goal: Patient's chronic conditions and co-morbidity symptoms are monitored and maintained or improved  Outcome: Progressing  Flowsheets (Taken 7/17/2024 2020)  Care Plan - Patient's Chronic Conditions and Co-Morbidity Symptoms are Monitored and Maintained or Improved: Monitor and assess patient's chronic conditions and comorbid symptoms for stability, deterioration, or improvement     Problem: Safety - Adult  Goal: Free from fall injury  Outcome: Progressing     Problem: ABCDS Injury Assessment  Goal: Absence of physical injury  Outcome: Progressing     Problem: Pain  Goal: Verbalizes/displays adequate comfort level or baseline comfort level  Outcome: Progressing     Problem: Discharge Planning  Goal: Discharge to home or other facility with appropriate resources  Outcome: Progressing  Flowsheets (Taken 7/17/2024 2020)  Discharge to home or other facility with appropriate resources: Identify barriers to discharge with patient and caregiver     Problem: Metabolic/Fluid and Electrolytes - Adult  Goal: Electrolytes maintained within normal limits  Outcome: Progressing  Flowsheets (Taken 7/17/2024 2020)  Electrolytes maintained within normal limits: Monitor labs and assess patient for signs and symptoms of electrolyte imbalances  Goal: Hemodynamic stability and optimal renal function maintained  Outcome: Progressing  Flowsheets (Taken 7/17/2024 2020)  Hemodynamic stability and optimal renal function maintained: Monitor labs and assess for signs and symptoms of volume excess or deficit  Goal: Glucose maintained within prescribed range  Outcome: Progressing  Flowsheets (Taken 7/17/2024 2020)  Glucose maintained within prescribed range: Monitor blood glucose as ordered     Problem: Hematologic - Adult  Goal: Maintains hematologic stability  Outcome: Progressing  Flowsheets (Taken 7/17/2024 2020)  Maintains hematologic stability: Assess for signs and symptoms of bleeding or  hemorrhage     Problem: Neurosensory - Adult  Goal: Achieves stable or improved neurological status  Outcome: Progressing  Flowsheets (Taken 7/17/2024 2020)  Achieves stable or improved neurological status: Assess for and report changes in neurological status     Problem: Cardiovascular - Adult  Goal: Maintains optimal cardiac output and hemodynamic stability  Outcome: Progressing  Flowsheets (Taken 7/17/2024 2020)  Maintains optimal cardiac output and hemodynamic stability: Monitor blood pressure and heart rate  Goal: Absence of cardiac dysrhythmias or at baseline  Outcome: Progressing  Flowsheets (Taken 7/17/2024 2020)  Absence of cardiac dysrhythmias or at baseline: Monitor cardiac rate and rhythm     Problem: Respiratory - Adult  Goal: Achieves optimal ventilation and oxygenation  Outcome: Progressing     Problem: Gastrointestinal - Adult  Goal: Minimal or absence of nausea and vomiting  Outcome: Progressing  Flowsheets (Taken 7/17/2024 2020)  Minimal or absence of nausea and vomiting: Administer IV fluids as ordered to ensure adequate hydration  Goal: Maintains or returns to baseline bowel function  Outcome: Progressing  Flowsheets (Taken 7/17/2024 2020)  Maintains or returns to baseline bowel function: Assess bowel function  Goal: Maintains adequate nutritional intake  Outcome: Progressing  Flowsheets (Taken 7/17/2024 2020)  Maintains adequate nutritional intake: Monitor percentage of each meal consumed     Problem: Genitourinary - Adult  Goal: Absence of urinary retention  Outcome: Progressing  Flowsheets (Taken 7/17/2024 2020)  Absence of urinary retention: Assess patient’s ability to void and empty bladder  Goal: Urinary catheter remains patent  Outcome: Progressing  Flowsheets (Taken 7/17/2024 2020)  Urinary catheter remains patent: Assess patency of urinary catheter     Problem: Skin/Tissue Integrity - Adult  Goal: Skin integrity remains intact  Outcome: Progressing  Flowsheets (Taken 7/17/2024

## 2024-07-18 NOTE — PROGRESS NOTES
Occupational Therapy  OT BEDSIDE TREATMENT NOTE   TRU [facility]  [address] [place], [place]        Date:2024  Patient Name: Sherie Guerrero  MRN: [id number]  : 1960  Room: [address] [place] [address]A     Per OT Eval:    Evaluating OT: Iqra Vázquez, OTR/L [id number]     Referring Provider:   izing   Radha Claros PA       Specific Provider Orders/Date: OT eval and treat (24)        Diagnosis: Chest pain [R07.9]  ACS (acute coronary syndrome) (HCC) [I24.9]         Surgery/Procedures:    urgent sternotomy, urgent CABG x3 (LIMA-LAD, SVG-RCA, SVG-diag), EVH, left atrial appendage ligation with 35mm Atriclip      Pertinent Medical History:    Past Medical History        Past Medical History:   Diagnosis Date    Diabetes (HCC)      Hernia      Hypertension      Obesity      PCOS (polycystic ovarian syndrome)           *Precautions:  Fall Risk, sternal, O2, chest tubes x 2, deaf in R ear     Assessment of current deficits   [x]Functional mobility            [x]ADLs           [x]Strength                  []Cognition  [x]Functional transfers          [x]IADLs          [x]Safety Awareness   [x]Endurance  []Fine Motor Coordination   [x]Balance       []Vision/perception    []Sensation      []Gross Motor Coordination [x]ROM           []Delirium                  [] Motor Control     []Communication      OT PLAN OF CARE   OT POC based on physician orders, patient diagnosis and results of clinical assessment.        Frequency/Duration: 1-3 days/wk for 1-2 weeks PRN     Specific OT Treatment to include:   ADL retraining/adapted techniques and AE recommendations to increase functional independence within precautions                    Energy conservation techniques to improve tolerance for selfcare routine   Functional transfer/mobility training/DME recommendations for increased independence, safety and fall prevention         Patient/family education to increase

## 2024-07-19 VITALS
SYSTOLIC BLOOD PRESSURE: 120 MMHG | BODY MASS INDEX: 34.45 KG/M2 | RESPIRATION RATE: 18 BRPM | HEIGHT: 65 IN | HEART RATE: 71 BPM | DIASTOLIC BLOOD PRESSURE: 78 MMHG | TEMPERATURE: 97 F | WEIGHT: 206.8 LBS | OXYGEN SATURATION: 98 %

## 2024-07-19 LAB
ANION GAP SERPL CALCULATED.3IONS-SCNC: 11 MMOL/L (ref 7–16)
BUN SERPL-MCNC: 24 MG/DL (ref 6–23)
CALCIUM SERPL-MCNC: 8.5 MG/DL (ref 8.6–10.2)
CHLORIDE SERPL-SCNC: 103 MMOL/L (ref 98–107)
CO2 SERPL-SCNC: 23 MMOL/L (ref 22–29)
CREAT SERPL-MCNC: 0.9 MG/DL (ref 0.5–1)
ERYTHROCYTE [DISTWIDTH] IN BLOOD BY AUTOMATED COUNT: 13.2 % (ref 11.5–15)
GFR, ESTIMATED: 67 ML/MIN/1.73M2
GLUCOSE BLD-MCNC: 107 MG/DL (ref 74–99)
GLUCOSE BLD-MCNC: 137 MG/DL (ref 74–99)
GLUCOSE SERPL-MCNC: 103 MG/DL (ref 74–99)
HCT VFR BLD AUTO: 31.5 % (ref 34–48)
HGB BLD-MCNC: 9.9 G/DL (ref 11.5–15.5)
MAGNESIUM SERPL-MCNC: 2.2 MG/DL (ref 1.6–2.6)
MCH RBC QN AUTO: 41.6 PG (ref 26–35)
MCHC RBC AUTO-ENTMCNC: 31.4 G/DL (ref 32–34.5)
MCV RBC AUTO: 132.4 FL (ref 80–99.9)
PLATELET # BLD AUTO: 437 K/UL (ref 130–450)
PMV BLD AUTO: 10.7 FL (ref 7–12)
POTASSIUM SERPL-SCNC: 4.4 MMOL/L (ref 3.5–5)
RBC # BLD AUTO: 2.38 M/UL (ref 3.5–5.5)
SODIUM SERPL-SCNC: 137 MMOL/L (ref 132–146)
WBC OTHER # BLD: 10.4 K/UL (ref 4.5–11.5)

## 2024-07-19 PROCEDURE — 6370000000 HC RX 637 (ALT 250 FOR IP)

## 2024-07-19 PROCEDURE — 36415 COLL VENOUS BLD VENIPUNCTURE: CPT

## 2024-07-19 PROCEDURE — 83735 ASSAY OF MAGNESIUM: CPT

## 2024-07-19 PROCEDURE — 6360000002 HC RX W HCPCS: Performed by: NURSE PRACTITIONER

## 2024-07-19 PROCEDURE — 82962 GLUCOSE BLOOD TEST: CPT

## 2024-07-19 PROCEDURE — 80048 BASIC METABOLIC PNL TOTAL CA: CPT

## 2024-07-19 PROCEDURE — 6370000000 HC RX 637 (ALT 250 FOR IP): Performed by: INTERNAL MEDICINE

## 2024-07-19 PROCEDURE — 2700000000 HC OXYGEN THERAPY PER DAY

## 2024-07-19 PROCEDURE — 6370000000 HC RX 637 (ALT 250 FOR IP): Performed by: NURSE PRACTITIONER

## 2024-07-19 PROCEDURE — 6370000000 HC RX 637 (ALT 250 FOR IP): Performed by: PHYSICIAN ASSISTANT

## 2024-07-19 PROCEDURE — 97530 THERAPEUTIC ACTIVITIES: CPT

## 2024-07-19 PROCEDURE — 93798 PHYS/QHP OP CAR RHAB W/ECG: CPT

## 2024-07-19 PROCEDURE — 6360000002 HC RX W HCPCS

## 2024-07-19 PROCEDURE — 2580000003 HC RX 258: Performed by: NURSE PRACTITIONER

## 2024-07-19 PROCEDURE — 85027 COMPLETE CBC AUTOMATED: CPT

## 2024-07-19 RX ORDER — BUMETANIDE 0.25 MG/ML
2 INJECTION INTRAMUSCULAR; INTRAVENOUS ONCE
Status: COMPLETED | OUTPATIENT
Start: 2024-07-19 | End: 2024-07-19

## 2024-07-19 RX ORDER — HYDROXYUREA 500 MG/1
500 CAPSULE ORAL 2 TIMES DAILY
Qty: 43 CAPSULE | Refills: 0 | Status: SHIPPED | OUTPATIENT
Start: 2024-07-19 | End: 2024-08-10

## 2024-07-19 RX ORDER — HYDROXYUREA 500 MG/1
500 CAPSULE ORAL 2 TIMES DAILY
Status: DISCONTINUED | OUTPATIENT
Start: 2024-07-19 | End: 2024-07-19 | Stop reason: HOSPADM

## 2024-07-19 RX ADMIN — FOLIC ACID 1 MG: 1 TABLET ORAL at 11:03

## 2024-07-19 RX ADMIN — PANTOPRAZOLE SODIUM 40 MG: 40 TABLET, DELAYED RELEASE ORAL at 05:54

## 2024-07-19 RX ADMIN — ENOXAPARIN SODIUM 40 MG: 100 INJECTION SUBCUTANEOUS at 11:03

## 2024-07-19 RX ADMIN — CLOPIDOGREL BISULFATE 75 MG: 75 TABLET ORAL at 11:03

## 2024-07-19 RX ADMIN — SENNOSIDES AND DOCUSATE SODIUM 1 TABLET: 50; 8.6 TABLET ORAL at 11:04

## 2024-07-19 RX ADMIN — Medication 400 MG: at 11:03

## 2024-07-19 RX ADMIN — ASPIRIN 81 MG: 81 TABLET, COATED ORAL at 11:03

## 2024-07-19 RX ADMIN — HYDROXYUREA 1000 MG: 500 CAPSULE ORAL at 11:06

## 2024-07-19 RX ADMIN — METOPROLOL TARTRATE 50 MG: 50 TABLET, FILM COATED ORAL at 11:05

## 2024-07-19 RX ADMIN — SODIUM CHLORIDE, PRESERVATIVE FREE 10 ML: 5 INJECTION INTRAVENOUS at 11:06

## 2024-07-19 RX ADMIN — AMIODARONE HYDROCHLORIDE 200 MG: 200 TABLET ORAL at 11:00

## 2024-07-19 RX ADMIN — Medication 500 MG: at 11:03

## 2024-07-19 RX ADMIN — BUMETANIDE 2 MG: 0.25 INJECTION INTRAMUSCULAR; INTRAVENOUS at 11:05

## 2024-07-19 RX ADMIN — METFORMIN HYDROCHLORIDE 500 MG: 500 TABLET ORAL at 08:44

## 2024-07-19 RX ADMIN — BISACODYL 5 MG: 5 TABLET, COATED ORAL at 11:03

## 2024-07-19 RX ADMIN — FERROUS SULFATE TAB 325 MG (65 MG ELEMENTAL FE) 325 MG: 325 (65 FE) TAB at 08:44

## 2024-07-19 ASSESSMENT — PAIN SCALES - GENERAL
PAINLEVEL_OUTOF10: 0
PAINLEVEL_OUTOF10: 0

## 2024-07-19 ASSESSMENT — PAIN SCALES - WONG BAKER
WONGBAKER_NUMERICALRESPONSE: NO HURT
WONGBAKER_NUMERICALRESPONSE: NO HURT

## 2024-07-19 NOTE — PROGRESS NOTES
Left message with Hem-Oncology's office regarding patient's discharge. Dr. Silva's NP stated that patient is okay to discharge from their standpoint. Dr. Silva would like to modify patient's dose of HYDREA from 1000 mg to 500 mg BID.

## 2024-07-19 NOTE — PROGRESS NOTES
POD#10 Awake, alert. No complaints. Denies CP, palpitations, SOB at rest, dizziness/lightheadedness. Reports having shortness of breath when laying down for sleep at night     Vitals:    07/18/24 2332 07/19/24 0201 07/19/24 0345 07/19/24 0726   BP: 128/65  132/62 132/64   Pulse: 68  66 65   Resp: 15  18 18   Temp: 97 °F (36.1 °C)  97.2 °F (36.2 °C) 97 °F (36.1 °C)   TempSrc: Temporal  Temporal Temporal   SpO2: 97%  98% 97%   Weight:  93.8 kg (206 lb 12.8 oz)     Height:         O2: 1L/NC 95%      Intake/Output Summary (Last 24 hours) at 7/19/2024 1103  Last data filed at 7/19/2024 0944  Gross per 24 hour   Intake 360 ml   Output 1150 ml   Net -790 ml         +BM on 7/18    UO: 200mL/8hr       Recent Labs     07/17/24  0647 07/18/24  0618 07/19/24  0624   WBC 14.1* 12.7* 10.4   HGB 10.2* 9.4* 9.9*   HCT 31.4* 28.7* 31.5*   * 577* 437        Recent Labs     07/17/24  0647 07/18/24  0618 07/19/24  0624   BUN 24* 27* 24*   CREATININE 1.0 1.0 0.9           Telemetry: SR    PE  Cardiac: RRR  Lungs: decreased bases  Chest incision C/D/I, approximated ,no signs of infection. Sternum stable. Surgical bra on. Prior chest tube site incisions C/D/I, no erythema with intact sutures.  Epicardial pacing wires present and secure.   Abd: Soft, nontender, +BS, +flatus  Ext: Incisions C/D/I, approximated, no erythema, + edema        A/P: POD#10       1. MVCAD  --Stable s/p urgent sternotomy, urgent CABG x3 (LIMA-LAD, SVG-RCA, SVG-diag), EVH, left atrial appendage ligation with 35mm Atriclip on 7/9/2024  --Post op KIARA reveals 60% EF  --DAPT/statin/BB  --reinforce sternal precautions  --All CTs and wires out           2. Expected acute blood loss anemia secondary to open heart surgery  --stable; hgb 9.9        3. PAF  --Afib RVR am of 7/14, amio bolus given and amio gtt initiated  --continue BB with hold parameters - on 50mg metoptolol BID   --Continue PO amio with plans to taper on DC   --no recurrence of afib        4.  TRENT  --Resolved, Scr 0.9 today   --SPA 7/11  --reinserted urinary catheter for retention -  removed 7/15 - voiding well         5. Expected acute pulmonary insufficiency in the setting following surgery  --wean oxygen to keep SpO2 greater than or equal to 92%  --continue duonebs with ezpap  --encourage C&DB, SMI, pep/flutter  --currently on 1L O2/NC  --give 2mg IV bumex today   --poor effort on IS -- educated importance        6. Hx DM Type 2  --hgA1c 6.4  --home rx: metformin; resume with improvement in oral intake  --continue SSI/nightly lantus--up titrate lantus t7/12 and continue to monitor daily for any further needed adjustments   --metformin resumed 7/14       7. ASA allergy  --completed desensitization on 7/6/24  --tolerating ASA        8. Hyperkalemia  --resolved   --lokelma x 2 doses 7/11  --K 4.4 this AM         9. Leukocytosis -  resolved  --afebrile  --WBC count 9.9k today   --continue to trend daily   --empiric zosyn added 7/11, stopped 7/18        10. Hx thrombocythemia  --hematology following   --hydroxyurea increased to 1g BID  --plt count stable -- 437k today         11. Urinary retention  --unable to void post urinary catheter removal  --urinary catheter re-inserted 7/11  --catheter removed 7/15 -- voiding without difficulty          12.  Acute Post Operative Pain   --Scheduled tylenol, lidocaine patch; PRN IV Dilaudid, encourage use of PO PRN oxycodone for pain management; ibuprofen as appropriate        13. Constipation--expected delayed return of bowel function  --secondary to anesthesia, narcotics, decreased oral intake, and decreased physical activity   --resolved, (+) BM 7/17  --Continue daily MOM/oral bisacodyl/glycolax and senna-s as ordered.   --Encouraged continued increase in oral intake and activity.         14. Expected deconditioning in the setting following surgery  --Increase activity as tolerated  --PT/OT  --Ambulating 250ft           DVT prophylaxis:  --continue bilateral knee

## 2024-07-19 NOTE — PROGRESS NOTES
Patient's son called demanding en explanation as to why the patient is being discharged today. Patient accused RN of giving patient \"half-doses\" and \"tricking\" her into being discharged. Patient requested a member of [facility] to call him. [facility] has been notified.

## 2024-07-19 NOTE — PROGRESS NOTES
Peripheral IV removed without complication. Heart monitor removed and returned to nurse's station.

## 2024-07-19 NOTE — PROGRESS NOTES
Physical Therapy  Treatment Note    Name: Sherie Guerrero  : 1960  MRN: [id number]      Date of Service: 2024    Evaluating PT:  Long Tucker PT, DPT [id number]    Room #:  6519/6519-A  Diagnosis:  Chest pain [R07.9]  ACS (acute coronary syndrome) (HCC) [I24.9]  PMHx/PSHx:    Past Medical History:   Diagnosis Date    Diabetes (HCC)     Hernia     Hypertension     Obesity     PCOS (polycystic ovarian syndrome)      Procedure/Surgery:   CABG x 3  Precautions:  Falls, Sternal, O2, , Deaf in R ear, [place] in L ear - hearing aide  Equipment Needs:  TBD    SUBJECTIVE:    Pt will discharge to sister's 1 story home with 1+1 step(s) to enter and no rail(s).     Pt ambulated without device and was independent PTA.    OBJECTIVE:   Initial Evaluation  Date: 7/10/24 Treatment Date: 24 Short Term/ Long Term   Goals   AM-PAC 6 Clicks 10/24 14/24    Was pt agreeable to Eval/treatment? Yes Yes    Does pt have pain? -8/10 surgical pain No complaints of pain    Bed Mobility  Rolling: NT  Supine to sit: MaxA with HOB elevated  Sit to supine: NT  Scooting: MaxA Rolling: NT  Supine to sit: NT  Sit to supine: NT  Scooting: NT Timmy   Transfers Sit to stand: ModA  Stand to sit: ModA  Stand pivot: ModA no device Sit to stand: Timmy  Stand to sit: Timmy  Stand pivot: Timmy without AD Independent    Ambulation   A few steps to chair with ModA no device 75 feet x3 without AD with Timmy >400 feet Independently   Stair negotiation: ascended and descended NT NT >4 steps with 1 rail Mod Independent   ROM BUE:  Defer to OT note  BLE:  WFL NT    Strength BUE:  Defer to OT note  BLE:  4/5 NT Increase by 1/3 MMT grade   Balance Sitting EOB:  Timmy  Dynamic Standing:  ModA no device Sitting EOB: SBA  Dynamic Standing: Timmy without AD Sitting EOB:  Independent  Dynamic Standing:  Independent      Pt is A & O x: 4 to person, place, month/year, and situation.    Sensation: Pt denies numbness and tingling of extremities.   Edema:

## 2024-07-19 NOTE — PROGRESS NOTES
Patient discharged with all belongings, including atrial clip card and scripts. All questions answered and understanding verbalized by patient.

## 2024-07-19 NOTE — PLAN OF CARE
Problem: Chronic Conditions and Co-morbidities  Goal: Patient's chronic conditions and co-morbidity symptoms are monitored and maintained or improved  7/19/2024 1205 by Argentina Townsend RN  Outcome: Progressing     Problem: Safety - Adult  Goal: Free from fall injury  7/19/2024 1205 by Argentina Townsend RN  Outcome: Progressing     Problem: ABCDS Injury Assessment  Goal: Absence of physical injury  7/19/2024 1205 by Argentina Townsend RN  Outcome: Progressing     Problem: Pain  Goal: Verbalizes/displays adequate comfort level or baseline comfort level  7/19/2024 1205 by Argentina Townsend RN  Outcome: Progressing     Problem: Metabolic/Fluid and Electrolytes - Adult  Goal: Electrolytes maintained within normal limits  7/19/2024 1205 by Argentina Townsend RN  Outcome: Progressing     Problem: Metabolic/Fluid and Electrolytes - Adult  Goal: Hemodynamic stability and optimal renal function maintained  7/19/2024 1205 by Argentina Townsend RN  Outcome: Progressing     Problem: Metabolic/Fluid and Electrolytes - Adult  Goal: Glucose maintained within prescribed range  7/19/2024 1205 by Argentina Townsend RN  Outcome: Progressing     Problem: Hematologic - Adult  Goal: Maintains hematologic stability  7/19/2024 1205 by Argentina Townsend RN  Outcome: Progressing     Problem: Neurosensory - Adult  Goal: Achieves stable or improved neurological status  7/19/2024 1205 by Argentina Townsend RN  Outcome: Progressing     Problem: Cardiovascular - Adult  Goal: Maintains optimal cardiac output and hemodynamic stability  7/19/2024 1205 by Argentina Townsend RN  Outcome: Progressing     Problem: Cardiovascular - Adult  Goal: Absence of cardiac dysrhythmias or at baseline  7/19/2024 1205 by Argentina Townsend RN  Outcome: Progressing     Problem: Respiratory - Adult  Goal: Achieves optimal ventilation and oxygenation  7/19/2024 1205 by Argentina Townsend RN  Outcome: Progressing     Problem: Gastrointestinal - Adult  Goal: Minimal  appropriate resources  7/19/2024 1205 by Argentina Townsend, RN  Outcome: Progressing

## 2024-07-19 NOTE — CARE COORDINATION
Transition of care update: Discharge order on chart. POD#10 CABG x 3. Plan is [facility]) and pre cert was obtained. Pt is setup for 2pm [place]  per Denise with [facility].  PASRR and ambulette form completed on 07/16. Transport envelope is with pt's soft chart. [facility] is aware to follow post cardiac surgery rehab protocol. Pt's nurse was updated. Met with pt in room and informed her. Pt was calling her family to notify them of  time to [facility].

## 2024-07-19 NOTE — PLAN OF CARE
Problem: Chronic Conditions and Co-morbidities  Goal: Patient's chronic conditions and co-morbidity symptoms are monitored and maintained or improved  7/19/2024 1247 by Argentina Townsend RN  Outcome: Adequate for Discharge     Problem: Safety - Adult  Goal: Free from fall injury  7/19/2024 1247 by Argentina Townsend RN  Outcome: Adequate for Discharge     Problem: ABCDS Injury Assessment  Goal: Absence of physical injury  7/19/2024 1247 by Argentina Townsend RN  Outcome: Adequate for Discharge     Problem: Pain  Goal: Verbalizes/displays adequate comfort level or baseline comfort level  7/19/2024 1247 by Argentina Townsend RN  Outcome: Adequate for Discharge     Problem: Metabolic/Fluid and Electrolytes - Adult  Goal: Electrolytes maintained within normal limits  7/19/2024 1247 by Argentina Townsend RN  Outcome: Adequate for Discharge     Problem: Metabolic/Fluid and Electrolytes - Adult  Goal: Hemodynamic stability and optimal renal function maintained  7/19/2024 1247 by Argentina Townsend RN  Outcome: Adequate for Discharge     Problem: Metabolic/Fluid and Electrolytes - Adult  Goal: Glucose maintained within prescribed range  7/19/2024 1247 by Argentina Townsend RN  Outcome: Adequate for Discharge     Problem: Hematologic - Adult  Goal: Maintains hematologic stability  7/19/2024 1247 by Argentina Townsend RN  Outcome: Adequate for Discharge     Problem: Neurosensory - Adult  Goal: Achieves stable or improved neurological status  7/19/2024 1247 by Argentina Townsend RN  Outcome: Adequate for Discharge     Problem: Cardiovascular - Adult  Goal: Maintains optimal cardiac output and hemodynamic stability  7/19/2024 1247 by Argentina Townsend RN  Outcome: Adequate for Discharge     Problem: Cardiovascular - Adult  Goal: Absence of cardiac dysrhythmias or at baseline  7/19/2024 1247 by Argentina Townsend RN  Outcome: Adequate for Discharge     Problem: Respiratory - Adult  Goal: Achieves optimal ventilation and

## 2024-07-19 NOTE — PROGRESS NOTES
Nurse to nurse report given to [facility]. All questions answered to satisfaction.

## 2024-07-19 NOTE — PROGRESS NOTES
On morning rounds, discussed with patient regarding discharge to SNF today as she is medically stable and slowly progressing. During this discussion, I re-iterated on multiple occasions that if she does not feel ready for discharge that we could re-evaluate. She was preparing to work with PT at that time and appeared comfortable and satisfied with the conversation.   This afternoon, my team was contacted by nursing staff regarding patient's sons concerns regarding discharge. Family was immediately contacted.   Per the son, he is concerned about patients discharge. He cited concerns regarding her atrial fibrillation which occurred on 7/14 and resolved with amiodarone bolus, as well as her feeling short of breath. I gave reassurance and explained that her saturations have been >90% at all times, including with ambulation and when she has the symptoms at night. She does desaturate to 87% while on room air with ambulation. She is on 1 L NC with saturations 98% at 11AM. Of note, on last check her incentive spirometer effort was at 750 cc. I have reiterated on every visit the importance of using incentive for pulmonary toilet. He also voiced concern over her volume status to which I explained that we are actively diuresing her and will continue to do so at her SNF. He then voiced concern over the discharge to nursing home instead of home as well as the nursing ratio at the nursing home. I explained that she is only walking  ft depending on the day, which is insufficient to discharge home safely and that I was unaware of the nursing ratios at her selected SNF. Finally, he mentioned a story in which he overhead nursing staff discussing only giving half dose of a medication to a patient, which he believed to be his mom. He then requested \"all PHI and MAR\" information to which I recommended that he do so but to discuss with the charge nurse as I do not handle dispensing medical records.  In the end, he voiced his

## 2024-07-19 NOTE — PLAN OF CARE
Problem: Chronic Conditions and Co-morbidities  Goal: Patient's chronic conditions and co-morbidity symptoms are monitored and maintained or improved  Outcome: Progressing  Flowsheets (Taken 7/18/2024 2005)  Care Plan - Patient's Chronic Conditions and Co-Morbidity Symptoms are Monitored and Maintained or Improved: Monitor and assess patient's chronic conditions and comorbid symptoms for stability, deterioration, or improvement     Problem: Safety - Adult  Goal: Free from fall injury  Outcome: Progressing     Problem: ABCDS Injury Assessment  Goal: Absence of physical injury  Outcome: Progressing     Problem: Pain  Goal: Verbalizes/displays adequate comfort level or baseline comfort level  Outcome: Progressing     Problem: Discharge Planning  Goal: Discharge to home or other facility with appropriate resources  Outcome: Progressing  Flowsheets (Taken 7/18/2024 2005)  Discharge to home or other facility with appropriate resources: Identify barriers to discharge with patient and caregiver     Problem: Metabolic/Fluid and Electrolytes - Adult  Goal: Electrolytes maintained within normal limits  Outcome: Progressing  Flowsheets (Taken 7/18/2024 2005)  Electrolytes maintained within normal limits: Monitor labs and assess patient for signs and symptoms of electrolyte imbalances  Goal: Hemodynamic stability and optimal renal function maintained  Outcome: Progressing  Flowsheets (Taken 7/18/2024 2005)  Hemodynamic stability and optimal renal function maintained: Monitor labs and assess for signs and symptoms of volume excess or deficit  Goal: Glucose maintained within prescribed range  Outcome: Progressing  Flowsheets (Taken 7/18/2024 2005)  Glucose maintained within prescribed range: Monitor blood glucose as ordered     Problem: Hematologic - Adult  Goal: Maintains hematologic stability  Outcome: Progressing  Flowsheets (Taken 7/18/2024 2005)  Maintains hematologic stability: Assess for signs and symptoms of bleeding or

## 2024-07-19 NOTE — PROGRESS NOTES
Subjective:    Chief complaint:    Seen earlier this morning  Continues to complain of shortness of breath    Objective:    /78   Pulse 71   Temp 97 °F (36.1 °C) (Temporal)   Resp 18   Ht 1.651 m (5' 5\")   Wt 93.8 kg (206 lb 12.8 oz)   SpO2 98%   BMI 34.41 kg/m²   General : Awake ,alert,no distress.  Heart:  RRR, no murmurs, gallops, or rubs.  Lungs:  CTA bilaterally, no wheeze, rales or rhonchi  Abd: bowel sounds present, nontender, nondistended, no masses  Extrem:  No clubbing, cyanosis, or edema    CBC:   Lab Results   Component Value Date/Time    WBC 10.4 07/19/2024 06:24 AM    RBC 2.38 07/19/2024 06:24 AM    HGB 9.9 07/19/2024 06:24 AM    HCT 31.5 07/19/2024 06:24 AM    .4 07/19/2024 06:24 AM    MCH 41.6 07/19/2024 06:24 AM    MCHC 31.4 07/19/2024 06:24 AM    RDW 13.2 07/19/2024 06:24 AM     07/19/2024 06:24 AM    MPV 10.7 07/19/2024 06:24 AM     BMP:    Lab Results   Component Value Date/Time     07/19/2024 06:24 AM    K 4.4 07/19/2024 06:24 AM     07/19/2024 06:24 AM    CO2 23 07/19/2024 06:24 AM    BUN 24 07/19/2024 06:24 AM    CREATININE 0.9 07/19/2024 06:24 AM    CALCIUM 8.5 07/19/2024 06:24 AM    LABGLOM 67 07/19/2024 06:24 AM    LABGLOM >60 12/18/2023 02:00 PM    GLUCOSE 103 07/19/2024 06:24 AM    GLUCOSE 104 11/23/2010 09:48 AM     PT/INR:    Lab Results   Component Value Date/Time    PROTIME 13.0 07/10/2024 03:51 AM    INR 1.2 07/10/2024 03:51 AM     Troponin:  No results found for: \"TROPONINI\"    No results for input(s): \"LABURIN\" in the last 72 hours.  No results for input(s): \"BC\" in the last 72 hours.  No results for input(s): \"BLOODCULT2\" in the last 72 hours.      Current Facility-Administered Medications:     hydroxyurea (HYDREA) chemo capsule 500 mg, 500 mg, Oral, BID, Suad Bynum APRN - CNP    tiZANidine (ZANAFLEX) tablet 4 mg, 4 mg, Oral, Q6H PRN, Art Blue MD    metoprolol tartrate (LOPRESSOR) tablet 50 mg, 50 mg, Oral, BID, Breit,  Sandra [place] PA-C, 50 mg at 07/19/24 1105    metFORMIN (GLUCOPHAGE) tablet 500 mg, 500 mg, Oral, BID WC, Sandra Valenzuela PA-C, 500 mg at 07/19/24 0844    insulin glargine (LANTUS) injection vial 24 Units, 24 Units, SubCUTAneous, Nightly, Sandra Valenzuela PA-C, 24 Units at 07/18/24 2101    ipratropium 0.5 mg-albuterol 2.5 mg (DUONEB) nebulizer solution 1 Dose, 1 Dose, Inhalation, Q4H PRN, Art Blue MD    enoxaparin (LOVENOX) injection 40 mg, 40 mg, SubCUTAneous, Daily, Mar Salas APRN - CNP, 40 mg at 07/19/24 1103    insulin lispro (HUMALOG,ADMELOG) injection vial 0-16 Units, 0-16 Units, SubCUTAneous, TID LATANYA, Linnea Garcia APRN - CNP, 4 Units at 07/18/24 1720    insulin lispro (HUMALOG,ADMELOG) injection vial 0-4 Units, 0-4 Units, SubCUTAneous, Nightly, Linnea Garcia APRN - CNP, 4 Units at 07/13/24 1957    acetaminophen (TYLENOL) tablet 650 mg, 650 mg, Oral, Q4H PRN, Linnea Garcia APRN - CNP    bisacodyl (DULCOLAX) EC tablet 5 mg, 5 mg, Oral, Daily, Linnea Garcia APRN - CNP, 5 mg at 07/19/24 1103    sennosides-docusate sodium (SENOKOT-S) 8.6-50 MG tablet 1 tablet, 1 tablet, Oral, BID, Linnea Garcia APRN - CNP, 1 tablet at 07/19/24 1104    magnesium hydroxide (MILK OF MAGNESIA) 400 MG/5ML suspension 30 mL, 30 mL, Oral, Daily, Linnea Garcia APRN - CNP, 30 mL at 07/14/24 0831    amiodarone (CORDARONE) tablet 400 mg, 400 mg, Oral, PRN, Linnea Garcia APRN - CNP    diphenhydrAMINE (BENADRYL) tablet 25 mg, 25 mg, Oral, Q8H PRN, Linnea Garcia APRN - CNP, 25 mg at 07/12/24 2105    folic acid (FOLVITE) tablet 1 mg, 1 mg, Oral, Daily, Linnea Garcia APRN - CNP, 1 mg at 07/19/24 1103    ferrous sulfate (IRON 325) tablet 325 mg, 325 mg, Oral, BID WC, Linnea Garcia APRN - CNP, 325 mg at 07/19/24 0844    HYDROmorphone (DILAUDID) injection 0.25 mg, 0.25 mg, IntraVENous, Q4H PRN, Linnea Garcia APRN - CNP    glucose chewable tablet 16 g, 4 tablet, Oral, PRN, Linnea Garcia

## 2024-07-20 NOTE — PROGRESS NOTES
Physician Progress Note      PATIENT:               ANDREY HYDE  [facility] #:                  [id number]  :                       1960  ADMIT DATE:       2024 7:56 PM  DISCH DATE:        2024 6:49 PM  RESPONDING  PROVIDER #:        JUAN JOSE LIRIANO          QUERY TEXT:    Dear Provider,    Pt underwent a CABG x 3 for MV CAD on .  Noted  CTS documentation of   \"Acute Pulmonary Insufficiency Following Surgery - Expected 2/2 surgery,   obesity\" and cardiology consult 7/10 documentation of \"Acute hypoxic   respiratory failure post-op on NC O2\". If possible, please document in   progress notes and discharge summary if you are evaluating and /or treating   any of the following:    The medical record reflects the following:  Risk Factors: CABG x 3 for MV CAD on , obesity  Clinical Indicators: Noted  CTS documentation of \"Acute Pulmonary   Insufficiency Following Surgery - Expected  2/2 surgery, obesity\" and cardiology consult 7/10 documentation of \"Acute   hypoxic respiratory failure post-op on  NC O2\".  Per exams: Pulmonary: Diminished bibasilar.  No wheezes, no accessory muscle   use noted.  RR 17-22  Treatment:  Intubated for surgery and extubated per protocol on the day of   surgery and placed on 6L/NC initially with gradual  decrease to 1 to 2L/NC over several days, wean oxygen to keep SpO2 greater   than or equal to 92% ABG's, duonebs with ezpap,  encourage C&DB, SMI, pep/flutter    Thank you,  Kath Buenrostro RN  Clinical Documentation Integrity  [phone]  Options provided:  -- Expected acute pulmonary insufficiency following surgery confirmed and   post-operative respiratory failure ruled out.  -- Other - I will add my own diagnosis  -- Disagree - Not applicable / Not valid  -- Disagree - Clinically unable to determine / Unknown  -- Refer to Clinical Documentation Reviewer    PROVIDER RESPONSE TEXT:    After study, Expected acute pulmonary insufficiency following surgery   confirmed and  "Chief Complaint  Follow-up of the Right Knee     Subjective      Shwetha Lane presents to [facility] for follow up of the right knee. She has had osteo arthritis for years and has treated her knee conservatively.  She has difficulty with long distance ambulation and standing.  She is here today for a right knee Synvisc injection. She wants to schedule a right total knee arthroplasty in September.       Allergies   Allergen Reactions   • Bee Venom Swelling   • Letrozole Swelling   • Nickel Rash        Social History     Socioeconomic History   • Marital status:    Tobacco Use   • Smoking status: Former     Types: Cigarettes     Quit date:      Years since quittin.3   • Smokeless tobacco: Never   Vaping Use   • Vaping Use: Never used   Substance and Sexual Activity   • Alcohol use: No   • Drug use: No   • Sexual activity: Defer        Review of Systems     Objective   Vital Signs:   Ht 165.1 cm (65\")   Wt 112 kg (247 lb)   BMI 41.10 kg/m²       Physical Exam  Constitutional:       Appearance: Normal appearance. Patient is well-developed and normal weight.   HENT:      Head: Normocephalic.      Right Ear: Hearing and external ear normal.      Left Ear: Hearing and external ear normal.      Nose: Nose normal.   Eyes:      Conjunctiva/sclera: Conjunctivae normal.   Cardiovascular:      Rate and Rhythm: Normal rate.   Pulmonary:      Effort: Pulmonary effort is normal.      Breath sounds: No wheezing or rales.   Abdominal:      Palpations: Abdomen is soft.      Tenderness: There is no abdominal tenderness.   Musculoskeletal:      Cervical back: Normal range of motion.   Skin:     Findings: No rash.   Neurological:      Mental Status: Patient  is alert and oriented to person, place, and time.   Psychiatric:         Mood and Affect: Mood and affect normal.         Judgment: Judgment normal.       Ortho Exam      RIGHT KNEE: Good strength to hamstrings, quadriceps, " dorsiflexors and plantar flexors. -2 degrees of extension. Flexion to 120 degrees. Calf supple, non-tender, no signs of DVT. Dorsal Pedal Pulse 2+, posterior tibialis pulse 2+. Tender joint lines. Crepitus with motion. Skin intact.       Large Joint Arthrocentesis: R knee  Date/Time: 4/26/2023 1:39 PM  Consent given by: patient  Site marked: site marked  Timeout: Immediately prior to procedure a time out was called to verify the correct patient, procedure, equipment, support staff and site/side marked as required   Supporting Documentation  Indications: pain   Procedure Details  Location: knee - R knee  Preparation: Patient was prepped and draped in the usual sterile fashion  Needle size: 22 G  Medications administered: 48 mg hylan 48 MG/6ML  Patient tolerance: patient tolerated the procedure well with no immediate complications              Imaging Results (Most Recent)     Procedure Component Value Units Date/Time    XR Knee 3 View Right [[id number]] Resulted: 04/26/23 1305     Updated: 04/26/23 1305           Result Review :     X-Ray Report:  Right knee X-Ray  Indication: Evaluation of the right knee  AP/Lateral and Blossom view(s)  Findings: Advanced degenerative arthritis.    Prior studies available for comparison: yes             Assessment and Plan     Diagnoses and all orders for this visit:    1. Primary osteoarthritis of right knee (Primary)  -     XR Knee 3 View Right        Discussed the treatment plan with the patient. I reviewed the X-rays that were obtained today with the patient. Discussed the treatment options with the patient, operative vs non-operative. The patient expressed understanding and wished to proceed with a right knee Synvisc injection to get her through until she can schedule a total knee arthroplasty in September.  She tolerated the injection well.     The patient expressed understanding and wished to proceed with a right total knee arthroplasty with heidi rafael.      Educated on risk  of elevated BMI.  Discussed options for weight loss/decreasing BMI prior to procedure including dietician consult, weight loss options and exercise program., Discussed surgery., Risks/benefits discussed with patient including, but not limited to: infection, bleeding, neurovascular damage, re-rupture, aesthetic deformity, need for further surgery, and death., Discussed with patient the implant type being used during surgery and patient understands., Surgery pamphlet given., Call or return if worsening symptoms. and DME order for a 3 in 1 given today due to patient will be confined to one room/level of the home that does not offer a toilet during postop recovery.     Follow Up     2 weeks postoperatively       Patient was given instructions and counseling regarding her condition or for health maintenance advice. Please see specific information pulled into the AVS if appropriate.     Scribed for Denys Mohr MD by Chelsie Rendon MA.  04/26/23   13:08 EDT      I have personally performed the services described in this document as scribed by the above individual and it is both accurate and complete. Denys Mohr MD 04/26/23

## 2024-07-24 NOTE — DISCHARGE SUMMARY
Physician Discharge Summary     Patient ID:  Sherie Guerrero  [id number]  63 y.o.  1960    Admit date: 7/1/2024    Discharge date and time: 7/19/2024  6:49 PM     Admitting Physician: Martine Gleason DO     Discharge Physician: Martine Gleason DO     Admission Diagnoses: Chest pain [R07.9]  ACS (acute coronary syndrome) (HCC) [I24.9]    Discharge Diagnoses: Severe multivessel coronary artery disease, DM, essential thrombocythemia, CHADSVASC 4.     Admission Condition: good    Discharged Condition: good    Indication for Admission: This is a 63-year-old female who was having chest pain.  She had a cardiac cath, which showed severe multivessel coronary artery disease.  She was referred for coronary artery bypass grafting by Dr. Eldridge.  The patient was described the procedure in full including risks and complications, including but not limited to bleeding, infection, need for reoperation, stroke, myocardial infarction, and death; and the patient agreed to proceed.     PROCEDURES PERFORMED:  1.  Urgent sternotomy.  2.  Urgent coronary artery bypass grafting x 3; left internal mammary artery to the LAD, saphenous vein graft to the diagonal branch, saphenous vein graft to the distal right coronary artery.  3.  Left atrial appendage occlusion with a 35 mm AtriClip.  4.  Endoscopic harvesting, left lower extremity greater saphenous vein.  5.  Sternal closure with combination of sternal wires and KLS sternal plates    Hospital Course: Course as above. The patient underwent  urgent sternotomy, urgent CABG x3 (LIMA-LAD, SVG-RCA, SVG-diag), EVH, left atrial appendage ligation with 35mm Atriclip on 7/9/2024.  Postoperatively, she was transferred to the CVICU in guarded stable condition and extubated once indications met. Once appropriate, she was transferred to the monitored stepdown unit and beta blockade was initiated. The mediastinal/pleural chest tubes, and epicardial pacing wires were discontinued in the usual

## 2024-08-01 PROBLEM — R79.89 ELEVATED TROPONIN: Status: RESOLVED | Noted: 2024-07-02 | Resolved: 2024-08-01

## 2024-08-02 NOTE — PROGRESS NOTES
Cardiothoracic Surgery       CC: S/P CABG post-operative follow up visit      HPI:  Sherie Guerrero is a 63 y.o. female whom presents to the office today for routine post-operative follow-up status post: urgent sternotomy, urgent CABG x3 (LIMA-LAD, SVG-RCA, SVG-diag), EVH, left atrial appendage ligation with 35mm Atriclip on 7/9/2024.  There have been no readmissions since discharge.  Currently, there are no complaints of any CP, SOB, major concerns, or incisional issues.     Review of Systems:   Constitutional: Negative for fever and chills.   Respiratory: Negative for cough, chest tightness and shortness of breath.    Cardiovascular: Negative for chest pain, palpitations and leg swelling.   Gastrointestinal: Negative for nausea, diarrhea and constipation.   Musculoskeletal: Negative for back pain.   Skin: Negative for color change.   Neurological: Negative for dizziness, syncope and light-headedness.          Objective:   Physical Exam   Constitutional: oriented to person, place, and time. No distress.   Cardiovascular: Normal rate.    Pulmonary/Chest: Effort normal. No respiratory distress.   midsternal and chest tube incisions well healed without evidence of infection. The inferior portion of the sternum is slightly opened with fibrinous debris and minimal erythema. It is moist. Sternum stable.   Abdominal: Soft. Bowel sounds are normal.   Musculoskeletal: Normal range of motion. Exhibits no edema.   Neurological: alert and oriented to person, place, and time.   Skin: Skin is warm and dry. No erythema.   Vein harvest incisions intact without evidence of infection   Psychiatric: normal mood and affect.       Assessment:      S/P urgent sternotomy, urgent CABG x3 (LIMA-LAD, SVG-RCA, SVG-diag), EVH, left atrial appendage ligation with 35mm Atriclip on 7/9/2024.        Plan:      Remove sternal precautions on 8/20/24  Cardiac

## 2024-08-08 ENCOUNTER — OFFICE VISIT (OUTPATIENT)
Dept: CARDIOTHORACIC SURGERY | Age: 64
End: 2024-08-08

## 2024-08-08 VITALS
WEIGHT: 193.6 LBS | SYSTOLIC BLOOD PRESSURE: 145 MMHG | HEART RATE: 64 BPM | BODY MASS INDEX: 32.26 KG/M2 | DIASTOLIC BLOOD PRESSURE: 74 MMHG | HEIGHT: 65 IN

## 2024-08-08 DIAGNOSIS — Z95.1 S/P CABG (CORONARY ARTERY BYPASS GRAFT): Primary | ICD-10-CM

## 2024-08-08 RX ORDER — DOXYCYCLINE HYCLATE 100 MG
100 TABLET ORAL 2 TIMES DAILY
Qty: 14 TABLET | Refills: 0 | Status: SHIPPED | OUTPATIENT
Start: 2024-08-08 | End: 2024-08-15

## 2024-08-12 ENCOUNTER — TELEPHONE (OUTPATIENT)
Dept: VASCULAR SURGERY | Age: 64
End: 2024-08-12

## 2024-08-12 NOTE — TELEPHONE ENCOUNTER
[facility] called stating that mireya is having tingling and nausea from taking Doxycline.  States  Cipro is the best antibiotic for her.  She saw Rosibel Griffiths on Thrusday Please Advise thank you Sonia

## 2024-08-13 RX ORDER — CIPROFLOXACIN 500 MG/1
500 TABLET, FILM COATED ORAL 2 TIMES DAILY
Qty: 14 TABLET | Refills: 0 | Status: SHIPPED | OUTPATIENT
Start: 2024-08-13 | End: 2024-08-20

## 2024-08-13 NOTE — TELEPHONE ENCOUNTER
[facility]

## 2024-08-26 ENCOUNTER — TELEPHONE (OUTPATIENT)
Dept: CARDIOLOGY CLINIC | Age: 64
End: 2024-08-26

## 2024-08-26 NOTE — TELEPHONE ENCOUNTER
Pt sister/Zoey gayle to schedule hosp fup appt with Dr Carter (states she left vm to have appt scheduled-no call back).  Please call Zoey to schedule pt due to pt care [phone]

## 2024-08-29 ENCOUNTER — TELEPHONE (OUTPATIENT)
Dept: CARDIOLOGY CLINIC | Age: 64
End: 2024-08-29

## 2024-08-29 NOTE — TELEPHONE ENCOUNTER
Called pt to schedule appt w/ Dr. Carter. Pt requested her sister to schedule the appt for her. Called sister Zoey KIRKPATRICK. No answer.

## 2024-08-30 PROBLEM — I25.10 CAD IN NATIVE ARTERY: Status: RESOLVED | Noted: 2024-07-01 | Resolved: 2024-08-30

## 2024-09-09 ENCOUNTER — OFFICE VISIT (OUTPATIENT)
Dept: CARDIOLOGY CLINIC | Age: 64
End: 2024-09-09
Payer: COMMERCIAL

## 2024-09-09 VITALS
DIASTOLIC BLOOD PRESSURE: 60 MMHG | BODY MASS INDEX: 33.32 KG/M2 | RESPIRATION RATE: 18 BRPM | SYSTOLIC BLOOD PRESSURE: 138 MMHG | WEIGHT: 200 LBS | HEART RATE: 61 BPM | HEIGHT: 65 IN

## 2024-09-09 DIAGNOSIS — D75.839 THROMBOCYTOSIS: ICD-10-CM

## 2024-09-09 DIAGNOSIS — E78.00 PURE HYPERCHOLESTEROLEMIA: ICD-10-CM

## 2024-09-09 DIAGNOSIS — I25.10 CORONARY ARTERY DISEASE INVOLVING NATIVE CORONARY ARTERY OF NATIVE HEART WITHOUT ANGINA PECTORIS: Primary | ICD-10-CM

## 2024-09-09 DIAGNOSIS — E11.9 TYPE 2 DIABETES MELLITUS WITHOUT COMPLICATION, WITHOUT LONG-TERM CURRENT USE OF INSULIN (HCC): ICD-10-CM

## 2024-09-09 DIAGNOSIS — E66.8 MODERATE OBESITY: ICD-10-CM

## 2024-09-09 PROCEDURE — 3075F SYST BP GE 130 - 139MM HG: CPT | Performed by: INTERNAL MEDICINE

## 2024-09-09 PROCEDURE — G8400 PT W/DXA NO RESULTS DOC: HCPCS | Performed by: INTERNAL MEDICINE

## 2024-09-09 PROCEDURE — 3044F HG A1C LEVEL LT 7.0%: CPT | Performed by: INTERNAL MEDICINE

## 2024-09-09 PROCEDURE — 1123F ACP DISCUSS/DSCN MKR DOCD: CPT | Performed by: INTERNAL MEDICINE

## 2024-09-09 PROCEDURE — G8417 CALC BMI ABV UP PARAM F/U: HCPCS | Performed by: INTERNAL MEDICINE

## 2024-09-09 PROCEDURE — 1090F PRES/ABSN URINE INCON ASSESS: CPT | Performed by: INTERNAL MEDICINE

## 2024-09-09 PROCEDURE — G8427 DOCREV CUR MEDS BY ELIG CLIN: HCPCS | Performed by: INTERNAL MEDICINE

## 2024-09-09 PROCEDURE — 1036F TOBACCO NON-USER: CPT | Performed by: INTERNAL MEDICINE

## 2024-09-09 PROCEDURE — 93000 ELECTROCARDIOGRAM COMPLETE: CPT | Performed by: INTERNAL MEDICINE

## 2024-09-09 PROCEDURE — 3017F COLORECTAL CA SCREEN DOC REV: CPT | Performed by: INTERNAL MEDICINE

## 2024-09-09 PROCEDURE — 3078F DIAST BP <80 MM HG: CPT | Performed by: INTERNAL MEDICINE

## 2024-09-09 PROCEDURE — 2022F DILAT RTA XM EVC RTNOPTHY: CPT | Performed by: INTERNAL MEDICINE

## 2024-09-09 PROCEDURE — 99215 OFFICE O/P EST HI 40 MIN: CPT | Performed by: INTERNAL MEDICINE

## 2024-09-09 RX ORDER — HYDROXYUREA 500 MG/1
CAPSULE ORAL 2 TIMES DAILY
COMMUNITY

## 2024-09-09 RX ORDER — AMLODIPINE BESYLATE 5 MG/1
5 TABLET ORAL 2 TIMES DAILY
COMMUNITY

## 2024-10-02 ENCOUNTER — HOSPITAL ENCOUNTER (OUTPATIENT)
Dept: CARDIAC REHAB | Age: 64
Setting detail: THERAPIES SERIES
Discharge: HOME | End: 2024-10-02
Payer: COMMERCIAL

## 2024-10-02 VITALS — BODY MASS INDEX: 33.49 KG/M2 | OXYGEN SATURATION: 99 % | RESPIRATION RATE: 20 BRPM | HEIGHT: 65 IN | WEIGHT: 201 LBS

## 2024-10-02 PROCEDURE — 93798 PHYS/QHP OP CAR RHAB W/ECG: CPT

## 2024-10-02 PROCEDURE — 93797 PHYS/QHP OP CAR RHAB WO ECG: CPT

## 2024-10-02 ASSESSMENT — PATIENT HEALTH QUESTIONNAIRE - PHQ9
4. FEELING TIRED OR HAVING LITTLE ENERGY: NOT AT ALL
3. TROUBLE FALLING OR STAYING ASLEEP: NOT AT ALL
6. FEELING BAD ABOUT YOURSELF - OR THAT YOU ARE A FAILURE OR HAVE LET YOURSELF OR YOUR FAMILY DOWN: NOT AT ALL
SUM OF ALL RESPONSES TO PHQ QUESTIONS 1-9: 4
2. FEELING DOWN, DEPRESSED OR HOPELESS: SEVERAL DAYS
SUM OF ALL RESPONSES TO PHQ QUESTIONS 1-9: 4
5. POOR APPETITE OR OVEREATING: SEVERAL DAYS
8. MOVING OR SPEAKING SO SLOWLY THAT OTHER PEOPLE COULD HAVE NOTICED. OR THE OPPOSITE, BEING SO FIGETY OR RESTLESS THAT YOU HAVE BEEN MOVING AROUND A LOT MORE THAN USUAL: NOT AT ALL
9. THOUGHTS THAT YOU WOULD BE BETTER OFF DEAD, OR OF HURTING YOURSELF: NOT AT ALL
SUM OF ALL RESPONSES TO PHQ9 QUESTIONS 1 & 2: 2
SUM OF ALL RESPONSES TO PHQ QUESTIONS 1-9: 4
SUM OF ALL RESPONSES TO PHQ QUESTIONS 1-9: 4
1. LITTLE INTEREST OR PLEASURE IN DOING THINGS: SEVERAL DAYS
7. TROUBLE CONCENTRATING ON THINGS, SUCH AS READING THE NEWSPAPER OR WATCHING TELEVISION: SEVERAL DAYS

## 2024-10-02 NOTE — CARDIO/PULMONARY
PT. SEEN IN  CARDIAC REHAB TODAY FOR INITIAL EVALUATION AND EXERCISE. PT. S/P CABG X 3 ON 7-9-24. STERNAL AND LEG INCISIONS ARE HEALING WELL. NO EVIDENCE OF INFECTION. PT DOES GET SOME SWELLING OF HER ANKLES AND FEET. SHE STAES IT IS DUE TO THE NORVASC MEDICATION.  EJECTION FRACTION IS 60 % FROM PANCHO ECHO ON 7-5-24.  PT. HAS A HX OF DIABETES TYPE 2, INCREASED PLATELETS AND ARTHRITIS IN HER BACK. SHE ALSO STATES SHE DOES HAVE SOME DEPRESSION AND ANXIETY WHICH IS CONTROLLED.  NO EXERCISE OR EQUIPMENT LIMITATIONS. PHQ9 SCORE IS A 4. FALLS RISK SCORE IS A 2/8 LOW.  SHE IS A FORMER SMOKER OF 20 YEARS AND QUIT 20 YEARS AGO.  SHE DOES GET DYSPNEA ON EXERTION. PT.S GOALS ARE TO IMPROVE HER STRENGTH , STAMINA, AND ENDURANCE WHILE EXERCISING AT CARDIAC REHAB. SHE WOULD ALSO LIKE TO BE KNOWLEDGEABLE ABOUT A DIABETES DIET AND OVERALL BE MORE HEALTHY. SHE WOULD LIKE TO HAVE A BETTER UNDERSTANDING OF DIET, MEDICATION AND EXERCISE ON CARDIAC HEALTH WHILE ATTENDING CARDIAC REHAB. SHE IS [place]. SHE IS WEARS A HEARING AIDE IN HER LEFT EAR AND IS COMPLETELY DEAF IN HER RIGHT EAR.

## 2024-10-02 NOTE — PROGRESS NOTES
10/02/24 1211   Treatment Diagnosis   Treatment Diagnosis 1 CABG  (CABG X3)   CABG Date 07/09/24   Referral Date 08/27/24   Significant Cardiovascular History Previous CABG   Co-morbidities Diabetes   Individual Treatment Plan   ITP Visit Type Initial assessment   ITP Next Review Date   (unlimited)   Visit #/Total Visits 2/36   EF% 60 %   Risk Stratification Low   Supplemental Oxygen   Oxygen Use No   Exercise Assessment   Stages of Change Action   Assisted Devices None   Test Six minute walk test   Data Measured Before Test   Heart Rate 60   Resting Blood Pressure 144/74   O2 Saturation 99   O2 Device Room air   RPD 0   RPE 7   Data Measured During Test   Indicate Mode of RPE 6 minutes   Modality Treadmill   Treadmill Speed 1.2 mph   Treadmill Grade 0 %   METS 1.92   Symptoms   (none)   Problems While Exercising none   Describe Type of Pain You Are Having none   Blood Pressure 134/70   SpO2 98 %   RPE 10   RPD 0   Data Measured at Peak   Distance Calculated in  mi   Distance (miles) 0.11   Distance (Feet-Calculated) 580.8 ft   Calories 13   Peak Heart Rate 79   Peak Blood Pressure 144/74   Peak RPE 10   SpO2 98   Data Measured at 5 Minutes After Test   Heart Rate 65   Blood Pressure 114/70   SpO2 100 %   O2 Device Room air   Comments Patient completed 6mwt witih no complications. She stated she was nervous to walk faster because she did not want to become SOB. Patient walked at a 1.2 speed reaching a MET level of 1.9   Exercise Intervention/Prescription   Mode Treadmill;Bike;Ergometer;Stepper  (Stepper-NuStep)   Frequency per week 3 x's per wk   Duration Per Session 30-60  (work up to)   Intensity METS       3-4  (Working up to, on first day 10/2/24 patient was at 1.9-4.1)   RPE 10   Progression Have patient progress to 60 min total by 36/36 session. Would like patient to be able to walk 8-10 min on the treadmill by session 10/36. With a speed of 1.5mph   Symptoms with Exercise Shortness of breath   [facility]

## 2024-10-04 ENCOUNTER — HOSPITAL ENCOUNTER (OUTPATIENT)
Dept: CARDIAC REHAB | Age: 64
Setting detail: THERAPIES SERIES
Discharge: HOME | End: 2024-10-04
Attending: INTERNAL MEDICINE
Payer: COMMERCIAL

## 2024-10-04 PROCEDURE — 93798 PHYS/QHP OP CAR RHAB W/ECG: CPT

## 2024-10-08 ENCOUNTER — HOSPITAL ENCOUNTER (OUTPATIENT)
Dept: CARDIAC REHAB | Age: 64
Setting detail: THERAPIES SERIES
Discharge: HOME | End: 2024-10-08
Attending: INTERNAL MEDICINE
Payer: COMMERCIAL

## 2024-10-08 PROCEDURE — 93798 PHYS/QHP OP CAR RHAB W/ECG: CPT

## 2024-10-10 ENCOUNTER — HOSPITAL ENCOUNTER (OUTPATIENT)
Dept: CARDIAC REHAB | Age: 64
Setting detail: THERAPIES SERIES
Discharge: HOME | End: 2024-10-10
Attending: INTERNAL MEDICINE
Payer: COMMERCIAL

## 2024-10-10 PROCEDURE — 93798 PHYS/QHP OP CAR RHAB W/ECG: CPT

## 2024-10-11 ENCOUNTER — HOSPITAL ENCOUNTER (OUTPATIENT)
Dept: CARDIAC REHAB | Age: 64
Setting detail: THERAPIES SERIES
Discharge: HOME | End: 2024-10-11
Attending: INTERNAL MEDICINE
Payer: COMMERCIAL

## 2024-10-11 PROCEDURE — 93798 PHYS/QHP OP CAR RHAB W/ECG: CPT

## 2024-10-14 ENCOUNTER — HOSPITAL ENCOUNTER (OUTPATIENT)
Dept: CARDIAC REHAB | Age: 64
Setting detail: THERAPIES SERIES
Discharge: HOME | End: 2024-10-14
Attending: INTERNAL MEDICINE
Payer: COMMERCIAL

## 2024-10-14 PROCEDURE — 93798 PHYS/QHP OP CAR RHAB W/ECG: CPT

## 2024-10-16 ENCOUNTER — HOSPITAL ENCOUNTER (OUTPATIENT)
Dept: CARDIAC REHAB | Age: 64
Setting detail: THERAPIES SERIES
Discharge: HOME | End: 2024-10-16
Attending: INTERNAL MEDICINE
Payer: COMMERCIAL

## 2024-10-16 PROCEDURE — 93798 PHYS/QHP OP CAR RHAB W/ECG: CPT

## 2024-10-18 ENCOUNTER — HOSPITAL ENCOUNTER (OUTPATIENT)
Dept: CARDIAC REHAB | Age: 64
Setting detail: THERAPIES SERIES
Discharge: HOME | End: 2024-10-18
Attending: INTERNAL MEDICINE
Payer: COMMERCIAL

## 2024-10-18 PROCEDURE — 93798 PHYS/QHP OP CAR RHAB W/ECG: CPT

## 2024-10-21 ENCOUNTER — HOSPITAL ENCOUNTER (OUTPATIENT)
Dept: CARDIAC REHAB | Age: 64
Setting detail: THERAPIES SERIES
Discharge: HOME | End: 2024-10-21
Attending: INTERNAL MEDICINE
Payer: COMMERCIAL

## 2024-10-21 PROCEDURE — 93798 PHYS/QHP OP CAR RHAB W/ECG: CPT

## 2024-10-21 PROCEDURE — 93797 PHYS/QHP OP CAR RHAB WO ECG: CPT

## 2024-10-21 NOTE — CONSULTS
Nutrition Assessment & Medical Nutrition Therapy      Date: 10/21/24   Time: 11:00 am   Patient Name: Sherie Guerrero   PCP - General: Ovidio Avery MD   Fax: [phone]   Reason for Visit: Pt is a 63 y.o. female s/p CABG X 3 on 7/9/24 and h/o T2DM, seen in cardiac rehab for initial nutrition assessment and diet counseling.     Goals:  Eat a diet rich in a variety of fruits, vegetables, whole grains, lean proteins, and healthy fats with balance and portion control for improved heart health and wt control/ wt reduction of 1-2 lbs/week to goal.  Reduce sodium and saturated fats through increased awareness of foods to limit and applying label reading guidelines for healthier options.    Limit intake of refined carbohydrates(sweet foods) to once per day, in controlled portions, to aid in BG regulation.     Current Eating Pattern:  Pt has made significant changes to eating habits since surgery.  Breakfast/Brunch is whole wheat toast, egg beaters, turkey sausage and decafe coffee or yogurt with fruit, or sandwich with low sodium turkey or ham.  Dinner is lean beef or chicken with vegetable and salad or pasta and meatballs or pizza.  Her snacks include baked apples, fruit, Sun chips with yogurt spinach dip, [employer] Frozen yogurt bars or cake/cookies/candy.  She drinks mostly water.  She does not salt foods and avoids processed and canned foods.      Allergies/Food Sensitivities:   Allergic to shellfish    Dietary History/Limitations/Time/Prep Issues:  Pt is much more aware of the types of foods she consumes but has not been educated on carbohydrate control for Diabetes.  She states she has been on many \"diets\" over the years including Weight Watchers.      Stress/Environment Issues that may be affecting po intake:  None identified    Weight Hx:  Pt states she has been gaining weight slowly over the years but has been stable more recently.  She would like to achieve a healthier wt of 175 lbs through diet and

## 2024-10-23 ENCOUNTER — HOSPITAL ENCOUNTER (OUTPATIENT)
Dept: CARDIAC REHAB | Age: 64
Setting detail: THERAPIES SERIES
Discharge: HOME | End: 2024-10-23
Attending: INTERNAL MEDICINE
Payer: COMMERCIAL

## 2024-10-23 PROCEDURE — 93798 PHYS/QHP OP CAR RHAB W/ECG: CPT

## 2024-10-25 ENCOUNTER — HOSPITAL ENCOUNTER (OUTPATIENT)
Dept: CARDIAC REHAB | Age: 64
Setting detail: THERAPIES SERIES
Discharge: HOME | End: 2024-10-25
Attending: INTERNAL MEDICINE
Payer: COMMERCIAL

## 2024-10-25 PROCEDURE — 93798 PHYS/QHP OP CAR RHAB W/ECG: CPT

## 2024-10-28 ENCOUNTER — HOSPITAL ENCOUNTER (OUTPATIENT)
Dept: CARDIAC REHAB | Age: 64
Setting detail: THERAPIES SERIES
Discharge: HOME | End: 2024-10-28
Attending: INTERNAL MEDICINE
Payer: COMMERCIAL

## 2024-10-28 PROCEDURE — 93798 PHYS/QHP OP CAR RHAB W/ECG: CPT

## 2024-10-30 ENCOUNTER — HOSPITAL ENCOUNTER (OUTPATIENT)
Dept: CARDIAC REHAB | Age: 64
Setting detail: THERAPIES SERIES
Discharge: HOME | End: 2024-10-30
Attending: INTERNAL MEDICINE
Payer: COMMERCIAL

## 2024-10-30 PROCEDURE — 93798 PHYS/QHP OP CAR RHAB W/ECG: CPT

## 2024-11-01 ENCOUNTER — HOSPITAL ENCOUNTER (OUTPATIENT)
Dept: CARDIAC REHAB | Age: 64
Setting detail: THERAPIES SERIES
Discharge: HOME | End: 2024-11-01
Attending: INTERNAL MEDICINE
Payer: COMMERCIAL

## 2024-11-01 PROCEDURE — 93798 PHYS/QHP OP CAR RHAB W/ECG: CPT

## 2024-11-04 ENCOUNTER — HOSPITAL ENCOUNTER (OUTPATIENT)
Dept: CARDIAC REHAB | Age: 64
Setting detail: THERAPIES SERIES
Discharge: HOME | End: 2024-11-04
Attending: INTERNAL MEDICINE
Payer: COMMERCIAL

## 2024-11-04 ENCOUNTER — TELEPHONE (OUTPATIENT)
Dept: ADMINISTRATIVE | Age: 64
End: 2024-11-04

## 2024-11-04 PROCEDURE — 93798 PHYS/QHP OP CAR RHAB W/ECG: CPT

## 2024-11-04 NOTE — TELEPHONE ENCOUNTER
Pt would like to know if she is to continue taking aspirin and Plavix.  She has been taking it since her triple bypass surgery 7/9.  Please contact pt at [phone].

## 2024-11-06 ENCOUNTER — HOSPITAL ENCOUNTER (OUTPATIENT)
Dept: CARDIAC REHAB | Age: 64
Setting detail: THERAPIES SERIES
Discharge: HOME | End: 2024-11-06
Attending: INTERNAL MEDICINE
Payer: COMMERCIAL

## 2024-11-06 PROCEDURE — 93798 PHYS/QHP OP CAR RHAB W/ECG: CPT

## 2024-11-08 ENCOUNTER — HOSPITAL ENCOUNTER (OUTPATIENT)
Dept: CARDIAC REHAB | Age: 64
Setting detail: THERAPIES SERIES
Discharge: HOME | End: 2024-11-08
Attending: INTERNAL MEDICINE
Payer: COMMERCIAL

## 2024-11-08 PROCEDURE — 93798 PHYS/QHP OP CAR RHAB W/ECG: CPT

## 2024-11-11 ENCOUNTER — HOSPITAL ENCOUNTER (OUTPATIENT)
Dept: CARDIAC REHAB | Age: 64
Setting detail: THERAPIES SERIES
Discharge: HOME | End: 2024-11-11
Attending: INTERNAL MEDICINE
Payer: COMMERCIAL

## 2024-11-11 PROCEDURE — 93798 PHYS/QHP OP CAR RHAB W/ECG: CPT

## 2024-11-13 ENCOUNTER — HOSPITAL ENCOUNTER (OUTPATIENT)
Dept: CARDIAC REHAB | Age: 64
Setting detail: THERAPIES SERIES
Discharge: HOME | End: 2024-11-13
Attending: INTERNAL MEDICINE
Payer: COMMERCIAL

## 2024-11-13 PROCEDURE — 93798 PHYS/QHP OP CAR RHAB W/ECG: CPT

## 2024-11-15 ENCOUNTER — HOSPITAL ENCOUNTER (OUTPATIENT)
Dept: CARDIAC REHAB | Age: 64
Setting detail: THERAPIES SERIES
Discharge: HOME | End: 2024-11-15
Attending: INTERNAL MEDICINE
Payer: COMMERCIAL

## 2024-11-15 PROCEDURE — 93798 PHYS/QHP OP CAR RHAB W/ECG: CPT

## 2024-11-18 ENCOUNTER — HOSPITAL ENCOUNTER (OUTPATIENT)
Dept: CARDIAC REHAB | Age: 64
Setting detail: THERAPIES SERIES
Discharge: HOME | End: 2024-11-18
Attending: INTERNAL MEDICINE
Payer: COMMERCIAL

## 2024-11-18 PROCEDURE — 93798 PHYS/QHP OP CAR RHAB W/ECG: CPT

## 2024-11-20 ENCOUNTER — HOSPITAL ENCOUNTER (OUTPATIENT)
Dept: CARDIAC REHAB | Age: 64
Setting detail: THERAPIES SERIES
Discharge: HOME | End: 2024-11-20
Attending: INTERNAL MEDICINE
Payer: COMMERCIAL

## 2024-11-20 PROCEDURE — 93798 PHYS/QHP OP CAR RHAB W/ECG: CPT

## 2024-11-22 ENCOUNTER — HOSPITAL ENCOUNTER (OUTPATIENT)
Dept: CARDIAC REHAB | Age: 64
Setting detail: THERAPIES SERIES
Discharge: HOME | End: 2024-11-22
Attending: INTERNAL MEDICINE
Payer: COMMERCIAL

## 2024-11-22 PROCEDURE — 93798 PHYS/QHP OP CAR RHAB W/ECG: CPT

## 2024-11-25 ENCOUNTER — HOSPITAL ENCOUNTER (OUTPATIENT)
Dept: CARDIAC REHAB | Age: 64
Setting detail: THERAPIES SERIES
Discharge: HOME | End: 2024-11-25
Attending: INTERNAL MEDICINE
Payer: COMMERCIAL

## 2024-11-25 PROCEDURE — 93798 PHYS/QHP OP CAR RHAB W/ECG: CPT

## 2024-11-27 ENCOUNTER — HOSPITAL ENCOUNTER (OUTPATIENT)
Dept: CARDIAC REHAB | Age: 64
Setting detail: THERAPIES SERIES
Discharge: HOME | End: 2024-11-27
Attending: INTERNAL MEDICINE
Payer: COMMERCIAL

## 2024-11-27 PROCEDURE — 93798 PHYS/QHP OP CAR RHAB W/ECG: CPT

## 2024-11-29 ENCOUNTER — APPOINTMENT (OUTPATIENT)
Dept: CARDIAC REHAB | Age: 64
End: 2024-11-29
Attending: INTERNAL MEDICINE
Payer: COMMERCIAL

## 2024-12-02 ENCOUNTER — HOSPITAL ENCOUNTER (OUTPATIENT)
Dept: CARDIAC REHAB | Age: 64
Setting detail: THERAPIES SERIES
Discharge: HOME | End: 2024-12-02
Attending: INTERNAL MEDICINE
Payer: COMMERCIAL

## 2024-12-02 PROCEDURE — 93798 PHYS/QHP OP CAR RHAB W/ECG: CPT

## 2024-12-04 ENCOUNTER — HOSPITAL ENCOUNTER (OUTPATIENT)
Dept: CARDIAC REHAB | Age: 64
Setting detail: THERAPIES SERIES
Discharge: HOME | End: 2024-12-04
Attending: INTERNAL MEDICINE
Payer: COMMERCIAL

## 2024-12-04 PROCEDURE — 93798 PHYS/QHP OP CAR RHAB W/ECG: CPT

## 2024-12-06 ENCOUNTER — HOSPITAL ENCOUNTER (OUTPATIENT)
Dept: CARDIAC REHAB | Age: 64
Setting detail: THERAPIES SERIES
Discharge: HOME | End: 2024-12-06
Attending: INTERNAL MEDICINE
Payer: COMMERCIAL

## 2024-12-06 PROCEDURE — 93798 PHYS/QHP OP CAR RHAB W/ECG: CPT

## 2024-12-09 ENCOUNTER — HOSPITAL ENCOUNTER (OUTPATIENT)
Dept: CARDIAC REHAB | Age: 64
Setting detail: THERAPIES SERIES
End: 2024-12-09
Attending: INTERNAL MEDICINE
Payer: COMMERCIAL

## 2024-12-11 ENCOUNTER — APPOINTMENT (OUTPATIENT)
Dept: CARDIAC REHAB | Age: 64
End: 2024-12-11
Attending: INTERNAL MEDICINE
Payer: COMMERCIAL

## 2024-12-13 ENCOUNTER — APPOINTMENT (OUTPATIENT)
Dept: CARDIAC REHAB | Age: 64
End: 2024-12-13
Attending: INTERNAL MEDICINE
Payer: COMMERCIAL

## 2024-12-16 ENCOUNTER — APPOINTMENT (OUTPATIENT)
Dept: CARDIAC REHAB | Age: 64
End: 2024-12-16
Attending: INTERNAL MEDICINE
Payer: COMMERCIAL

## 2024-12-18 ENCOUNTER — APPOINTMENT (OUTPATIENT)
Dept: CARDIAC REHAB | Age: 64
End: 2024-12-18
Attending: INTERNAL MEDICINE
Payer: COMMERCIAL

## 2024-12-20 ENCOUNTER — APPOINTMENT (OUTPATIENT)
Dept: CARDIAC REHAB | Age: 64
End: 2024-12-20
Attending: INTERNAL MEDICINE
Payer: COMMERCIAL

## 2025-02-18 ENCOUNTER — TELEPHONE (OUTPATIENT)
Dept: CARDIOLOGY CLINIC | Age: 65
End: 2025-02-18

## 2025-02-18 NOTE — TELEPHONE ENCOUNTER
Dr. Carter patient has dental appointment today wants to know if she  will need antibiotic   Post CABG 7-24    Please advise

## 2025-02-20 ENCOUNTER — HOSPITAL ENCOUNTER (OUTPATIENT)
Dept: AUDIOLOGY | Age: 65
Discharge: HOME | End: 2025-02-20

## 2025-02-20 PROCEDURE — 9990000010 HC NO CHARGE VISIT: Performed by: AUDIOLOGIST

## 2025-02-20 NOTE — PROGRESS NOTES
HEARING AID EVALUATION    Patient presents with audiometric testing for hearing aid evaluation/selection. After consultation, the patient would like to try ITE hearing aids. She tried RITE in the past and did not like how they felt.     The patient has [employer] insurance. Medical necessity was obtained from Dr. Santana and information will be submitted to insurance.     Bilateral ear impressions taken without incident. She prefers fit of her very old hearing aid (previously left ear user only)  [id number]. She states the newer ones \"echoed\" and were too difficult to put in. Of note, the canal was longer in the newer aid.     Pending approval, hearing aids will be ordered. The patient was not scheduled for a hearing aid fitting at this time.     Nydia Handley M.A., CCC/A  [place] Lic [id number]  Electronically signed by Deirdre Anderson on 2/20/2025 at 12:52 PM

## 2025-04-18 ENCOUNTER — HOSPITAL ENCOUNTER (OUTPATIENT)
Dept: AUDIOLOGY | Age: 65
Discharge: HOME | End: 2025-04-18

## 2025-04-18 PROCEDURE — 9990000010 HC NO CHARGE VISIT: Performed by: AUDIOLOGIST

## 2025-04-18 NOTE — PROGRESS NOTES
Fit with binaural  [email or web address]  hearing aids. Instructed in use and care. Gave  battery charger, warranty information and scheduled  30 day check for 5/23/25.  Made following adjustments: increased gain for left aid. She states left aid is weaker, but hearing is worse in that ear. Ran feedback manager, but at preferred gain gets some feedback. Disabled ear to ear for VC so that she can adjust left volume independently of right side. Hearing aid contract/battery warning form reviewed and signed.      Hearing aids not paired to phone allen. WILL NEED TO DISCUSS AT NEXT VISIT    Patient was satisfied and will follow up on above date, unless problems arise.     No charge visit today. Will bill at 30 day follow up per [place] Medicaid guidelines.     Nydia Handley M.A., CCC/A  [place] Lic [id number]  Electronically signed by Deirdre Anderson on 4/18/2025 at 12:37 PM

## 2025-04-22 NOTE — PATIENT CARE CONFERENCE
[facility] Quality Flow/Interdisciplinary Rounds Progress Note        Quality Flow Rounds held on July 14, 2024    Disciplines Attending:  Bedside Nurse and Nursing Unit Leadership    Sherie Guerrero was admitted on 7/1/2024  7:56 PM    Anticipated Discharge Date:       Disposition:    Joe Score:  Joe Scale Score: 19    Readmission Risk              Risk of Unplanned Readmission:  21           Discussed patient goal for the day, patient clinical progression, and barriers to discharge.  The following Goal(s) of the Day/Commitment(s) have been identified:  Labs - Report Results and ambulate in nick at least 3 times today      Flor Mcdonough RN  July 14, 2024         LVM informing pt that I was calling regarding results & to call bck the office. -RHODA

## 2025-04-25 ENCOUNTER — HOSPITAL ENCOUNTER (OUTPATIENT)
Dept: AUDIOLOGY | Age: 65
Discharge: HOME | End: 2025-04-25
Payer: COMMERCIAL

## 2025-04-25 PROCEDURE — V5260 HEARING AID, DIGIT, BIN, ITE: HCPCS | Performed by: AUDIOLOGIST

## 2025-04-25 PROCEDURE — V5160 DISPENSING FEE BINAURAL: HCPCS | Performed by: AUDIOLOGIST

## 2025-04-25 NOTE — PROGRESS NOTES
The patient came in for a hearing aid follow up with billing, per [place] Medicaid Guidelines.  Patient reported they are doing well with the hearing aids.       Changes to hearing aids today: right buffed down for comfort. Left increased gain for right/left balance and for complaint of sounding \"plugged\".     Counseled patient on: keeping appointment originally scheduled for 5/23/2025. She will call to cancel if she does not feel she needs appointment.      Patient is satisfied with the hearing aids and therefore billing to be done today per medicaid guidelines. Approval number: [facility] [id number].  Patient to return as needed.    Nydia Handley M.A., CCC/A  [place] Lic [id number]  Electronically signed by Deirdre Anderson on 4/25/2025 at 1:30 PM

## 2025-05-27 ENCOUNTER — FOLLOWUP TELEPHONE ENCOUNTER (OUTPATIENT)
Dept: AUDIOLOGY | Age: 65
End: 2025-05-27

## 2025-05-27 NOTE — TELEPHONE ENCOUNTER
Patient left voice message that she would like to reschedule follow up appointment that was cancelled due to provider schedule.     Called back and left message.     Electronically signed by Deirdre Anderson on 5/27/2025 at 9:16 AM

## 2025-06-09 ENCOUNTER — HOSPITAL ENCOUNTER (OUTPATIENT)
Dept: AUDIOLOGY | Age: 65
Discharge: HOME | End: 2025-06-09

## 2025-06-09 PROCEDURE — 9990000010 HC NO CHARGE VISIT: Performed by: AUDIOLOGIST

## 2025-06-09 NOTE — PROGRESS NOTES
Patient here for HA follow up.     Complains that right aid is more noticeable in ear than left. There is a michael in the right pretty bowl where aid does seem a big large. The mold was buffed with dremel and patient states it feels better.     Feels like pots and pans are very loud. Turned down loud gain for both aids.     Also feels left aid is very soft and a bit plugged. This is her worse ear and has very poor discrimination. Increased gain overall and increased gain for medium sounds. Feedback present but minimal.     She will try changes and call if needs anything further. If right ear is still bothersome, may consider sending for remake. Will need new impressions if does go back for repair.     Otherwise she is hearing much better.     No charge/ previously billed.     Nydia Handley M.A., CCC/A  [place] Lic [id number]  Electronically signed by Deirdre Anderson on 6/9/2025 at 11:28 AM

## 2025-07-07 ENCOUNTER — TELEPHONE (OUTPATIENT)
Dept: CARDIOLOGY CLINIC | Age: 65
End: 2025-07-07

## 2025-07-07 NOTE — TELEPHONE ENCOUNTER
Patient is a previous patient of . Patient is calling asking if she can stop the ASA or plavix. Patient had a stent placed 7/32024. Has an appointment with  on 9/15/2025    Please advise

## 2025-09-05 ENCOUNTER — HOSPITAL ENCOUNTER (OUTPATIENT)
Dept: AUDIOLOGY | Age: 65
Discharge: HOME | End: 2025-09-05

## 2025-09-05 PROCEDURE — 9990000010 HC NO CHARGE VISIT: Performed by: AUDIOLOGIST

## (undated) DEVICE — BLOWER COR ART L16.5CM PLAS SHFT MAL W/ MIST IV SET AXIUS

## (undated) DEVICE — CATHETER GUID 5FR DIA0.058IN SHFT NYL STD JL 3.5 CRV ENH

## (undated) DEVICE — KIT MFLD ISOLATN NACL CNTRST PRT TBNG SPIK W/ PRSS TRNSDUC

## (undated) DEVICE — GLOVE SURG SZ 6 THK91MIL LTX FREE SYN POLYISOPRENE ANTI

## (undated) DEVICE — CATHETER GUID 5FR L100CM DIA0.058IN NYL SHFT EBU3.0 W/O

## (undated) DEVICE — CATHETER THOR 32FR L23IN PVC 6 EYELET STR ATRAUM

## (undated) DEVICE — PACK SURG CARDIAC CATH

## (undated) DEVICE — DRAIN SURG L3/8-1/2IN DIA3/16IN SIL CARD CONN 1:1 BLAK

## (undated) DEVICE — SOLUTION IV 1000ML 140MEQ/L SOD 5MEQ/L K 3MEQ/L MG 27MEQ/L

## (undated) DEVICE — PAD, DEFIB, ADULT, RADIOTRAN, PHYSIO, LO: Brand: MEDLINE

## (undated) DEVICE — SYSTEM ENDOSCP VES HARV W/ TOOL CANN SEAL SHT PRT BLNT TIP

## (undated) DEVICE — ANGIOGRAPHIC CATHETER: Brand: EXPO™

## (undated) DEVICE — KIT ANGIO W/ AT P65 PREM HND CTRL FOR CNTRST DEL ANGIOTOUCH

## (undated) DEVICE — SET TRNQT 12FR TB LEN 6 IN 4 TBS 1 SNR TOURNIKWIK

## (undated) DEVICE — RADIFOCUS OPTITORQUE ANGIOGRAPHIC CATHETER: Brand: OPTITORQUE

## (undated) DEVICE — GUIDEWIRE VASC L260CM 0.035IN J TIP L3MM PTFE FIX COR NAMIC

## (undated) DEVICE — STERILE LATEX POWDER FREE SURGICAL GLOVES WITH HYDROGEL COATING: Brand: PROTEXIS

## (undated) DEVICE — PACK BTTRY FOR B08 SCRDRVR DRL

## (undated) DEVICE — AORTIC PUNCHES ARE USED TO CREATE A UNIFORM OPENING IN BLOOD VESSELS DURING CARDIOVASCULAR SURGERY. THE VESSEL GRAFT IS INSERTED INTO THE CREATED OPENING AND SUTURED TO THE VESSEL WALL. AORTIC LANCETS ARE USED TO MAKE A SMALL UNIFORM CUT IN A BLOOD VESSEL TO FACILITATE INSERTION OF AN AORTIC PUNCH.  PUNCHES COME IN VARIOUS LENGTHS, DIAMETERS AND TIP CONFIGURATIONS.: Brand: CLEANCUT ROTATING AORTIC PUNCH

## (undated) DEVICE — DRAIN SURG SGL COLL PT TB FOR ATS BG OASIS

## (undated) DEVICE — Device: Brand: OMNIWIRE PRESSURE GUIDE WIRE

## (undated) DEVICE — BASIC SINGLE BASIN 1-LF: Brand: MEDLINE INDUSTRIES, INC.

## (undated) DEVICE — PICO 7 10CM X 30CM: Brand: PICO™ 7

## (undated) DEVICE — ALCOHOL RUBBING ISO 16OZ 70%

## (undated) DEVICE — MEDTRONIC 5FR EBU3.5 GUIDING CATHETER

## (undated) DEVICE — GLIDESHEATH SLENDER ACCESS KIT: Brand: GLIDESHEATH SLENDER

## (undated) DEVICE — CANNULA INJ L2.5IN BLNT TIP 3MM CLR BODY W/ 1 W VLV DLP

## (undated) DEVICE — CONNECTOR DRNGE W3/8-0.5XH3/16XL3/16IN 2:1 SIL CARD STR

## (undated) DEVICE — PACK HEART OPEN

## (undated) DEVICE — RETRACTOR SURG INSRT SUT HLD OCTOBASE

## (undated) DEVICE — DRAIN CHN 19FR L0.25MM DIA6.3MM SIL RND HUBLESS FULL FLUT

## (undated) DEVICE — TOWEL,OR,DSP,ST,WHITE,DLX,4/PK,20PK/CS: Brand: MEDLINE

## (undated) DEVICE — CATHETER GUID 6FR L100CM DIA0.071IN NYL SHFT EBU3.5

## (undated) DEVICE — ELECTRODE PT RET AD L9FT HI MOIST COND ADH HYDRGEL CORDED

## (undated) DEVICE — 1LYRTR 16FR10ML100%SILTMPS SNP: Brand: MEDLINE INDUSTRIES, INC.

## (undated) DEVICE — PACK OPEN HRT DRP

## (undated) DEVICE — SOLUTION IV 50ML 0.9% SOD CHL PLAS CONT USP VIAFLX

## (undated) DEVICE — BAND COMPR L24CM REG CLR PLAS HEMSTAT EXT HK AND LOOP RETEN

## (undated) DEVICE — DRAPE THER FLUID WARMING 66X44 IN FLAT SLUSH DBL DISC ORS

## (undated) DEVICE — GLOVE ORANGE PI 7   MSG9070

## (undated) DEVICE — LIQUIBAND RAPID ADHESIVE 36/CS 0.8ML: Brand: MEDLINE

## (undated) DEVICE — 3M™ MEDIPORE™ + PAD 3564: Brand: 3M™ MEDIPORE™

## (undated) DEVICE — TOWEL,OR,DSP,ST,BLUE,STD,6/PK,12PK/CS: Brand: MEDLINE

## (undated) DEVICE — GLOVE SURG SZ 65 THK91MIL LTX FREE SYN POLYISOPRENE

## (undated) DEVICE — GOWN,SIRUS,FABRNF,XL,20/CS: Brand: MEDLINE

## (undated) DEVICE — CANNULA NSL CANN NSL L25FT TBNG AD O2 SUP SFT UC

## (undated) DEVICE — GOWN,SIRUS,FABRNF,L,20/CS: Brand: MEDLINE

## (undated) DEVICE — TUBING, SUCTION, 3/16" X 12', STRAIGHT: Brand: MEDLINE

## (undated) DEVICE — 6 FOOT DISPOSABLE EXTENSION CABLE WITH SAFE CONNECT / SCREW-DOWN

## (undated) DEVICE — DOUBLE BASIN SET: Brand: MEDLINE INDUSTRIES, INC.

## (undated) DEVICE — MARKER,SKIN,WI/RULER AND LABELS: Brand: MEDLINE

## (undated) DEVICE — AVID DUAL STAGE VENOUS DRAINAGE CANNULA: Brand: AVID DUAL STAGE VENOUS DRAINAGE CANNULA

## (undated) DEVICE — AGENT HEMSTAT W4XL4IN OXIDIZED REGENERATED CELOS STRUCTURED